# Patient Record
Sex: FEMALE | Race: ASIAN | NOT HISPANIC OR LATINO | Employment: OTHER | URBAN - METROPOLITAN AREA
[De-identification: names, ages, dates, MRNs, and addresses within clinical notes are randomized per-mention and may not be internally consistent; named-entity substitution may affect disease eponyms.]

---

## 2017-07-26 ENCOUNTER — RECORDS - HEALTHEAST (OUTPATIENT)
Dept: LAB | Facility: CLINIC | Age: 77
End: 2017-07-26

## 2017-07-26 LAB
CHOLEST SERPL-MCNC: 219 MG/DL
FASTING STATUS PATIENT QL REPORTED: ABNORMAL
HDLC SERPL-MCNC: 63 MG/DL
LDLC SERPL CALC-MCNC: 139 MG/DL
TRIGL SERPL-MCNC: 84 MG/DL

## 2018-08-24 ENCOUNTER — RECORDS - HEALTHEAST (OUTPATIENT)
Dept: LAB | Facility: CLINIC | Age: 78
End: 2018-08-24

## 2018-08-24 LAB
ALBUMIN SERPL-MCNC: 3.8 G/DL (ref 3.5–5)
ALP SERPL-CCNC: 105 U/L (ref 45–120)
ALT SERPL W P-5'-P-CCNC: 40 U/L (ref 0–45)
ANION GAP SERPL CALCULATED.3IONS-SCNC: 10 MMOL/L (ref 5–18)
AST SERPL W P-5'-P-CCNC: 30 U/L (ref 0–40)
BILIRUB SERPL-MCNC: 1 MG/DL (ref 0–1)
BUN SERPL-MCNC: 18 MG/DL (ref 8–28)
CALCIUM SERPL-MCNC: 8.8 MG/DL (ref 8.5–10.5)
CHLORIDE BLD-SCNC: 107 MMOL/L (ref 98–107)
CHOLEST SERPL-MCNC: 157 MG/DL
CO2 SERPL-SCNC: 26 MMOL/L (ref 22–31)
CREAT SERPL-MCNC: 0.79 MG/DL (ref 0.6–1.1)
ERYTHROCYTE [DISTWIDTH] IN BLOOD BY AUTOMATED COUNT: 12.9 % (ref 11–14.5)
FASTING STATUS PATIENT QL REPORTED: NORMAL
GFR SERPL CREATININE-BSD FRML MDRD: >60 ML/MIN/1.73M2
GLUCOSE BLD-MCNC: 100 MG/DL (ref 70–125)
HCT VFR BLD AUTO: 41.5 % (ref 35–47)
HDLC SERPL-MCNC: 50 MG/DL
HGB BLD-MCNC: 13.6 G/DL (ref 12–16)
LDLC SERPL CALC-MCNC: 91 MG/DL
MCH RBC QN AUTO: 31.3 PG (ref 27–34)
MCHC RBC AUTO-ENTMCNC: 32.8 G/DL (ref 32–36)
MCV RBC AUTO: 96 FL (ref 80–100)
PLATELET # BLD AUTO: 224 THOU/UL (ref 140–440)
PMV BLD AUTO: 9.9 FL (ref 8.5–12.5)
POTASSIUM BLD-SCNC: 4.3 MMOL/L (ref 3.5–5)
PROT SERPL-MCNC: 7.3 G/DL (ref 6–8)
RBC # BLD AUTO: 4.34 MILL/UL (ref 3.8–5.4)
SODIUM SERPL-SCNC: 143 MMOL/L (ref 136–145)
TRIGL SERPL-MCNC: 82 MG/DL
WBC: 6.1 THOU/UL (ref 4–11)

## 2018-08-27 LAB — 25(OH)D3 SERPL-MCNC: 28.1 NG/ML (ref 30–80)

## 2019-01-25 ENCOUNTER — RECORDS - HEALTHEAST (OUTPATIENT)
Dept: LAB | Facility: CLINIC | Age: 79
End: 2019-01-25

## 2019-01-28 LAB — 25(OH)D3 SERPL-MCNC: 19.5 NG/ML (ref 30–80)

## 2019-11-15 ENCOUNTER — RECORDS - HEALTHEAST (OUTPATIENT)
Dept: LAB | Facility: CLINIC | Age: 79
End: 2019-11-15

## 2019-11-15 LAB
ANION GAP SERPL CALCULATED.3IONS-SCNC: 7 MMOL/L (ref 5–18)
BUN SERPL-MCNC: 17 MG/DL (ref 8–28)
CALCIUM SERPL-MCNC: 9 MG/DL (ref 8.5–10.5)
CHLORIDE BLD-SCNC: 104 MMOL/L (ref 98–107)
CHOLEST SERPL-MCNC: 195 MG/DL
CO2 SERPL-SCNC: 30 MMOL/L (ref 22–31)
CREAT SERPL-MCNC: 0.92 MG/DL (ref 0.6–1.1)
FASTING STATUS PATIENT QL REPORTED: NORMAL
GFR SERPL CREATININE-BSD FRML MDRD: 59 ML/MIN/1.73M2
GLUCOSE BLD-MCNC: 112 MG/DL (ref 70–125)
HDLC SERPL-MCNC: 56 MG/DL
LDLC SERPL CALC-MCNC: 124 MG/DL
POTASSIUM BLD-SCNC: 4.1 MMOL/L (ref 3.5–5)
SODIUM SERPL-SCNC: 141 MMOL/L (ref 136–145)
TRIGL SERPL-MCNC: 77 MG/DL

## 2021-02-22 ENCOUNTER — RECORDS - HEALTHEAST (OUTPATIENT)
Dept: LAB | Facility: CLINIC | Age: 81
End: 2021-02-22

## 2021-02-23 LAB
ALBUMIN SERPL-MCNC: 3.4 G/DL (ref 3.5–5)
ALP SERPL-CCNC: 98 U/L (ref 45–120)
ALT SERPL W P-5'-P-CCNC: 29 U/L (ref 0–45)
ANION GAP SERPL CALCULATED.3IONS-SCNC: 7 MMOL/L (ref 5–18)
AST SERPL W P-5'-P-CCNC: 23 U/L (ref 0–40)
BILIRUB SERPL-MCNC: 1.1 MG/DL (ref 0–1)
BUN SERPL-MCNC: 25 MG/DL (ref 8–28)
CALCIUM SERPL-MCNC: 8.2 MG/DL (ref 8.5–10.5)
CHLORIDE BLD-SCNC: 106 MMOL/L (ref 98–107)
CHOLEST SERPL-MCNC: 159 MG/DL
CO2 SERPL-SCNC: 31 MMOL/L (ref 22–31)
CREAT SERPL-MCNC: 0.82 MG/DL (ref 0.6–1.1)
FASTING STATUS PATIENT QL REPORTED: NORMAL
GFR SERPL CREATININE-BSD FRML MDRD: >60 ML/MIN/1.73M2
GLUCOSE BLD-MCNC: 103 MG/DL (ref 70–125)
HDLC SERPL-MCNC: 62 MG/DL
LDLC SERPL CALC-MCNC: 86 MG/DL
POTASSIUM BLD-SCNC: 3.5 MMOL/L (ref 3.5–5)
PROT SERPL-MCNC: 6.5 G/DL (ref 6–8)
SODIUM SERPL-SCNC: 144 MMOL/L (ref 136–145)
TRIGL SERPL-MCNC: 53 MG/DL

## 2021-05-26 ENCOUNTER — RECORDS - HEALTHEAST (OUTPATIENT)
Dept: ADMINISTRATIVE | Facility: CLINIC | Age: 81
End: 2021-05-26

## 2021-05-28 ENCOUNTER — RECORDS - HEALTHEAST (OUTPATIENT)
Dept: ADMINISTRATIVE | Facility: CLINIC | Age: 81
End: 2021-05-28

## 2021-05-29 ENCOUNTER — RECORDS - HEALTHEAST (OUTPATIENT)
Dept: ADMINISTRATIVE | Facility: CLINIC | Age: 81
End: 2021-05-29

## 2021-06-02 ENCOUNTER — RECORDS - HEALTHEAST (OUTPATIENT)
Dept: ADMINISTRATIVE | Facility: CLINIC | Age: 81
End: 2021-06-02

## 2021-06-03 ENCOUNTER — RECORDS - HEALTHEAST (OUTPATIENT)
Dept: ADMINISTRATIVE | Facility: CLINIC | Age: 81
End: 2021-06-03

## 2021-06-24 ENCOUNTER — APPOINTMENT (OUTPATIENT)
Dept: CT IMAGING | Facility: CLINIC | Age: 81
End: 2021-06-24
Attending: EMERGENCY MEDICINE
Payer: COMMERCIAL

## 2021-06-24 ENCOUNTER — APPOINTMENT (OUTPATIENT)
Dept: GENERAL RADIOLOGY | Facility: CLINIC | Age: 81
End: 2021-06-24
Attending: EMERGENCY MEDICINE
Payer: COMMERCIAL

## 2021-06-24 ENCOUNTER — HOSPITAL ENCOUNTER (EMERGENCY)
Facility: CLINIC | Age: 81
Discharge: HOME | End: 2021-06-24
Attending: EMERGENCY MEDICINE | Admitting: EMERGENCY MEDICINE
Payer: COMMERCIAL

## 2021-06-24 VITALS
BODY MASS INDEX: 26.5 KG/M2 | WEIGHT: 135 LBS | DIASTOLIC BLOOD PRESSURE: 152 MMHG | OXYGEN SATURATION: 77 % | HEART RATE: 73 BPM | HEIGHT: 60 IN | TEMPERATURE: 97.1 F | SYSTOLIC BLOOD PRESSURE: 171 MMHG

## 2021-06-24 DIAGNOSIS — G45.9 TIA (TRANSIENT ISCHEMIC ATTACK): ICD-10-CM

## 2021-06-24 LAB
ALBUMIN UR-MCNC: 30 MG/DL
ANION GAP SERPL CALCULATED.3IONS-SCNC: 1 MMOL/L (ref 3–14)
APPEARANCE UR: CLEAR
APTT PPP: 29 SEC (ref 22–37)
BASOPHILS # BLD AUTO: 0 10E9/L (ref 0–0.2)
BASOPHILS NFR BLD AUTO: 0.4 %
BILIRUB UR QL STRIP: NEGATIVE
BUN SERPL-MCNC: 34 MG/DL (ref 7–30)
CALCIUM SERPL-MCNC: 8.8 MG/DL (ref 8.5–10.1)
CHLORIDE SERPL-SCNC: 107 MMOL/L (ref 94–109)
CO2 SERPL-SCNC: 31 MMOL/L (ref 20–32)
COLOR UR AUTO: ABNORMAL
CREAT SERPL-MCNC: 0.95 MG/DL (ref 0.52–1.04)
DIFFERENTIAL METHOD BLD: NORMAL
EOSINOPHIL # BLD AUTO: 0.2 10E9/L (ref 0–0.7)
EOSINOPHIL NFR BLD AUTO: 2 %
ERYTHROCYTE [DISTWIDTH] IN BLOOD BY AUTOMATED COUNT: 13.2 % (ref 10–15)
GFR SERPL CREATININE-BSD FRML MDRD: 56 ML/MIN/{1.73_M2}
GLUCOSE SERPL-MCNC: 149 MG/DL (ref 70–99)
GLUCOSE UR STRIP-MCNC: NEGATIVE MG/DL
HCT VFR BLD AUTO: 42.4 % (ref 35–47)
HGB BLD-MCNC: 13.4 G/DL (ref 11.7–15.7)
HGB UR QL STRIP: NEGATIVE
IMM GRANULOCYTES # BLD: 0.1 10E9/L (ref 0–0.4)
IMM GRANULOCYTES NFR BLD: 0.9 %
INR PPP: 0.9 (ref 0.86–1.14)
KETONES UR STRIP-MCNC: NEGATIVE MG/DL
LEUKOCYTE ESTERASE UR QL STRIP: NEGATIVE
LYMPHOCYTES # BLD AUTO: 1 10E9/L (ref 0.8–5.3)
LYMPHOCYTES NFR BLD AUTO: 13.5 %
MCH RBC QN AUTO: 30.4 PG (ref 26.5–33)
MCHC RBC AUTO-ENTMCNC: 31.6 G/DL (ref 31.5–36.5)
MCV RBC AUTO: 96 FL (ref 78–100)
MONOCYTES # BLD AUTO: 0.4 10E9/L (ref 0–1.3)
MONOCYTES NFR BLD AUTO: 5.6 %
NEUTROPHILS # BLD AUTO: 5.8 10E9/L (ref 1.6–8.3)
NEUTROPHILS NFR BLD AUTO: 77.6 %
NITRATE UR QL: NEGATIVE
NRBC # BLD AUTO: 0 10*3/UL
NRBC BLD AUTO-RTO: 0 /100
PH UR STRIP: 7.5 PH (ref 5–7)
PLATELET # BLD AUTO: 198 10E9/L (ref 150–450)
POTASSIUM SERPL-SCNC: 5 MMOL/L (ref 3.4–5.3)
RBC # BLD AUTO: 4.41 10E12/L (ref 3.8–5.2)
RBC #/AREA URNS AUTO: 0 /HPF (ref 0–2)
SODIUM SERPL-SCNC: 139 MMOL/L (ref 133–144)
SOURCE: ABNORMAL
SP GR UR STRIP: 1.03 (ref 1–1.03)
SQUAMOUS #/AREA URNS AUTO: <1 /HPF (ref 0–1)
TROPONIN I SERPL-MCNC: 0.01 UG/L (ref 0–0.04)
UROBILINOGEN UR STRIP-MCNC: 0 MG/DL (ref 0–2)
WBC # BLD AUTO: 7.5 10E9/L (ref 4–11)
WBC #/AREA URNS AUTO: 2 /HPF (ref 0–5)

## 2021-06-24 PROCEDURE — 93005 ELECTROCARDIOGRAM TRACING: CPT

## 2021-06-24 PROCEDURE — 81001 URINALYSIS AUTO W/SCOPE: CPT | Performed by: EMERGENCY MEDICINE

## 2021-06-24 PROCEDURE — 250N000011 HC RX IP 250 OP 636: Performed by: EMERGENCY MEDICINE

## 2021-06-24 PROCEDURE — 70450 CT HEAD/BRAIN W/O DYE: CPT

## 2021-06-24 PROCEDURE — 70496 CT ANGIOGRAPHY HEAD: CPT

## 2021-06-24 PROCEDURE — 0042T CT HEAD PERFUSION WITH CONTRAST: CPT

## 2021-06-24 PROCEDURE — 84484 ASSAY OF TROPONIN QUANT: CPT | Performed by: EMERGENCY MEDICINE

## 2021-06-24 PROCEDURE — 85730 THROMBOPLASTIN TIME PARTIAL: CPT | Performed by: EMERGENCY MEDICINE

## 2021-06-24 PROCEDURE — 80048 BASIC METABOLIC PNL TOTAL CA: CPT | Performed by: EMERGENCY MEDICINE

## 2021-06-24 PROCEDURE — 99285 EMERGENCY DEPT VISIT HI MDM: CPT | Mod: 25

## 2021-06-24 PROCEDURE — 85025 COMPLETE CBC W/AUTO DIFF WBC: CPT | Performed by: EMERGENCY MEDICINE

## 2021-06-24 PROCEDURE — 71045 X-RAY EXAM CHEST 1 VIEW: CPT

## 2021-06-24 PROCEDURE — 250N000009 HC RX 250: Performed by: EMERGENCY MEDICINE

## 2021-06-24 PROCEDURE — 85610 PROTHROMBIN TIME: CPT | Performed by: EMERGENCY MEDICINE

## 2021-06-24 RX ORDER — LISINOPRIL 10 MG/1
10 TABLET ORAL DAILY
Qty: 20 TABLET | Refills: 0 | Status: SHIPPED | OUTPATIENT
Start: 2021-06-24 | End: 2022-06-03

## 2021-06-24 RX ORDER — IOPAMIDOL 755 MG/ML
120 INJECTION, SOLUTION INTRAVASCULAR ONCE
Status: COMPLETED | OUTPATIENT
Start: 2021-06-24 | End: 2021-06-24

## 2021-06-24 RX ADMIN — SODIUM CHLORIDE 100 ML: 9 INJECTION, SOLUTION INTRAVENOUS at 19:29

## 2021-06-24 RX ADMIN — IOPAMIDOL 120 ML: 755 INJECTION, SOLUTION INTRAVENOUS at 19:29

## 2021-06-24 ASSESSMENT — ENCOUNTER SYMPTOMS
CONFUSION: 1
HEADACHES: 1

## 2021-06-24 ASSESSMENT — MIFFLIN-ST. JEOR: SCORE: 998.86

## 2021-06-25 ENCOUNTER — RECORDS - HEALTHEAST (OUTPATIENT)
Dept: LAB | Facility: CLINIC | Age: 81
End: 2021-06-25

## 2021-06-25 LAB
ANION GAP SERPL CALCULATED.3IONS-SCNC: 16 MMOL/L (ref 5–18)
BUN SERPL-MCNC: 8 MG/DL (ref 8–28)
CALCIUM SERPL-MCNC: 9.4 MG/DL (ref 8.5–10.5)
CHLORIDE BLD-SCNC: 102 MMOL/L (ref 98–107)
CHOLEST SERPL-MCNC: 172 MG/DL
CO2 SERPL-SCNC: 21 MMOL/L (ref 22–31)
CREAT SERPL-MCNC: 0.65 MG/DL (ref 0.6–1.1)
FASTING STATUS PATIENT QL REPORTED: ABNORMAL
GFR SERPL CREATININE-BSD FRML MDRD: >60 ML/MIN/1.73M2
GLUCOSE BLD-MCNC: 255 MG/DL (ref 70–125)
HDLC SERPL-MCNC: 44 MG/DL
INTERPRETATION ECG - MUSE: NORMAL
LDLC SERPL CALC-MCNC: 76 MG/DL
POTASSIUM BLD-SCNC: 4 MMOL/L (ref 3.5–5)
SODIUM SERPL-SCNC: 139 MMOL/L (ref 136–145)
TRIGL SERPL-MCNC: 262 MG/DL

## 2021-06-25 NOTE — DISCHARGE INSTRUCTIONS
Your symptoms have spontaneously resolved and your stroke evaluation has been normal.  We have discussed that we have not ruled out a tiny stroke with CT scan but you feel your mom is back to normal and he requested to go home.  Please start aspirin daily.  Please start lisinopril due to high blood pressures in the emergency room.  Please follow-up with your regular doctor for blood pressure recheck.  Please return to the hospital if this recurs or if you change your mind about admission.

## 2021-06-25 NOTE — ED NOTES
Bed: ED20  Expected date:   Expected time:   Means of arrival:   Comments:  Ems - [facility]

## 2021-06-25 NOTE — CONSULTS
[facility]    Stroke Telephone Note    I was called by Abdullahi Triplett on 06/24/21 regarding patient [place]. The patient is a 81 year old Hmong female with documented PMH of HTN, HLD seen as a stroke alert for confusion starting 5:40 PM on 6/24/2021.     The patient is reported to have been having dinner with her family around 5:40 AM when she was suddenly noted to be confused, not talking as much and having some right-sided weakness.  On arrival to the ED,  mmHg.  Head CT as well as CT of the head and neck did not show any acute parenchymal pathology or proximal vessel occlusions.  Moderate to severe microangiopathic disease was noted on head CT along with calcification of the left vertebral artery.  CT perfusion did not show any areas in a vascular distribution of prolonged T-max.    Stroke Code Data (for stroke code without tele)  Stroke code activated 06/24/21   1911   Stroke provider first response  06/24/21   1913    06/24/21       Last known normal 06/24/21   1740        Time of discovery   (or onset of symptoms) 06/24/21   1740   Head CT read by Stroke Neuro Dr/Provider 06/24/21   1928   Was stroke code de-escalated?   06/24/21 2004  presence of contraindications for both intravenous and intra-arterial stroke treatments       Thrombolytic Treatment   Not given due to NIHSS 0 .    Endovascular Treatment  Not initiated due to absence of proximal vessel occlusion    Impression  Transient confusion without obvious focal neurological deficits      The patient was examined on telestroke and noted to have a NIHSS: 0.  She was using her left upper extremity more than the right, but did not have any measurable weakness in her right upper extremity.  No pronator vertical drift was observed in all 4 limbs.  She was able to name and repeat when spoken in her native language.  She was able to name a pen and a phone, when spoken to her in her native language.  The patient is  "reported to have complained of a headache at onset and her symptoms might be related to a complex migraine.  However, more migraine history could not be elicited given the language barrier in the acute setting.  Finally, she was reported to have been severely pallid despite a normal blood pressure on arrival, and her symptoms may be due to a presyncopal episode    Recommendations   - Neurochecks every 4 hours   - Permissive HTN; goal SBP < 220 mmHg  - Daily aspirin 325 mg for secondary stroke prevention  - Statin: atorvastatin 40mg HS  - MRI Brain without contrast  - TTE (without bubble Study)  - Telemetry, EKG  - Bedside Glucose Monitoring  - A1c, Lipid Panel, Troponin x 3  - PT/OT/SLP  - Stroke Education  - Depression Screen  - Apnea screening questions  - Euthermia, Euglycemia      My recommendations are based on the information provided over the phone by Errol Londono's in-person providers. They are not intended to replace the clinical judgment of her in-person providers. I was not requested to personally see or examine the patient at this time.    The Stroke Staff is Dr. Gilliam.    SCAR IRENE MD  Vascular Neurology Fellow  To page me or covering stroke neurology team member, click here: [facility]COM   Choose \"On Call\" tab at top, then search dropdown box for \"Neurology Adult\", select location, press Enter, then look for stroke/neuro ICU/telestroke.         "

## 2021-06-25 NOTE — ED PROVIDER NOTES
History     Chief Complaint:  Confusion     The history is provided by a relative. The history is limited by a language barrier.  used: relative.      Errol Londono is a 81 year old female with history of hypertension, and venous insufficiency who presents for evaluation of confusion. She was seated with family when the patient stated she had a headache and then had a sudden onset of confusion. This occurred around [id number]. The patient did not get better so the family called for EMS who transported the patient to the ER.  On arrival patient is not responding to family's questions.  Eyes are wandering.  Seem to occasionally move extremities to command speech is quiet and not understandable according to family the patient does not speak English.  There is complaint of moderate headache.  No chest pain or shortness of breath.    Review of Systems   Neurological: Positive for headaches.   Psychiatric/Behavioral: Positive for confusion.   All other systems reviewed and are negative.      Allergies:  The patient has no known allergies.     Medications:  Fosamax  Pravachol  Amlodipine  Klorcon  Lasix  Ropinirole    Past Medical History:    Osteoporosis  Prediabetes  Hypertension   Restless leg syndrome  Hypercholesterolemia  Plantar Fascial Fibromatosis  Venous Insufficiency  Arthritis   GERD  Depression      Past Surgical History:    Biliary Tube Change  Transcatheter Biopsy    Social History:  The patient was accompanied to the ED by EMS and family.  Speaks Hmong.     Physical Exam     Patient Vitals for the past 24 hrs:   BP Temp Pulse SpO2 Height Weight   06/24/21 2045 (!) 171/152 -- 73 -- -- --   06/24/21 2030 (!) 199/111 -- 78 -- -- --   06/24/21 2015 (!) 190/121 -- 78 (!) 77 % -- --   06/24/21 2000 (!) 183/109 -- 80 96 % -- --   06/24/21 1945 (!) 153/101 -- -- -- -- --   06/24/21 1915 -- -- -- 98 % -- --   06/24/21 1911 -- -- -- -- 1.524 m (5') 61.2 kg (135 lb)   06/24/21 1910 (!) 166/101 97.1  F  (36.2  C) 68 97 % -- --       Physical Exam  Constitutional:       Appearance: She is diaphoretic.   HENT:      Head: Normocephalic.      Mouth/Throat:      Mouth: Mucous membranes are moist.   Eyes:      General: No scleral icterus.  Neck:      Musculoskeletal: Normal range of motion.   Cardiovascular:      Rate and Rhythm: Normal rate and regular rhythm.   Pulmonary:      Effort: Pulmonary effort is normal.      Breath sounds: Normal breath sounds.   Musculoskeletal: Normal range of motion.   Skin:     General: Skin is warm.   Neurological:      Mental Status: She is lethargic and confused.      GCS: GCS eye subscore is 4. GCS verbal subscore is 4. GCS motor subscore is 5.   Psychiatric:      Comments: Difficult to assess due to language barrier         Emergency Department Course     ECG:  ECG taken at 2006, ECG read at 2007  Sinus Rhythm with 1st Degree AV Block  Otherwise Normal ECG  Rate 75 bpm. TX interval 214 ms. QRS duration 82 ms. QT/QTc 392/437 ms. P-R-T axes 51 -1 19.     Imaging:    CT Head Perfusion w Contrast  IMPRESSION: No convincing acute perfusion defect. Few scattered areas   of nonspecific transit time prolongation most conspicuous   corresponding to the bilateral corona radiata and right inferior   cerebellum. MRI could be performed if indicated.   Report per radiology     CTA Head Neck with Contrast  IMPRESSION:   CTA Head:   1. No evidence of large vessel occlusion or high-grade stenosis.   2. Scattered atherosclerotic plaquing with multiple mild-to-moderate   stenoses.   3. Poor visualization of the right posterior inferior cerebellar   artery.   CTA Neck:   1. No evidence of large vessel occlusion or high-grade stenosis. Mild   plaquing at the bilateral carotid bifurcations and right vertebral   artery origin.   Report per radiology     CT Head w/o Contrast  IMPRESSION:   1. Small areas of hypoattenuation involving the bilateral basal   ganglia and thalami suspicious for lacunar infarcts,  age   indeterminate, presumably chronic. MRI could be performed if   indicated.   2. Otherwise, no CT evidence of acute ischemia.   3. No evidence of hemorrhage.   4. Patchy confluent white matter hypoattenuation which represents   advanced chronic small vessel ischemic change.   Report per radiology     XR Chest Port  IMPRESSION: No significant pleural fluid. No pneumothorax.   Heterogeneous pulmonary opacities are concerning for multifocal   airspace disease. Edema could also have this appearance. The   cardiomediastinal silhouette is enlarged. There is aortic   calcification.   Report per radiology     Laboratory:    CBC: WBC 7.5, HGB 13.4,   BMP: Anion Gap: 1 (L), Glucose: 149 (H), BUN: 34 (H), GFR: 56 (L), o/w WNL (Creatinine 0.95)    UA with microscopic: pH: 7.5 (H), Albumin: 30 o/w WNL     INR: 0.90    Partial Thromboplastin Time: 29    Troponin (Collected 1910): <0.015    Emergency Department Course:    Reviewed:    1905 I reviewed the patient's nursing notes, vitals, past history and care everywhere    Assessments:    1905 I performed an exam of the patient as documented above.     1908 Tier 1 Stroke Code Called.     1955 Stroke Code Deescalated    1957 I rechecked the patient and explained findings.    2055 I rechecked the patient and family felt the patient symptoms have resolved. Family wants to bring patient home.     Consults:     1957  I consulted with Neuro/Stroke fellow, about the patient.     Disposition:  The patient was discharged to home.     Impression & Plan          Medical Decision Making:  Errol Londono is a 81 year old female who presents to the emergency department today for evaluation of altered mental status.  Patient apparently slumped down in a chair and was unresponsive at home.  Differential diagnosis includes seizure syncope and/or stroke on arrival here patient seems to be not responding to family does not really follow commands due to acute confusion sudden onset and normal  blood sugar and high blood pressure rather than low and history of headache stroke code was called urgent CT for evaluation of hemorrhage was identified performed and negative.  While in the emergency room patient suddenly came back to normal.  She was able to talk to the family without difficulty and family was requesting to go home.  Due to her elevated pressures felt uncomfortable this and considered admission however family adamantly denies this and prefers to go home.  We will initiate lisinopril and daily aspirin due to inability to rule out TIA.  Recommend follow-up with primary care for blood pressure check and return for worsening condition or when they decide they are amenable to admission.  Patient was discharged in guarded condition due to age at 81 high blood pressure poorly controlled an episode of near syncope without clear cause and concern for TIA versus ischemic stroke that has now improved or resolved according to the family.      Diagnosis:    ICD-10-CM    1. TIA (transient ischemic attack)  G45.9 Troponin I       Discharge Medications:  Discharge Medication List as of 6/24/2021  9:28 PM      START taking these medications    Details   aspirin (ASA) 325 MG EC tablet Take 1 tablet (325 mg) by mouth every 6 hours as needed for moderate pain, Disp-30 tablet, R-0, Local Print      lisinopril (ZESTRIL) 10 MG tablet Take 1 tablet (10 mg) by mouth daily, Disp-20 tablet, R-0, Local Print             Scribe Disclosure:  I, Soila Menard, am serving as a scribe at 7:07 PM on 6/24/2021 to document services personally performed by Abdullahi Triplett MD based on my observations and the provider's statements to me.     [facility] EMERGENCY DEPT         Abdullahi Triplett MD  06/25/21 0011

## 2022-01-01 ENCOUNTER — MEDICAL CORRESPONDENCE (OUTPATIENT)
Dept: HEALTH INFORMATION MANAGEMENT | Facility: CLINIC | Age: 82
End: 2022-01-01

## 2022-01-01 ENCOUNTER — LAB REQUISITION (OUTPATIENT)
Dept: LAB | Facility: CLINIC | Age: 82
End: 2022-01-01
Payer: MEDICARE

## 2022-01-01 ENCOUNTER — LAB REQUISITION (OUTPATIENT)
Dept: LAB | Facility: CLINIC | Age: 82
End: 2022-01-01
Payer: COMMERCIAL

## 2022-01-01 ENCOUNTER — NURSING HOME VISIT (OUTPATIENT)
Dept: GERIATRICS | Facility: CLINIC | Age: 82
End: 2022-01-01
Payer: COMMERCIAL

## 2022-01-01 ENCOUNTER — DOCUMENTATION ONLY (OUTPATIENT)
Dept: OTHER | Facility: CLINIC | Age: 82
End: 2022-01-01

## 2022-01-01 ENCOUNTER — NURSING HOME VISIT (OUTPATIENT)
Dept: GERIATRICS | Facility: CLINIC | Age: 82
End: 2022-01-01
Payer: MEDICARE

## 2022-01-01 ENCOUNTER — PATIENT OUTREACH (OUTPATIENT)
Dept: CARE COORDINATION | Facility: CLINIC | Age: 82
End: 2022-01-01

## 2022-01-01 ENCOUNTER — LAB REQUISITION (OUTPATIENT)
Dept: LAB | Facility: CLINIC | Age: 82
End: 2022-01-01
Payer: OTHER MISCELLANEOUS

## 2022-01-01 ENCOUNTER — NURSING HOME VISIT (OUTPATIENT)
Dept: GERIATRICS | Facility: CLINIC | Age: 82
End: 2022-01-01
Payer: OTHER MISCELLANEOUS

## 2022-01-01 ENCOUNTER — TELEPHONE (OUTPATIENT)
Dept: GERIATRICS | Facility: CLINIC | Age: 82
End: 2022-01-01

## 2022-01-01 VITALS
BODY MASS INDEX: 19.43 KG/M2 | OXYGEN SATURATION: 90 % | SYSTOLIC BLOOD PRESSURE: 125 MMHG | HEART RATE: 83 BPM | WEIGHT: 99.5 LBS | TEMPERATURE: 97.8 F | DIASTOLIC BLOOD PRESSURE: 73 MMHG | RESPIRATION RATE: 18 BRPM

## 2022-01-01 VITALS
RESPIRATION RATE: 19 BRPM | DIASTOLIC BLOOD PRESSURE: 79 MMHG | OXYGEN SATURATION: 96 % | HEART RATE: 77 BPM | SYSTOLIC BLOOD PRESSURE: 122 MMHG | HEIGHT: 60 IN | BODY MASS INDEX: 18.1 KG/M2 | TEMPERATURE: 97.7 F | WEIGHT: 92.2 LBS

## 2022-01-01 VITALS
SYSTOLIC BLOOD PRESSURE: 154 MMHG | TEMPERATURE: 98.6 F | BODY MASS INDEX: 18.06 KG/M2 | WEIGHT: 92 LBS | HEIGHT: 60 IN | RESPIRATION RATE: 18 BRPM | OXYGEN SATURATION: 93 % | HEART RATE: 77 BPM | DIASTOLIC BLOOD PRESSURE: 72 MMHG

## 2022-01-01 VITALS
OXYGEN SATURATION: 92 % | RESPIRATION RATE: 18 BRPM | DIASTOLIC BLOOD PRESSURE: 83 MMHG | TEMPERATURE: 96.9 F | HEART RATE: 84 BPM | HEIGHT: 60 IN | BODY MASS INDEX: 17.98 KG/M2 | SYSTOLIC BLOOD PRESSURE: 126 MMHG | WEIGHT: 91.6 LBS

## 2022-01-01 VITALS
SYSTOLIC BLOOD PRESSURE: 131 MMHG | DIASTOLIC BLOOD PRESSURE: 78 MMHG | BODY MASS INDEX: 18.73 KG/M2 | HEIGHT: 60 IN | TEMPERATURE: 97.4 F | WEIGHT: 95.4 LBS | HEART RATE: 82 BPM | RESPIRATION RATE: 18 BRPM | OXYGEN SATURATION: 93 %

## 2022-01-01 VITALS
BODY MASS INDEX: 18.73 KG/M2 | RESPIRATION RATE: 16 BRPM | SYSTOLIC BLOOD PRESSURE: 128 MMHG | TEMPERATURE: 98.1 F | HEART RATE: 84 BPM | WEIGHT: 95.4 LBS | DIASTOLIC BLOOD PRESSURE: 80 MMHG | HEIGHT: 60 IN | OXYGEN SATURATION: 93 %

## 2022-01-01 VITALS
HEIGHT: 60 IN | BODY MASS INDEX: 18.89 KG/M2 | TEMPERATURE: 97.9 F | OXYGEN SATURATION: 93 % | WEIGHT: 96.2 LBS | DIASTOLIC BLOOD PRESSURE: 64 MMHG | RESPIRATION RATE: 16 BRPM | HEART RATE: 88 BPM | SYSTOLIC BLOOD PRESSURE: 121 MMHG

## 2022-01-01 DIAGNOSIS — Z11.1 ENCOUNTER FOR SCREENING FOR RESPIRATORY TUBERCULOSIS: ICD-10-CM

## 2022-01-01 DIAGNOSIS — U07.1 COVID-19: ICD-10-CM

## 2022-01-01 DIAGNOSIS — I27.20 MODERATE PULMONARY HYPERTENSION (H): ICD-10-CM

## 2022-01-01 DIAGNOSIS — I27.20 MODERATE PULMONARY HYPERTENSION (H): Primary | ICD-10-CM

## 2022-01-01 DIAGNOSIS — I50.9 CHRONIC CONGESTIVE HEART FAILURE, UNSPECIFIED HEART FAILURE TYPE (H): ICD-10-CM

## 2022-01-01 DIAGNOSIS — G30.9 MIXED DEMENTIA (H): ICD-10-CM

## 2022-01-01 DIAGNOSIS — U07.1 INFECTION DUE TO 2019 NOVEL CORONAVIRUS: ICD-10-CM

## 2022-01-01 DIAGNOSIS — F33.9 RECURRENT MAJOR DEPRESSIVE DISORDER, REMISSION STATUS UNSPECIFIED (H): ICD-10-CM

## 2022-01-01 DIAGNOSIS — F03.90 DEMENTIA WITHOUT BEHAVIORAL DISTURBANCE, UNSPECIFIED DEMENTIA TYPE: ICD-10-CM

## 2022-01-01 DIAGNOSIS — I50.9 NEW ONSET OF CONGESTIVE HEART FAILURE (H): Primary | ICD-10-CM

## 2022-01-01 DIAGNOSIS — Z51.5 HOSPICE CARE PATIENT: ICD-10-CM

## 2022-01-01 DIAGNOSIS — Z51.5 HOSPICE CARE PATIENT: Primary | ICD-10-CM

## 2022-01-01 DIAGNOSIS — J96.01 ACUTE RESPIRATORY FAILURE WITH HYPOXIA (H): ICD-10-CM

## 2022-01-01 DIAGNOSIS — F01.50 MIXED DEMENTIA (H): ICD-10-CM

## 2022-01-01 DIAGNOSIS — R62.7 FAILURE TO THRIVE IN ADULT: Primary | ICD-10-CM

## 2022-01-01 DIAGNOSIS — M62.81 GENERALIZED MUSCLE WEAKNESS: ICD-10-CM

## 2022-01-01 DIAGNOSIS — D64.9 ANEMIA, UNSPECIFIED TYPE: ICD-10-CM

## 2022-01-01 DIAGNOSIS — F02.80 MIXED DEMENTIA (H): ICD-10-CM

## 2022-01-01 DIAGNOSIS — J18.9 COMMUNITY ACQUIRED PNEUMONIA, UNSPECIFIED LATERALITY: ICD-10-CM

## 2022-01-01 DIAGNOSIS — R62.7 FAILURE TO THRIVE IN ADULT: ICD-10-CM

## 2022-01-01 DIAGNOSIS — G93.41 METABOLIC ENCEPHALOPATHY: ICD-10-CM

## 2022-01-01 DIAGNOSIS — N17.9 ACUTE KIDNEY INJURY (H): ICD-10-CM

## 2022-01-01 DIAGNOSIS — Z71.89 OTHER SPECIFIED COUNSELING: ICD-10-CM

## 2022-01-01 DIAGNOSIS — B85.0 LICE INFESTED HAIR: ICD-10-CM

## 2022-01-01 DIAGNOSIS — I67.9 CEREBRAL VASCULAR DISEASE: ICD-10-CM

## 2022-01-01 DIAGNOSIS — I50.9 NEW ONSET OF CONGESTIVE HEART FAILURE (H): ICD-10-CM

## 2022-01-01 DIAGNOSIS — I67.9 CEREBRAL VASCULAR DISEASE: Primary | ICD-10-CM

## 2022-01-01 DIAGNOSIS — R19.7 DIARRHEA, UNSPECIFIED TYPE: ICD-10-CM

## 2022-01-01 LAB
BACTERIA BLD CULT: NO GROWTH
BACTERIA BLD CULT: NO GROWTH
GAMMA INTERFERON BACKGROUND BLD IA-ACNC: 0.07 IU/ML
M TB IFN-G BLD-IMP: NEGATIVE
M TB IFN-G CD4+ BCKGRND COR BLD-ACNC: 2.89 IU/ML
MITOGEN IGNF BCKGRD COR BLD-ACNC: 0.02 IU/ML
MITOGEN IGNF BCKGRD COR BLD-ACNC: 0.03 IU/ML
QUANTIFERON MITOGEN: 2.96 IU/ML
QUANTIFERON NIL TUBE: 0.07 IU/ML
QUANTIFERON TB1 TUBE: 0.1 IU/ML
QUANTIFERON TB2 TUBE: 0.09
SARS-COV-2 RNA RESP QL NAA+PROBE: NEGATIVE
SARS-COV-2 RNA RESP QL NAA+PROBE: POSITIVE

## 2022-01-01 PROCEDURE — 99305 1ST NF CARE MODERATE MDM 35: CPT | Mod: GW | Performed by: INTERNAL MEDICINE

## 2022-01-01 PROCEDURE — 250N000009 HC RX 250: Performed by: STUDENT IN AN ORGANIZED HEALTH CARE EDUCATION/TRAINING PROGRAM

## 2022-01-01 PROCEDURE — 99310 SBSQ NF CARE HIGH MDM 45: CPT | Mod: GV

## 2022-01-01 PROCEDURE — 86481 TB AG RESPONSE T-CELL SUSP: CPT | Mod: ORL

## 2022-01-01 PROCEDURE — 94640 AIRWAY INHALATION TREATMENT: CPT

## 2022-01-01 PROCEDURE — U0003 INFECTIOUS AGENT DETECTION BY NUCLEIC ACID (DNA OR RNA); SEVERE ACUTE RESPIRATORY SYNDROME CORONAVIRUS 2 (SARS-COV-2) (CORONAVIRUS DISEASE [COVID-19]), AMPLIFIED PROBE TECHNIQUE, MAKING USE OF HIGH THROUGHPUT TECHNOLOGIES AS DESCRIBED BY CMS-2020-01-R: HCPCS | Mod: ORL

## 2022-01-01 PROCEDURE — U0005 INFEC AGEN DETEC AMPLI PROBE: HCPCS | Mod: ORL

## 2022-01-01 PROCEDURE — P9604 ONE-WAY ALLOW PRORATED TRIP: HCPCS | Mod: ORL

## 2022-01-01 PROCEDURE — U0003 INFECTIOUS AGENT DETECTION BY NUCLEIC ACID (DNA OR RNA); SEVERE ACUTE RESPIRATORY SYNDROME CORONAVIRUS 2 (SARS-COV-2) (CORONAVIRUS DISEASE [COVID-19]), AMPLIFIED PROBE TECHNIQUE, MAKING USE OF HIGH THROUGHPUT TECHNOLOGIES AS DESCRIBED BY CMS-2020-01-R: HCPCS | Performed by: NURSE PRACTITIONER

## 2022-01-01 PROCEDURE — 999N000157 HC STATISTIC RCP TIME EA 10 MIN

## 2022-01-01 PROCEDURE — 120N000001 HC R&B MED SURG/OB

## 2022-01-01 PROCEDURE — 36415 COLL VENOUS BLD VENIPUNCTURE: CPT | Mod: ORL

## 2022-01-01 PROCEDURE — 94640 AIRWAY INHALATION TREATMENT: CPT | Mod: 76

## 2022-01-01 PROCEDURE — 99232 SBSQ HOSP IP/OBS MODERATE 35: CPT | Performed by: INTERNAL MEDICINE

## 2022-01-01 PROCEDURE — 250N000013 HC RX MED GY IP 250 OP 250 PS 637: Performed by: NURSE PRACTITIONER

## 2022-01-01 PROCEDURE — 99239 HOSP IP/OBS DSCHRG MGMT >30: CPT | Mod: GV | Performed by: INTERNAL MEDICINE

## 2022-01-01 PROCEDURE — 99309 SBSQ NF CARE MODERATE MDM 30: CPT | Mod: GV | Performed by: INTERNAL MEDICINE

## 2022-01-01 PROCEDURE — 99308 SBSQ NF CARE LOW MDM 20: CPT | Mod: CS | Performed by: PHYSICIAN ASSISTANT

## 2022-01-01 PROCEDURE — U0003 INFECTIOUS AGENT DETECTION BY NUCLEIC ACID (DNA OR RNA); SEVERE ACUTE RESPIRATORY SYNDROME CORONAVIRUS 2 (SARS-COV-2) (CORONAVIRUS DISEASE [COVID-19]), AMPLIFIED PROBE TECHNIQUE, MAKING USE OF HIGH THROUGHPUT TECHNOLOGIES AS DESCRIBED BY CMS-2020-01-R: HCPCS

## 2022-01-01 PROCEDURE — 99310 SBSQ NF CARE HIGH MDM 45: CPT | Mod: GW

## 2022-01-01 PROCEDURE — U0005 INFEC AGEN DETEC AMPLI PROBE: HCPCS | Mod: ORL | Performed by: INTERNAL MEDICINE

## 2022-01-01 PROCEDURE — 250N000011 HC RX IP 250 OP 636: Performed by: STUDENT IN AN ORGANIZED HEALTH CARE EDUCATION/TRAINING PROGRAM

## 2022-01-01 RX ORDER — MORPHINE SULFATE 30 MG/1
2.5 TABLET ORAL
COMMUNITY

## 2022-01-01 RX ORDER — HYOSCYAMINE SULFATE 0.125 MG
0.12 TABLET,DISINTEGRATING ORAL EVERY 4 HOURS PRN
COMMUNITY

## 2022-01-01 RX ORDER — AMOXICILLIN 250 MG
1 CAPSULE ORAL DAILY PRN
COMMUNITY

## 2022-01-01 RX ORDER — OXYCODONE HYDROCHLORIDE 5 MG/1
2.5 TABLET ORAL EVERY 4 HOURS PRN
Qty: 12 TABLET | Refills: 0 | Status: SHIPPED | OUTPATIENT
Start: 2022-01-01 | End: 2022-01-01

## 2022-01-01 RX ORDER — ACETAMINOPHEN 650 MG/1
650 SUPPOSITORY RECTAL EVERY 4 HOURS PRN
Qty: 12 SUPPOSITORY | Refills: 0 | Status: ON HOLD | OUTPATIENT
Start: 2022-01-01 | End: 2023-01-01

## 2022-01-01 RX ORDER — DULOXETIN HYDROCHLORIDE 30 MG/1
30 CAPSULE, DELAYED RELEASE ORAL DAILY
Qty: 3 CAPSULE | Refills: 0 | Status: SHIPPED | OUTPATIENT
Start: 2022-01-01 | End: 2023-01-01 | Stop reason: DRUGHIGH

## 2022-01-01 RX ORDER — PROCHLORPERAZINE MALEATE 10 MG
10 TABLET ORAL EVERY 6 HOURS PRN
COMMUNITY
End: 2022-01-01

## 2022-01-01 RX ORDER — BISACODYL 10 MG
10 SUPPOSITORY, RECTAL RECTAL DAILY PRN
COMMUNITY

## 2022-01-01 RX ORDER — MORPHINE SULFATE 30 MG/1
5 TABLET ORAL 2 TIMES DAILY
Status: ON HOLD | COMMUNITY
End: 2023-01-01

## 2022-01-01 RX ORDER — LORAZEPAM 1 MG/1
1 TABLET ORAL
Qty: 16 TABLET | Refills: 0 | Status: SHIPPED | OUTPATIENT
Start: 2022-01-01 | End: 2023-01-01 | Stop reason: DRUGHIGH

## 2022-01-01 RX ADMIN — IPRATROPIUM BROMIDE AND ALBUTEROL SULFATE 3 ML: 2.5; .5 SOLUTION RESPIRATORY (INHALATION) at 08:20

## 2022-01-01 RX ADMIN — IPRATROPIUM BROMIDE AND ALBUTEROL SULFATE 3 ML: 2.5; .5 SOLUTION RESPIRATORY (INHALATION) at 20:04

## 2022-01-01 RX ADMIN — FUROSEMIDE 20 MG: 10 INJECTION, SOLUTION INTRAMUSCULAR; INTRAVENOUS at 09:13

## 2022-01-01 RX ADMIN — FUROSEMIDE 20 MG: 10 INJECTION, SOLUTION INTRAMUSCULAR; INTRAVENOUS at 15:37

## 2022-01-01 RX ADMIN — IPRATROPIUM BROMIDE AND ALBUTEROL SULFATE 3 ML: 2.5; .5 SOLUTION RESPIRATORY (INHALATION) at 15:47

## 2022-01-01 RX ADMIN — IPRATROPIUM BROMIDE AND ALBUTEROL SULFATE 3 ML: 2.5; .5 SOLUTION RESPIRATORY (INHALATION) at 07:57

## 2022-01-01 RX ADMIN — DULOXETINE HYDROCHLORIDE 30 MG: 30 CAPSULE, DELAYED RELEASE ORAL at 09:13

## 2022-01-01 RX ADMIN — IPRATROPIUM BROMIDE AND ALBUTEROL SULFATE 3 ML: 2.5; .5 SOLUTION RESPIRATORY (INHALATION) at 11:56

## 2022-01-01 ASSESSMENT — ACTIVITIES OF DAILY LIVING (ADL)
ADLS_ACUITY_SCORE: 59
ADLS_ACUITY_SCORE: 58
ADLS_ACUITY_SCORE: 59
ADLS_ACUITY_SCORE: 58
ADLS_ACUITY_SCORE: 59
ADLS_ACUITY_SCORE: 58
ADLS_ACUITY_SCORE: 59

## 2022-04-17 ENCOUNTER — APPOINTMENT (OUTPATIENT)
Dept: CT IMAGING | Facility: CLINIC | Age: 82
End: 2022-04-17
Attending: EMERGENCY MEDICINE
Payer: COMMERCIAL

## 2022-04-17 ENCOUNTER — HOSPITAL ENCOUNTER (OUTPATIENT)
Facility: CLINIC | Age: 82
Setting detail: OBSERVATION
Discharge: HOME | End: 2022-04-18
Attending: EMERGENCY MEDICINE | Admitting: HOSPITALIST
Payer: COMMERCIAL

## 2022-04-17 ENCOUNTER — APPOINTMENT (OUTPATIENT)
Dept: GENERAL RADIOLOGY | Facility: CLINIC | Age: 82
End: 2022-04-17
Attending: EMERGENCY MEDICINE
Payer: COMMERCIAL

## 2022-04-17 DIAGNOSIS — E87.6 HYPOKALEMIA: ICD-10-CM

## 2022-04-17 DIAGNOSIS — R62.7 FAILURE TO THRIVE IN ADULT: ICD-10-CM

## 2022-04-17 DIAGNOSIS — M62.81 GENERALIZED MUSCLE WEAKNESS: ICD-10-CM

## 2022-04-17 LAB
ALBUMIN SERPL-MCNC: 3.5 G/DL (ref 3.4–5)
ALBUMIN UR-MCNC: 30 MG/DL
ALP SERPL-CCNC: 101 U/L (ref 40–150)
ALT SERPL W P-5'-P-CCNC: 45 U/L (ref 0–50)
ANION GAP SERPL CALCULATED.3IONS-SCNC: 8 MMOL/L (ref 3–14)
APPEARANCE UR: CLEAR
AST SERPL W P-5'-P-CCNC: 27 U/L (ref 0–45)
BASOPHILS # BLD AUTO: 0 10E3/UL (ref 0–0.2)
BASOPHILS NFR BLD AUTO: 1 %
BILIRUB SERPL-MCNC: 1.1 MG/DL (ref 0.2–1.3)
BILIRUB UR QL STRIP: NEGATIVE
BUN SERPL-MCNC: 12 MG/DL (ref 7–30)
CALCIUM SERPL-MCNC: 8.2 MG/DL (ref 8.5–10.1)
CHLORIDE BLD-SCNC: 101 MMOL/L (ref 94–109)
CO2 SERPL-SCNC: 30 MMOL/L (ref 20–32)
COLOR UR AUTO: ABNORMAL
CREAT SERPL-MCNC: 0.93 MG/DL (ref 0.52–1.04)
EOSINOPHIL # BLD AUTO: 0.1 10E3/UL (ref 0–0.7)
EOSINOPHIL NFR BLD AUTO: 1 %
ERYTHROCYTE [DISTWIDTH] IN BLOOD BY AUTOMATED COUNT: 13.5 % (ref 10–15)
FLUAV RNA SPEC QL NAA+PROBE: NEGATIVE
FLUBV RNA RESP QL NAA+PROBE: NEGATIVE
GFR SERPL CREATININE-BSD FRML MDRD: 61 ML/MIN/1.73M2
GLUCOSE BLD-MCNC: 106 MG/DL (ref 70–99)
GLUCOSE UR STRIP-MCNC: NEGATIVE MG/DL
HBA1C MFR BLD: 5.5 % (ref 0–5.6)
HCO3 BLDV-SCNC: 35 MMOL/L (ref 21–28)
HCT VFR BLD AUTO: 38.8 % (ref 35–47)
HGB BLD-MCNC: 12.9 G/DL (ref 11.7–15.7)
HGB UR QL STRIP: NEGATIVE
HOLD SPECIMEN: NORMAL
IMM GRANULOCYTES # BLD: 0 10E3/UL
IMM GRANULOCYTES NFR BLD: 0 %
KETONES UR STRIP-MCNC: 10 MG/DL
LACTATE BLD-SCNC: 0.6 MMOL/L
LACTATE SERPL-SCNC: 0.8 MMOL/L (ref 0.7–2)
LEUKOCYTE ESTERASE UR QL STRIP: NEGATIVE
LIPASE SERPL-CCNC: 128 U/L (ref 73–393)
LYMPHOCYTES # BLD AUTO: 1.1 10E3/UL (ref 0.8–5.3)
LYMPHOCYTES NFR BLD AUTO: 19 %
MAGNESIUM SERPL-MCNC: 2.2 MG/DL (ref 1.6–2.3)
MAGNESIUM SERPL-MCNC: 2.4 MG/DL (ref 1.6–2.3)
MCH RBC QN AUTO: 30.9 PG (ref 26.5–33)
MCHC RBC AUTO-ENTMCNC: 33.2 G/DL (ref 31.5–36.5)
MCV RBC AUTO: 93 FL (ref 78–100)
MONOCYTES # BLD AUTO: 0.5 10E3/UL (ref 0–1.3)
MONOCYTES NFR BLD AUTO: 9 %
MUCOUS THREADS #/AREA URNS LPF: PRESENT /LPF
NEUTROPHILS # BLD AUTO: 4.1 10E3/UL (ref 1.6–8.3)
NEUTROPHILS NFR BLD AUTO: 70 %
NITRATE UR QL: NEGATIVE
NRBC # BLD AUTO: 0 10E3/UL
NRBC BLD AUTO-RTO: 0 /100
PCO2 BLDV: 41 MM HG (ref 40–50)
PH BLDV: 7.53 [PH] (ref 7.32–7.43)
PH UR STRIP: 6 [PH] (ref 5–7)
PHOSPHATE SERPL-MCNC: 3 MG/DL (ref 2.5–4.5)
PLATELET # BLD AUTO: 252 10E3/UL (ref 150–450)
PO2 BLDV: 52 MM HG (ref 25–47)
POTASSIUM BLD-SCNC: 2.7 MMOL/L (ref 3.4–5.3)
POTASSIUM BLD-SCNC: 2.7 MMOL/L (ref 3.4–5.3)
PROT SERPL-MCNC: 7 G/DL (ref 6.8–8.8)
RBC # BLD AUTO: 4.18 10E6/UL (ref 3.8–5.2)
RBC URINE: <1 /HPF
RSV RNA SPEC NAA+PROBE: NEGATIVE
SAO2 % BLDV: 90 % (ref 94–100)
SARS-COV-2 RNA RESP QL NAA+PROBE: NEGATIVE
SODIUM SERPL-SCNC: 139 MMOL/L (ref 133–144)
SP GR UR STRIP: 1.01 (ref 1–1.03)
UROBILINOGEN UR STRIP-MCNC: NORMAL MG/DL
WBC # BLD AUTO: 5.8 10E3/UL (ref 4–11)
WBC URINE: <1 /HPF

## 2022-04-17 PROCEDURE — 81001 URINALYSIS AUTO W/SCOPE: CPT | Performed by: EMERGENCY MEDICINE

## 2022-04-17 PROCEDURE — 70450 CT HEAD/BRAIN W/O DYE: CPT

## 2022-04-17 PROCEDURE — 250N000011 HC RX IP 250 OP 636: Performed by: EMERGENCY MEDICINE

## 2022-04-17 PROCEDURE — 85025 COMPLETE CBC W/AUTO DIFF WBC: CPT | Performed by: EMERGENCY MEDICINE

## 2022-04-17 PROCEDURE — 36415 COLL VENOUS BLD VENIPUNCTURE: CPT | Performed by: HOSPITALIST

## 2022-04-17 PROCEDURE — C9803 HOPD COVID-19 SPEC COLLECT: HCPCS

## 2022-04-17 PROCEDURE — 83735 ASSAY OF MAGNESIUM: CPT | Performed by: EMERGENCY MEDICINE

## 2022-04-17 PROCEDURE — 250N000009 HC RX 250: Performed by: HOSPITALIST

## 2022-04-17 PROCEDURE — 99285 EMERGENCY DEPT VISIT HI MDM: CPT | Mod: 25

## 2022-04-17 PROCEDURE — G0378 HOSPITAL OBSERVATION PER HR: HCPCS

## 2022-04-17 PROCEDURE — 87637 SARSCOV2&INF A&B&RSV AMP PRB: CPT | Performed by: EMERGENCY MEDICINE

## 2022-04-17 PROCEDURE — 250N000011 HC RX IP 250 OP 636: Performed by: HOSPITALIST

## 2022-04-17 PROCEDURE — 83690 ASSAY OF LIPASE: CPT | Performed by: EMERGENCY MEDICINE

## 2022-04-17 PROCEDURE — 250N000013 HC RX MED GY IP 250 OP 250 PS 637: Performed by: HOSPITALIST

## 2022-04-17 PROCEDURE — 83605 ASSAY OF LACTIC ACID: CPT | Performed by: EMERGENCY MEDICINE

## 2022-04-17 PROCEDURE — 258N000003 HC RX IP 258 OP 636: Performed by: HOSPITALIST

## 2022-04-17 PROCEDURE — 96374 THER/PROPH/DIAG INJ IV PUSH: CPT

## 2022-04-17 PROCEDURE — 74176 CT ABD & PELVIS W/O CONTRAST: CPT

## 2022-04-17 PROCEDURE — 99220 PR INITIAL OBSERVATION CARE,LEVEL III: CPT | Performed by: HOSPITALIST

## 2022-04-17 PROCEDURE — 80053 COMPREHEN METABOLIC PANEL: CPT | Performed by: EMERGENCY MEDICINE

## 2022-04-17 PROCEDURE — 83735 ASSAY OF MAGNESIUM: CPT | Performed by: HOSPITALIST

## 2022-04-17 PROCEDURE — 36415 COLL VENOUS BLD VENIPUNCTURE: CPT | Performed by: EMERGENCY MEDICINE

## 2022-04-17 PROCEDURE — 84132 ASSAY OF SERUM POTASSIUM: CPT | Performed by: HOSPITALIST

## 2022-04-17 PROCEDURE — 71045 X-RAY EXAM CHEST 1 VIEW: CPT

## 2022-04-17 PROCEDURE — 84100 ASSAY OF PHOSPHORUS: CPT | Performed by: HOSPITALIST

## 2022-04-17 PROCEDURE — 96376 TX/PRO/DX INJ SAME DRUG ADON: CPT

## 2022-04-17 PROCEDURE — 258N000003 HC RX IP 258 OP 636: Performed by: EMERGENCY MEDICINE

## 2022-04-17 PROCEDURE — 93005 ELECTROCARDIOGRAM TRACING: CPT

## 2022-04-17 PROCEDURE — 83036 HEMOGLOBIN GLYCOSYLATED A1C: CPT | Performed by: HOSPITALIST

## 2022-04-17 PROCEDURE — 82803 BLOOD GASES ANY COMBINATION: CPT

## 2022-04-17 RX ORDER — FUROSEMIDE 20 MG
20 TABLET ORAL DAILY
COMMUNITY
End: 2022-06-03

## 2022-04-17 RX ORDER — FUROSEMIDE 20 MG
20 TABLET ORAL DAILY
Status: DISCONTINUED | OUTPATIENT
Start: 2022-04-18 | End: 2022-04-18 | Stop reason: HOSPADM

## 2022-04-17 RX ORDER — AZELASTINE HYDROCHLORIDE 0.5 MG/ML
SOLUTION/ DROPS OPHTHALMIC
Status: ON HOLD | COMMUNITY
End: 2022-06-15

## 2022-04-17 RX ORDER — AMLODIPINE BESYLATE 5 MG/1
5 TABLET ORAL 2 TIMES DAILY
Status: DISCONTINUED | OUTPATIENT
Start: 2022-04-17 | End: 2022-04-18 | Stop reason: HOSPADM

## 2022-04-17 RX ORDER — POTASSIUM CHLORIDE 1500 MG/1
40 TABLET, EXTENDED RELEASE ORAL ONCE
Status: DISCONTINUED | OUTPATIENT
Start: 2022-04-17 | End: 2022-04-17

## 2022-04-17 RX ORDER — ONDANSETRON 2 MG/ML
4 INJECTION INTRAMUSCULAR; INTRAVENOUS EVERY 6 HOURS PRN
Status: DISCONTINUED | OUTPATIENT
Start: 2022-04-17 | End: 2022-04-18 | Stop reason: HOSPADM

## 2022-04-17 RX ORDER — DULOXETIN HYDROCHLORIDE 60 MG/1
60 CAPSULE, DELAYED RELEASE ORAL DAILY
Status: ON HOLD | COMMUNITY
End: 2022-01-01

## 2022-04-17 RX ORDER — ATORVASTATIN CALCIUM 10 MG/1
10 TABLET, FILM COATED ORAL DAILY
Status: DISCONTINUED | OUTPATIENT
Start: 2022-04-18 | End: 2022-04-18 | Stop reason: HOSPADM

## 2022-04-17 RX ORDER — ONDANSETRON 4 MG/1
4 TABLET, ORALLY DISINTEGRATING ORAL EVERY 6 HOURS PRN
Status: DISCONTINUED | OUTPATIENT
Start: 2022-04-17 | End: 2022-04-18 | Stop reason: HOSPADM

## 2022-04-17 RX ORDER — NALOXONE HYDROCHLORIDE 0.4 MG/ML
0.2 INJECTION, SOLUTION INTRAMUSCULAR; INTRAVENOUS; SUBCUTANEOUS
Status: DISCONTINUED | OUTPATIENT
Start: 2022-04-17 | End: 2022-04-18 | Stop reason: HOSPADM

## 2022-04-17 RX ORDER — POTASSIUM CHLORIDE 7.45 MG/ML
10 INJECTION INTRAVENOUS ONCE
Status: COMPLETED | OUTPATIENT
Start: 2022-04-17 | End: 2022-04-17

## 2022-04-17 RX ORDER — AMLODIPINE BESYLATE 5 MG/1
5 TABLET ORAL 2 TIMES DAILY
COMMUNITY
End: 2022-01-01

## 2022-04-17 RX ORDER — POTASSIUM CHLORIDE 1.5 G/1.58G
40 POWDER, FOR SOLUTION ORAL ONCE
Status: DISCONTINUED | OUTPATIENT
Start: 2022-04-17 | End: 2022-04-18 | Stop reason: ALTCHOICE

## 2022-04-17 RX ORDER — SODIUM CHLORIDE, SODIUM LACTATE, POTASSIUM CHLORIDE, CALCIUM CHLORIDE 600; 310; 30; 20 MG/100ML; MG/100ML; MG/100ML; MG/100ML
INJECTION, SOLUTION INTRAVENOUS CONTINUOUS
Status: DISCONTINUED | OUTPATIENT
Start: 2022-04-17 | End: 2022-04-18 | Stop reason: HOSPADM

## 2022-04-17 RX ORDER — ATORVASTATIN CALCIUM 10 MG/1
10 TABLET, FILM COATED ORAL DAILY
COMMUNITY
End: 2022-06-03

## 2022-04-17 RX ORDER — POTASSIUM CHLORIDE 7.45 MG/ML
10 INJECTION INTRAVENOUS
Status: COMPLETED | OUTPATIENT
Start: 2022-04-17 | End: 2022-04-18

## 2022-04-17 RX ORDER — DULOXETIN HYDROCHLORIDE 60 MG/1
60 CAPSULE, DELAYED RELEASE ORAL DAILY
Status: DISCONTINUED | OUTPATIENT
Start: 2022-04-18 | End: 2022-04-18 | Stop reason: HOSPADM

## 2022-04-17 RX ORDER — NALOXONE HYDROCHLORIDE 0.4 MG/ML
0.4 INJECTION, SOLUTION INTRAMUSCULAR; INTRAVENOUS; SUBCUTANEOUS
Status: DISCONTINUED | OUTPATIENT
Start: 2022-04-17 | End: 2022-04-18 | Stop reason: HOSPADM

## 2022-04-17 RX ORDER — AZELASTINE HYDROCHLORIDE 0.5 MG/ML
1 SOLUTION/ DROPS OPHTHALMIC 2 TIMES DAILY
Status: DISCONTINUED | OUTPATIENT
Start: 2022-04-17 | End: 2022-04-18 | Stop reason: HOSPADM

## 2022-04-17 RX ORDER — LISINOPRIL 10 MG/1
10 TABLET ORAL DAILY
Status: DISCONTINUED | OUTPATIENT
Start: 2022-04-17 | End: 2022-04-18 | Stop reason: HOSPADM

## 2022-04-17 RX ADMIN — POTASSIUM CHLORIDE 10 MEQ: 7.46 INJECTION, SOLUTION INTRAVENOUS at 23:07

## 2022-04-17 RX ADMIN — AMLODIPINE BESYLATE 5 MG: 5 TABLET ORAL at 21:59

## 2022-04-17 RX ADMIN — POTASSIUM CHLORIDE 10 MEQ: 7.46 INJECTION, SOLUTION INTRAVENOUS at 16:28

## 2022-04-17 RX ADMIN — SODIUM CHLORIDE, POTASSIUM CHLORIDE, SODIUM LACTATE AND CALCIUM CHLORIDE: 600; 310; 30; 20 INJECTION, SOLUTION INTRAVENOUS at 15:24

## 2022-04-17 RX ADMIN — AZELASTINE HYDROCHLORIDE 1 DROP: 0.5 SOLUTION/ DROPS INTRAOCULAR at 22:00

## 2022-04-17 RX ADMIN — POTASSIUM CHLORIDE 10 MEQ: 7.46 INJECTION, SOLUTION INTRAVENOUS at 20:20

## 2022-04-17 RX ADMIN — SODIUM CHLORIDE 1000 ML: 9 INJECTION, SOLUTION INTRAVENOUS at 10:43

## 2022-04-17 RX ADMIN — POTASSIUM CHLORIDE 10 MEQ: 7.46 INJECTION, SOLUTION INTRAVENOUS at 18:08

## 2022-04-17 RX ADMIN — POTASSIUM CHLORIDE 10 MEQ: 7.46 INJECTION, SOLUTION INTRAVENOUS at 12:10

## 2022-04-17 ASSESSMENT — ENCOUNTER SYMPTOMS
NAUSEA: 1
APPETITE CHANGE: 1
DYSURIA: 0
DIFFICULTY URINATING: 0
WEAKNESS: 1
HEMATURIA: 0
DIARRHEA: 0
FREQUENCY: 0
FEVER: 1
ACTIVITY CHANGE: 1
VOMITING: 0

## 2022-04-17 ASSESSMENT — COLUMBIA-SUICIDE SEVERITY RATING SCALE - C-SSRS
2. HAVE YOU ACTUALLY HAD ANY THOUGHTS OF KILLING YOURSELF IN THE PAST MONTH?: NO
1. IN THE PAST MONTH, HAVE YOU WISHED YOU WERE DEAD OR WISHED YOU COULD GO TO SLEEP AND NOT WAKE UP?: NO

## 2022-04-17 NOTE — ED NOTES
"Attempted to give pt potassium. Pt would not verbalize and would not open mouth to take tablet. Daughter stated \"She is very stubborn.\" Will document pt refusal and started pt on IV potassium supplementation.  "
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
## 2022-04-17 NOTE — H&P
[facility]    History and Physical  Hospitalist     Date of Admission:  4/17/2022  Date of Service (when I saw the patient): 04/17/22  Provider: Rudy Cochran MD      Chief Complaint   Weakness     History is obtained from the patient, electronic health record and emergency department physician    History of Present Illness   See Spring is a 82 year old female who has past medical history of hypertension presents with weakness.  She is brought to the emergency department by her daughter who help with translation since the patient is non-English-speaking (her language Is ONG).  According to the daughter the patient has been refusing meals at home, even poor oral intake of water.  No fever has been documented.  Apparently the patient has phases of negative behavior in which she does not communicate with the rest of the family, eat or drink for unclear reasons.  Here in the emergency department she has not talked to any providers including myself, she is not answering greetings, shake hands or answer questions.  In the daughter's opinion she is very stubborn.  She is not disheveled in her appearance.  She does not has toxemic aspect.  She is slim but seems to be well-nourished.  She cooperates with physical exam. she does not cooperate with interview.  I have discussed her case with Dr. Bryant the emergency department physician, he has performed a thorough exam and battery of tests with revealing findings of hypokalemia, hypocalcemia and hypomagnesemia.  Glycemia elevation to 106.  CBC within normal limits  Negative UA and long QTi in the EKG.  Vital signs:  Temp: 98.5  F (36.9  C) Temp src: Oral BP: 129/68 Pulse: 85   Resp: 20 SpO2: 95 % O2 Device: None (Room air)     Weight: 52.2 kg (115 lb)  Estimated body mass index is 22.46 kg/m  as calculated from the following:    Height as of 6/24/21: 1.524 m (5').    Weight as of this encounter: 52.2 kg (115 lb).    CT of the abdomen and  pelvis  IMPRESSION:   1.  No abnormalities are noted to explain nausea.  2.  Incidental note is made of a low dense lobulated, presumed 2.5 x 1.8 x 1.3 cm cyst in the right lower quadrant as noted above. This is quite superior to reflect a right adnexal lesion and in retrospect there is a suggestion of a tiny lesion in this   location 11 years earlier. Could this reflect an incidental mesenteric cyst, slowly increased in size? It has  a relatively benign finding and either further evaluation or follow-up can be done as clinically indicated.  3.  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 Findings are stable.  4.  Mild colonic diverticulosis without evidence of diverticulitis.  5.  Mild postcholecystectomy distention of the biliary tree.  6.  Removal of the previously noted biliary catheter.  CT of the head and neck  IMPRESSION:  1.  No acute intracranial hemorrhage.  2.  Moderately severe cerebral small vessel disease. Chronic right cerebellar infarction.  3.  Brain atrophy.  CXR  IMPRESSION: Single AP view of the chest was obtained. Enlarged cardiac silhouette and mild pulmonary vascular congestion. Mild bibasilar pulmonary opacities, likely atelectasis. No significant pleural effusion or pneumothorax.    Past Medical History    Essential hypertension  Hyperlipidemia.  GERD.  Possible borderline diabetes.        Assessment & Plan   See Spring is a 82 year old female who presents with weakness after several days refusing oral intake and not talking to the family.  No fever or any other acute symptoms have been documented.  In the emergency department she has been nontoxic with hypokalemia, hypocalcemia and hypomagnesemia.  Her EKG shows a long QTi.  There are many incidental findings in the MRI of the abdomen and pelvis as well on CT of the head, no new findings, stable.  She is nonfocal  on talk to her daughter occasionally but she does not talk to the medical staff taking care of her in the emergency department or this writer.    1.  Weakness, poor oral intake, abnormal electrolytes.  This seems to be a main contributor to her presentation, however the cause why she is not eating and drinking properly is hard to decipher given the language barrier and cultural values.  Her daughter reports to me that they are has 11 siblings and she is the only one taking care of her mother.  She would like  consultation that can help.  -Admit to observation  -Vitals per unit routine.  -Regular diet or as tolerated.  -Telemetry.  -Electrolyte correction per protocol  -Hydration with Ringer lactate 100 mL/h.  -Physical therapy consult Occupational Therapy assessment and treatment.  - consultation.    2.  Essential hypertension.  -Hold treatment with amlodipine, furosemide and lisinopril.  3.  Hyperlipidemia.  -On treatment with atorvastatin.  4.  GERD.  5.  Depression on duloxetine.  6.  History of right hemisphere stroke.  Aspirin 325 mg daily  7.  Hypovitaminosis D.?  She  takes vitamin D supplement.      Clinically Significant Risk Factors Present on Admission        # Hypokalemia: K = 2.7 mmol/L (Ref range: 3.4 - 5.3 mmol/L) on admission, will replace as needed   # Hypocalcemia: Ca = 8.2 mg/dL (Ref range: 8.5 - 10.1 mg/dL) and/or iCa = N/A on admission, will replace as needed       # Platelet Defect: home medication list includes an antiplatelet medication           Code Status   Full Code    Primary Care Physician   Physician No Ref-Primary      Past Surgical History   I have reviewed this patient's surgical history and updated it with pertinent information if needed.  Past Surgical History:   Procedure Laterality Date     IR BILIARY TUBE CHANGE  5/26/2011     IR TRANSCATHETER BIOPSY  5/26/2011       Prior to Admission Medications   Prior to Admission Medications   Prescriptions Last  Dose Informant Patient Reported? Taking?   aspirin (ASA) 325 MG EC tablet   No No   Sig: Take 1 tablet (325 mg) by mouth every 6 hours as needed for moderate pain   lisinopril (ZESTRIL) 10 MG tablet   No No   Sig: Take 1 tablet (10 mg) by mouth daily      Facility-Administered Medications: None     Allergies   Allergies   Allergen Reactions     Gabapentin Unknown       Social History   I have personally reviewed the social history with the patient showing.  Social History     Tobacco Use     Smoking status:  Home life non-smoker     Smokeless tobacco: Never   Substance Use Topics     Alcohol use:  Not a drinker     Family History   I have reviewed this patient's family history and it is not contributory to the admission .        Review of Systems   Except as noted in the HPI, a 12-system Review of Systems was found to be negative.      Physical Exam   Vital Signs with Ranges  Temp:  [98.4  F (36.9  C)] 98.4  F (36.9  C)  Pulse:  [82-86] 86  Resp:  [18-24] 24  BP: (132-134)/(76-81) 134/76  SpO2:  [96 %] 96 %  115 lbs 0 oz    GEN: Non-English-speaking (Hmong). Alert, noncooperative.  Appears comfortable, NAD.  HEENT:  Normocephalic/atraumatic, no scleral icterus, no nasal discharge, mouth moist.  CV:  Regular rate and rhythm, no murmur or JVD.  S1 + S2 noted, no S3 or S4.  LUNGS:  Clear to auscultation bilaterally without rales/rhonchi/wheezing/retractions.  Symmetric chest rise on inhalation noted.  ABD:  Active bowel sounds, soft, non-tender/non-distended.  No rebound/guarding/rigidity.  EXT:  No edema or cyanosis.  No joint synovitis noted.  SKIN:  Dry to touch, no exanthems noted in the visualized areas.       Data   I personally reviewed the EKG tracing showing NSR with long QTi.  Results for orders placed or performed during the hospital encounter of 04/17/22 (from the past 24 hour(s))   [facility] Draw    Narrative    The following orders were created for panel order [facility].  Procedure                                Abnormality         Status                     ---------                               -----------         ------                     Extra Blue Top Tube[[id number]]                              Final result               Extra Red Top Tube[[id number]]                               Final result               Extra Green Top (Lithium...[[id number]]                      Final result               Extra Purple Top Tube[[id number]]                            Final result                 Please view results for these tests on the individual orders.   Lactic acid whole blood   Result Value Ref Range    Lactic Acid 0.8 0.7 - 2.0 mmol/L   Extra Blue Top Tube   Result Value Ref Range    Hold Specimen JIC    Extra Red Top Tube   Result Value Ref Range    Hold Specimen JIC    Extra Green Top (Lithium Heparin) Tube   Result Value Ref Range    Hold Specimen JIC    Extra Purple Top Tube   Result Value Ref Range    Hold Specimen JIC    CBC with platelets differential    Narrative    The following orders were created for panel order CBC with platelets differential.  Procedure                               Abnormality         Status                     ---------                               -----------         ------                     CBC with platelets and d...[[id number]]                      Final result                 Please view results for these tests on the individual orders.   CBC with platelets and differential   Result Value Ref Range    WBC Count 5.8 4.0 - 11.0 10e3/uL    RBC Count 4.18 3.80 - 5.20 10e6/uL    Hemoglobin 12.9 11.7 - 15.7 g/dL    Hematocrit 38.8 35.0 - 47.0 %    MCV 93 78 - 100 fL    MCH 30.9 26.5 - 33.0 pg    MCHC 33.2 31.5 - 36.5 g/dL    RDW 13.5 10.0 - 15.0 %    Platelet Count 252 150 - 450 10e3/uL    % Neutrophils 70 %    % Lymphocytes 19 %    % Monocytes 9 %    % Eosinophils 1 %    % Basophils 1 %    % Immature Granulocytes 0 %    NRBCs per 100 WBC 0 <1 /100    Absolute Neutrophils 4.1 1.6 - 8.3  10e3/uL    Absolute Lymphocytes 1.1 0.8 - 5.3 10e3/uL    Absolute Monocytes 0.5 0.0 - 1.3 10e3/uL    Absolute Eosinophils 0.1 0.0 - 0.7 10e3/uL    Absolute Basophils 0.0 0.0 - 0.2 10e3/uL    Absolute Immature Granulocytes 0.0 <=0.4 10e3/uL    Absolute NRBCs 0.0 10e3/uL   EKG 12-lead, tracing only   Result Value Ref Range    Systolic Blood Pressure  mmHg    Diastolic Blood Pressure  mmHg    Ventricular Rate 87 BPM    Atrial Rate 87 BPM    MN Interval 186 ms    QRS Duration 94 ms     ms    QTc 570 ms    P Axis 59 degrees    R AXIS -18 degrees    T Axis 25 degrees    Interpretation ECG       Sinus rhythm  Minimal voltage criteria for LVH, may be normal variant  Possible Anterior infarct , age undetermined  Prolonged QT  Abnormal ECG  When compared with ECG of 24-JUN-2021 20:06,  Nonspecific T wave abnormality now evident in Anterolateral leads  QT has lengthened     [facility] Draw    Narrative    The following orders were created for panel order [place] Draw.  Procedure                               Abnormality         Status                     ---------                               -----------         ------                     Extra Green Top (Lithium...[[id number]]                      Final result                 Please view results for these tests on the individual orders.   Extra Green Top (Lithium Heparin) Tube   Result Value Ref Range    Hold Specimen JIC    iStat Gases (lactate) venous, POCT   Result Value Ref Range    Lactic Acid POCT 0.6 <=2.0 mmol/L    Bicarbonate Venous POCT 35 (H) 21 - 28 mmol/L    O2 Sat, Venous POCT 90 (L) 94 - 100 %    pCO2V Venous POCT 41 40 - 50 mm Hg    pH Venous POCT 7.53 (H) 7.32 - 7.43    pO2 Venous POCT 52 (H) 25 - 47 mm Hg   Comprehensive metabolic panel   Result Value Ref Range    Sodium 139 133 - 144 mmol/L    Potassium 2.7 (L) 3.4 - 5.3 mmol/L    Chloride 101 94 - 109 mmol/L    Carbon Dioxide (CO2) 30 20 - 32 mmol/L    Anion Gap 8 3 - 14 mmol/L    Urea Nitrogen 12 7 -  30 mg/dL    Creatinine 0.93 0.52 - 1.04 mg/dL    Calcium 8.2 (L) 8.5 - 10.1 mg/dL    Glucose 106 (H) 70 - 99 mg/dL    Alkaline Phosphatase 101 40 - 150 U/L    AST 27 0 - 45 U/L    ALT 45 0 - 50 U/L    Protein Total 7.0 6.8 - 8.8 g/dL    Albumin 3.5 3.4 - 5.0 g/dL    Bilirubin Total 1.1 0.2 - 1.3 mg/dL    GFR Estimate 61 >60 mL/min/1.73m2   Lipase   Result Value Ref Range    Lipase 128 73 - 393 U/L   Magnesium   Result Value Ref Range    Magnesium 2.4 (H) 1.6 - 2.3 mg/dL   Symptomatic; Unknown Influenza A/B & SARS-CoV2 (COVID-19) Virus PCR Multiplex Nasopharyngeal    Specimen: Nasopharyngeal; Swab   Result Value Ref Range    Influenza A PCR Negative Negative    Influenza B PCR Negative Negative    RSV PCR Negative Negative    SARS CoV2 PCR Negative Negative    Narrative    Testing was performed using the Xpert Xpress CoV2/Flu/RSV Assay on the Cepheid GeneXpert Instrument. This test should be ordered for the detection of SARS-CoV-2 and influenza viruses in individuals who meet clinical and/or epidemiological criteria. Test performance is unknown in asymptomatic patients. This test is for in vitro diagnostic use under the FDA EUA for laboratories certified under CLIA to perform high or moderate complexity testing. This test has not been FDA cleared or approved. A negative result does not rule out the presence of PCR inhibitors in the specimen or target RNA in concentration below the limit of detection for the assay. If only one viral target is positive but coinfection with multiple targets is suspected, the sample should be re-tested with another FDA cleared, approved, or authorized test, if coinfection would change clinical management. This test was validated by the [facility]. These laboratories are certified under the Clinical  Laboratory Improvement Amendments of 1988 (CLIA-88) as qualified to perform high complexity laboratory testing.   UA with Microscopic reflex to Culture    Specimen: Urine,  Catheter   Result Value Ref Range    Color Urine Light Yellow Colorless, Straw, Light Yellow, Yellow    Appearance Urine Clear Clear    Glucose Urine Negative Negative mg/dL    Bilirubin Urine Negative Negative    Ketones Urine 10  (A) Negative mg/dL    Specific Gravity Urine 1.012 1.003 - 1.035    Blood Urine Negative Negative    pH Urine 6.0 5.0 - 7.0    Protein Albumin Urine 30  (A) Negative mg/dL    Urobilinogen Urine Normal Normal, 2.0 mg/dL    Nitrite Urine Negative Negative    Leukocyte Esterase Urine Negative Negative    Mucus Urine Present (A) None Seen /LPF    RBC Urine <1 <=2 /HPF    WBC Urine <1 <=5 /HPF    Narrative    Urine Culture not indicated   Abd/pelvis CT no contrast - [facility] Protocol    Narrative    EXAM: CT ABDOMEN PELVIS W/O CONTRAST  LOCATION: [facility]  DATE/TIME: 4/17/2022 10:52 AM    INDICATION: nausea, not eating, hypokalemia  COMPARISON: 05/31/2011  TECHNIQUE: CT scan of the abdomen and pelvis was performed without IV contrast. Multiplanar reformats were obtained. Dose reduction techniques were used.  CONTRAST: None.    FINDINGS: Mild respiratory artifact.    LOWER CHEST: Multichamber cardiac enlargement. Mild subpleural atelectasis in the right base.    HEPATOBILIARY: Post cholecystectomy distention of the biliary tree. No intraductal stones. Removal of the previously noted the biliary catheter.    PANCREAS: Normal.    SPLEEN: Normal.    ADRENAL GLANDS: Normal.    KIDNEYS/BLADDER: Tiny cortical cyst seen projecting from the left kidney which need no follow-up.    BOWEL: There is a 2.5 x 1.8 x 1.3 cm low dense, lobulated mass in the right lower quadrant anterior to the right psoas muscle. This has a density of 12 Hounsfield units. In retrospect (prior study image 96), there is a suggestion of a tiny low dense   lesion in this area. No free fluid or inflammatory changes. Mild distal colonic diverticulosis.     LYMPH NODES: Normal.    VASCULATURE: Extensive  atherosclerotic vascular disease. Ectatic abdominal aorta which is mildly aneurysmal at 3 cm. There is a distal right common iliac artery measures 2.2 cm, unchanged. Left common iliac artery is ectatic.    PELVIC ORGANS: Normal.    MUSCULOSKELETAL: No suspicious lesions.      Impression    IMPRESSION:   1.  No abnormalities are noted to explain nausea.  2.  Incidental note is made of a low dense lobulated, presumed 2.5 x 1.8 x 1.3 cm cyst in the right lower quadrant as noted above. This is quite superior to reflect a right adnexal lesion and in retrospect there is a suggestion of a tiny lesion in this   location 11 years earlier. Could this reflect an incidental mesenteric cyst, slowly increased in size? It has  a relatively benign finding and either further evaluation or follow-up can be done as clinically indicated.  3.  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 Findings are stable.  4.  Mild colonic diverticulosis without evidence of diverticulitis.  5.  Mild postcholecystectomy distention of the biliary tree.  6.  Removal of the previously noted biliary catheter.     Head CT w/o contrast    Narrative    EXAM: CT HEAD W/O CONTRAST  LOCATION: [facility]  DATE/TIME: 4/17/2022 10:52 AM    INDICATION: AMS  COMPARISON: 06/24/2021  TECHNIQUE: Routine CT Head without IV contrast. Multiplanar reformats. Dose reduction techniques were used.    FINDINGS:  INTRACRANIAL CONTENTS: No intracranial hemorrhage, extraaxial collection, or mass effect.  No CT evidence of acute infarct. Chronic right basal ganglia and left thalamic lacunar infarctions. Chronic right cerebellar infarction. Moderate/severe cerebral   small vessel disease. Mild to moderate generalized volume loss. No hydrocephalus.     VISUALIZED ORBITS/SINUSES/MASTOIDS: Prior bilateral cataract surgery. Visualized  portions of the orbits are otherwise unremarkable. No paranasal sinus mucosal disease. No middle ear or mastoid effusion.    BONES/SOFT TISSUES: No acute abnormality.      Impression    IMPRESSION:  1.  No acute intracranial hemorrhage.  2.  Moderately severe cerebral small vessel disease. Chronic right cerebellar infarction.  3.  Brain atrophy.   XR Chest 1 View    Narrative    EXAM: XR CHEST 1 VIEW  LOCATION: [facility]  DATE/TIME: 04/17/2022, 11:03 AM    INDICATION: Cough.  COMPARISON: Chest x-ray on 05/31/2011. CT abdomen and pelvis on same day earlier.      Impression    IMPRESSION: Single AP view of the chest was obtained. Enlarged cardiac silhouette and mild pulmonary vascular congestion. Mild bibasilar pulmonary opacities, likely atelectasis. No significant pleural effusion or pneumothorax.         Securely message with the Vocera Web Console (learn more here)  Text page via [facility] Paging/Directory        Disclaimer: This note consists of symbols derived from keyboarding, dictation and/or voice recognition software. As a result, there may be errors in the script that have gone undetected. Please consider this when interpreting information found in this chart.

## 2022-04-17 NOTE — PHARMACY-ADMISSION MEDICATION HISTORY
Admission medication history interview status for this patient is complete. See [facility] admission navigator for allergy information, prior to admission medications and immunization status.     Medication history interview done, indicate source(s): Family  Medication history resources (including written lists, pill bottles, clinic record):None  Pharmacy: [facility]    Changes made to PTA medication list:  Added: Tylenol #3, atorvastatin, azelastine, vitamin D, duloxetine, furosemide, amlodipine  Changed: None  Reported as Not Taking: lisinopril  Removed: None    Actions taken by pharmacist (provider contacted, etc):None     Additional medication history information: Pt's daughter states pt only takes aspirin as needed for pain, has not used since taking Tylenol #3.     Medication reconciliation/reorder completed by provider prior to medication history?  N   (Y/N)         Prior to Admission medications    Medication Sig Last Dose Taking? Auth Provider   acetaminophen-codeine (TYLENOL #3) 300-30 MG tablet Take 1 tablet by mouth every 6 hours as needed for severe pain prn at prn Yes Unknown, Entered By History   amLODIPine (NORVASC) 5 MG tablet Take 5 mg by mouth 2 times daily 4/17/2022 at am Yes Unknown, Entered By History   aspirin (ASA) 325 MG EC tablet Take 1 tablet (325 mg) by mouth every 6 hours as needed for moderate pain prn at prn Yes Abdullahi Triplett MD   atorvastatin (LIPITOR) 10 MG tablet Take 10 mg by mouth daily 4/17/2022 at am Yes Unknown, Entered By History   azelastine (OPTIVAR) 0.05 % ophthalmic solution Instill 1 drop to affected eye twice daily as needed for itchy/watery eyes prn at prn Yes Unknown, Entered By History   cholecalciferol (VITAMIN D3) 125 mcg (5000 units) capsule Take 125 mcg by mouth daily 4/17/2022 at am Yes Unknown, Entered By History   DULoxetine (CYMBALTA) 60 MG capsule Take 60 mg by mouth daily 4/17/2022 at am Yes Unknown, Entered By History   furosemide (LASIX) 20 MG tablet Take 20  mg by mouth daily 4/17/2022 at am Yes Unknown, Entered By History   lisinopril (ZESTRIL) 10 MG tablet Take 1 tablet (10 mg) by mouth daily  Patient not taking: Reported on 4/17/2022 Not Taking at Unknown time  Abdullahi Triplett MD

## 2022-04-17 NOTE — ED TRIAGE NOTES
Stopped eating and decreased mobility since yesterday. Feeling warm to touch per family. Blood sugar 138. Lives with family who state pt is baseline neurologically. Family also reports pt normally has healthy appetite and are concerned she is not eating.

## 2022-04-17 NOTE — ED NOTES
Jimmy Regalado is a 52year old female  No LMP recorded. (Menstrual status: Chemo). Patient presents with:  Gyn Exam: Annual -- no change in history. No VB. On Tamoxifen  .     OBSTETRICS HISTORY:  OB History    Para Term  AB Living [facility]  ED Nurse Handoff Report    See Spring is a 82 year old female   ED Chief complaint: Generalized Weakness  . ED Diagnosis:   Final diagnoses:   Generalized muscle weakness   Hypokalemia   Failure to thrive in adult     Allergies:   Allergies   Allergen Reactions     Gabapentin Unknown       Code Status: Full Code  Activity level - Baseline/Home:  Independent. Activity Level - Current:   Assist X 2. Lift room needed: No. Bariatric: No   [place] [place]: No. Infection: Not Applicable.     Vital Signs:   Vitals:    04/17/22 0949 04/17/22 0950   BP: 132/81 134/76   Pulse: 82 86   Resp: 18 24   Temp: 98.4  F (36.9  C)    TempSrc: Tympanic    SpO2: 96% 96%   Weight: 52.2 kg (115 lb)        Cardiac Rhythm:  ,      Pain level:    Patient confused: Yes. Patient Falls Risk: Yes.   Elimination Status: Straight cath then purewick in place   Patient Report - Initial Complaint: decreased appetite, weakness. Focused Assessment: See Spring is a 82 year old female with history of prediabetes, hypertension, GERD, and hypercholesterolemia who presents via ambulance with weakness. For the past day and a half the patient began has had increased weakness, decreased mobility, and has stopped eating. Her family also notes she has been nauseous and feeling warm to the touch, but never checked her temperature. Her daughter denies any vomiting, diarrhea, or change in urine. The patient is at baseline neurologically, but is refusing to talk to me on my exam.   Tests Performed: labs, imaging. Abnormal Results:   XR Chest 1 View   Preliminary Result   IMPRESSION: Single AP view of the chest was obtained. Enlarged cardiac silhouette and mild pulmonary vascular congestion. Mild bibasilar pulmonary opacities, likely atelectasis. No significant pleural effusion or pneumothorax.         Head CT w/o contrast   Final Result   IMPRESSION:   1.  No acute intracranial hemorrhage.   2.  Moderately severe  cerebral small vessel disease. Chronic right cerebellar infarction.   3.  Brain atrophy.      Abd/pelvis CT no contrast - Stone Protocol   Final Result   IMPRESSION:    1.  No abnormalities are noted to explain nausea.   2.  Incidental note is made of a low dense lobulated, presumed 2.5 x 1.8 x 1.3 cm cyst in the right lower quadrant as noted above. This is quite superior to reflect a right adnexal lesion and in retrospect there is a suggestion of a tiny lesion in this    location 11 years earlier. Could this reflect an incidental mesenteric cyst, slowly increased in size? It has  a relatively benign finding and either further evaluation or follow-up can be done as clinically indicated.   3.  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 Findings are stable.   4.  Mild colonic diverticulosis without evidence of diverticulitis.   5.  Mild postcholecystectomy distention of the biliary tree.   6.  Removal of the previously noted biliary catheter.           Labs Ordered and Resulted from Time of ED Arrival to Time of ED Departure   ISTAT GASES LACTATE VENOUS POCT - Abnormal       Result Value    Lactic Acid POCT 0.6      Bicarbonate Venous POCT 35 (*)     O2 Sat, Venous POCT 90 (*)     pCO2V Venous POCT 41      pH Venous POCT 7.53 (*)     pO2 Venous POCT 52 (*)    COMPREHENSIVE METABOLIC PANEL - Abnormal    Sodium 139      Potassium 2.7 (*)     Chloride 101      Carbon Dioxide (CO2) 30      Anion Gap 8      Urea Nitrogen 12      Creatinine 0.93      Calcium 8.2 (*)     Glucose 106 (*)     Alkaline Phosphatase 101      AST 27      ALT 45      Protein Total 7.0      Albumin 3.5      Bilirubin Total 1.1      GFR Estimate 61     ROUTINE UA WITH MICROSCOPIC REFLEX TO CULTURE - Abnormal    Color Urine Light Yellow      Appearance Urine Clear      Glucose Urine Negative      Bilirubin Urine Negative   needs: Medical: Not on file        Non-medical: Not on file    Tobacco Use      Smoking status: Never Smoker      Smokeless tobacco: Never Used    Substance and Sexual Activity      Alcohol use:  Yes        Alcohol/week: 0.0 standard drinks        Co     Ketones Urine 10  (*)     Specific Gravity Urine 1.012      Blood Urine Negative      pH Urine 6.0      Protein Albumin Urine 30  (*)     Urobilinogen Urine Normal      Nitrite Urine Negative      Leukocyte Esterase Urine Negative      Mucus Urine Present (*)     RBC Urine <1      WBC Urine <1     MAGNESIUM - Abnormal    Magnesium 2.4 (*)    LACTIC ACID WHOLE BLOOD - Normal    Lactic Acid 0.8     LIPASE - Normal    Lipase 128     INFLUENZA A/B & SARS-COV2 PCR MULTIPLEX - Normal    Influenza A PCR Negative      Influenza B PCR Negative      RSV PCR Negative      SARS CoV2 PCR Negative     CBC WITH PLATELETS AND DIFFERENTIAL    WBC Count 5.8      RBC Count 4.18      Hemoglobin 12.9      Hematocrit 38.8      MCV 93      MCH 30.9      MCHC 33.2      RDW 13.5      Platelet Count 252      % Neutrophils 70      % Lymphocytes 19      % Monocytes 9      % Eosinophils 1      % Basophils 1      % Immature Granulocytes 0      NRBCs per 100 WBC 0      Absolute Neutrophils 4.1      Absolute Lymphocytes 1.1      Absolute Monocytes 0.5      Absolute Eosinophils 0.1      Absolute Basophils 0.0      Absolute Immature Granulocytes 0.0      Absolute NRBCs 0.0     .   Treatments provided: potassium IV, IV fluids  Family Comments: Daughter in room  OBS brochure/video discussed/provided to patient:  Yes  ED Medications:   Medications   potassium chloride 10 mEq in 100 mL sterile water intermittent infusion (premix) (10 mEq Intravenous New Bag 4/17/22 1210)   potassium chloride ER (KLOR-CON M) CR tablet 40 mEq (40 mEq Oral Not Given 4/17/22 1224)   0.9% sodium chloride BOLUS (1,000 mLs Intravenous New Bag 4/17/22 1043)     Drips infusing:  Yes  For the majority of the shift, the patient's behavior Green. Interventions performed were NA.    Sepsis treatment initiated: No     Patient tested for COVID 19 prior to admission: YES    ED Nurse Name/Phone Number: Birdie Skaggs RN,   12:26 PM      RECEIVING UNIT ED HANDOFF REVIEW    Above  or uterine or breast.  Was a smoker. • Cancer Maternal Uncle 60        lung cancer / Heavy smoker. Dx and  in his late 63's and early 66's. • Cancer Maternal Uncle 79        pancreatic cancer / Diagnosed in his 66's.   Smoked remotely for short shonda ED Nurse Handoff Report was reviewed: Yes  Reviewed by: Brenda Zamarripa, RN on April 17, 2022 at 1:30 PM        depression or anxiety, thoughts of harming self or others  Heme/Lymph:    denies easy bruising or bleeding      PHYSICAL EXAM:   Blood pressure (!) 133/94, pulse 101, weight 225 lb 3.2 oz (102.2 kg), not currently breastfeeding.   Constitutional:  well deve

## 2022-04-17 NOTE — PROGRESS NOTES
PRIMARY DIAGNOSIS: GENERALIZED WEAKNESS    OUTPATIENT/OBSERVATION GOALS TO BE MET BEFORE DISCHARGE  1. Orthostatic performed: No    2. Tolerating PO medications: No   Patient refused  3. Return to near baseline physical activity: Yes    4. Cleared for discharge by consultants (if involved): No  PT, OT and social word consult   Discharge Planner Nurse   Safe discharge environment identified: No  Barriers to discharge: Yes       Entered by: Brenda Zamarripa 04/17/2022 5:39 PM     Please review provider order for any additional goals.   Nurse to notify provider when observation goals have been met and patient is ready for discharge.    VSS, Patient is one staff assist, Declined interpretor daughter at bedside interpereting. PT,OT and social wok consult.Refused to eat and drink. Daughter reported patient mood changes frequently between happy and verbal aggression. Patient occasionally refuse to engage in conversation. LR running at 100ml/hr. Patient has IV potassium running for low K+ of 2.7.

## 2022-04-17 NOTE — PROGRESS NOTES
ROOM # 227    Living Situation (if not independent, order SW consult): Ind with daughter  Facility name: none   : [facility]    Activity level at baseline: Ind with walker  Activity level on admit: x1    Who will be transporting you at discharge: [facility]    Patient registered to observation; given Patient Bill of Rights; given the opportunity to ask questions about observation status and their plan of care.  Patient has been oriented to the observation room, bathroom and call light is in place.    Discussed discharge goals and expectations with patient/family.

## 2022-04-18 ENCOUNTER — APPOINTMENT (OUTPATIENT)
Dept: PHYSICAL THERAPY | Facility: CLINIC | Age: 82
End: 2022-04-18
Attending: HOSPITALIST
Payer: COMMERCIAL

## 2022-04-18 VITALS
HEART RATE: 73 BPM | WEIGHT: 115 LBS | BODY MASS INDEX: 22.46 KG/M2 | OXYGEN SATURATION: 96 % | SYSTOLIC BLOOD PRESSURE: 114 MMHG | RESPIRATION RATE: 16 BRPM | TEMPERATURE: 97.5 F | DIASTOLIC BLOOD PRESSURE: 77 MMHG

## 2022-04-18 LAB
ANION GAP SERPL CALCULATED.3IONS-SCNC: 5 MMOL/L (ref 3–14)
ATRIAL RATE - MUSE: 87 BPM
BUN SERPL-MCNC: 7 MG/DL (ref 7–30)
CALCIUM SERPL-MCNC: 8.1 MG/DL (ref 8.5–10.1)
CHLORIDE BLD-SCNC: 106 MMOL/L (ref 94–109)
CO2 SERPL-SCNC: 29 MMOL/L (ref 20–32)
CREAT SERPL-MCNC: 0.75 MG/DL (ref 0.52–1.04)
DIASTOLIC BLOOD PRESSURE - MUSE: NORMAL MMHG
ERYTHROCYTE [DISTWIDTH] IN BLOOD BY AUTOMATED COUNT: 13.6 % (ref 10–15)
GFR SERPL CREATININE-BSD FRML MDRD: 79 ML/MIN/1.73M2
GLUCOSE BLD-MCNC: 97 MG/DL (ref 70–99)
HCT VFR BLD AUTO: 39.2 % (ref 35–47)
HGB BLD-MCNC: 12.7 G/DL (ref 11.7–15.7)
INTERPRETATION ECG - MUSE: NORMAL
MAGNESIUM SERPL-MCNC: 2 MG/DL (ref 1.6–2.3)
MCH RBC QN AUTO: 30.4 PG (ref 26.5–33)
MCHC RBC AUTO-ENTMCNC: 32.4 G/DL (ref 31.5–36.5)
MCV RBC AUTO: 94 FL (ref 78–100)
P AXIS - MUSE: 59 DEGREES
PHOSPHATE SERPL-MCNC: 2.4 MG/DL (ref 2.5–4.5)
PLATELET # BLD AUTO: 221 10E3/UL (ref 150–450)
POTASSIUM BLD-SCNC: 3.1 MMOL/L (ref 3.4–5.3)
POTASSIUM BLD-SCNC: 3.3 MMOL/L (ref 3.4–5.3)
PR INTERVAL - MUSE: 186 MS
QRS DURATION - MUSE: 94 MS
QT - MUSE: 474 MS
QTC - MUSE: 570 MS
R AXIS - MUSE: -18 DEGREES
RBC # BLD AUTO: 4.18 10E6/UL (ref 3.8–5.2)
SODIUM SERPL-SCNC: 140 MMOL/L (ref 133–144)
SYSTOLIC BLOOD PRESSURE - MUSE: NORMAL MMHG
T AXIS - MUSE: 25 DEGREES
VENTRICULAR RATE- MUSE: 87 BPM
WBC # BLD AUTO: 6.1 10E3/UL (ref 4–11)

## 2022-04-18 PROCEDURE — 36415 COLL VENOUS BLD VENIPUNCTURE: CPT | Performed by: HOSPITALIST

## 2022-04-18 PROCEDURE — G0378 HOSPITAL OBSERVATION PER HR: HCPCS

## 2022-04-18 PROCEDURE — 250N000013 HC RX MED GY IP 250 OP 250 PS 637: Performed by: HOSPITALIST

## 2022-04-18 PROCEDURE — 83735 ASSAY OF MAGNESIUM: CPT | Performed by: HOSPITALIST

## 2022-04-18 PROCEDURE — 80048 BASIC METABOLIC PNL TOTAL CA: CPT | Performed by: HOSPITALIST

## 2022-04-18 PROCEDURE — 84100 ASSAY OF PHOSPHORUS: CPT | Performed by: HOSPITALIST

## 2022-04-18 PROCEDURE — 258N000003 HC RX IP 258 OP 636: Performed by: HOSPITALIST

## 2022-04-18 PROCEDURE — 97161 PT EVAL LOW COMPLEX 20 MIN: CPT | Mod: GP | Performed by: PHYSICAL THERAPIST

## 2022-04-18 PROCEDURE — 85027 COMPLETE CBC AUTOMATED: CPT | Performed by: HOSPITALIST

## 2022-04-18 PROCEDURE — 97116 GAIT TRAINING THERAPY: CPT | Mod: GP | Performed by: PHYSICAL THERAPIST

## 2022-04-18 PROCEDURE — 97530 THERAPEUTIC ACTIVITIES: CPT | Mod: GP | Performed by: PHYSICAL THERAPIST

## 2022-04-18 PROCEDURE — 93010 ELECTROCARDIOGRAM REPORT: CPT | Performed by: INTERNAL MEDICINE

## 2022-04-18 PROCEDURE — 93005 ELECTROCARDIOGRAM TRACING: CPT

## 2022-04-18 PROCEDURE — 99217 PR OBSERVATION CARE DISCHARGE: CPT | Performed by: HOSPITALIST

## 2022-04-18 PROCEDURE — 84132 ASSAY OF SERUM POTASSIUM: CPT | Performed by: HOSPITALIST

## 2022-04-18 RX ORDER — POTASSIUM CHLORIDE 1500 MG/1
20 TABLET, EXTENDED RELEASE ORAL ONCE
Status: COMPLETED | OUTPATIENT
Start: 2022-04-18 | End: 2022-04-18

## 2022-04-18 RX ORDER — CALCIUM CARBONATE 500 MG/1
1 TABLET, CHEWABLE ORAL 2 TIMES DAILY
Qty: 60 TABLET | Refills: 0 | Status: SHIPPED | OUTPATIENT
Start: 2022-04-18 | End: 2022-05-18

## 2022-04-18 RX ADMIN — LISINOPRIL 10 MG: 10 TABLET ORAL at 08:42

## 2022-04-18 RX ADMIN — Medication 125 MCG: at 08:42

## 2022-04-18 RX ADMIN — AMLODIPINE BESYLATE 5 MG: 5 TABLET ORAL at 08:42

## 2022-04-18 RX ADMIN — DULOXETINE HYDROCHLORIDE 60 MG: 60 CAPSULE, DELAYED RELEASE ORAL at 08:42

## 2022-04-18 RX ADMIN — ATORVASTATIN CALCIUM 10 MG: 10 TABLET, FILM COATED ORAL at 08:42

## 2022-04-18 RX ADMIN — POTASSIUM & SODIUM PHOSPHATES POWDER PACK 280-160-250 MG 1 PACKET: 280-160-250 PACK at 08:43

## 2022-04-18 RX ADMIN — FUROSEMIDE 20 MG: 20 TABLET ORAL at 08:42

## 2022-04-18 RX ADMIN — SODIUM CHLORIDE, POTASSIUM CHLORIDE, SODIUM LACTATE AND CALCIUM CHLORIDE: 600; 310; 30; 20 INJECTION, SOLUTION INTRAVENOUS at 09:38

## 2022-04-18 RX ADMIN — POTASSIUM CHLORIDE 20 MEQ: 1500 TABLET, EXTENDED RELEASE ORAL at 08:42

## 2022-04-18 NOTE — DISCHARGE SUMMARY
[facility]    Discharge Summary  Hospitalist    Date of Admission:  4/17/2022  Date of Discharge:  4/18/2022  Provider:  Rudy Cochran MD  Date of Service (when I last saw the patient): 04/18/22    Discharge Diagnoses   1.  Weakness secondary to poor oral intake, abnormal electrolytes.      2.  Essential hypertension.  3.  Hyperlipidemia.  4.  GERD.  5.  Depression on duloxetine.  6.  History of right hemisphere stroke.     7.  Hypovitaminosis D.   8.  Incidental findings on CT of the head and CT abdomen pelvis noted at the bottom of this summary     Other medical issues:  No past medical history on file.    History of Present Illness   See Spring is an 82 year old female who presented with weakness.  Please see the admission history and physical for full details.    Hospital Course   See Spring is a 82 year old female who presents with weakness after several days refusing oral intake and not talking to the family.  No fever or any other acute symptoms have been documented.  In the emergency department she has been nontoxic with hypokalemia, hypocalcemia and hypomagnesemia.  Her EKG shows a long QTi.  There are many incidental findings in the MRI of the abdomen and pelvis as well on CT of the head, no new findings, stable.  She is nonfocal on talk to her daughter occasionally but she does not talk to the medical staff taking care of her in the emergency department or this writer.     1.  Weakness, poor oral intake, abnormal electrolytes.  This seems to be a main contributor to her presentation, however the cause why she is not eating and drinking properly is hard to decipher given the language barrier and cultural values.  Her daughter reports that they are has 11 siblings and she is the only one taking care of her mother.  She would like  involvement for help.  Uneventful stay, rehydrated and electrolytes replaced per protocol.   - Observation  -Electrolyte correction per  protocol  -Hydration with Ringer lactate 100 mL/h.  -Physical therapy consult Occupational Therapy assessment and treatment.  - consultated.     2.  Essential hypertension.  -Hold treatment with amlodipine, furosemide and lisinopril.  3.  Hyperlipidemia.  -On treatment with atorvastatin.  4.  GERD.  5.  Depression on duloxetine.  6.  History of right hemisphere stroke.  Aspirin 325 mg daily  7.  Hypovitaminosis D.?  She  takes vitamin D supplement.   # Discharge Pain Plan:    - Patient currently has NO PAIN and is not being prescribed pain medications on discharge.      Significant Results and Procedures   See below    Pending Results       Unresulted Labs Ordered in the Past 30 Days of this Admission     No orders found for last 31 day(s).          Code Status   Full Code       Primary Care Physician   Physician No Ref-Primary    GEN:  Alert, non cooperative to interview, appears comfortable, NAD.  HEENT:  Normocephalic/atraumatic, no scleral icterus, no nasal discharge, mouth moist.  CV:  Regular rate and rhythm, no murmur or JVD.  S1 + S2 noted, no S3 or S4.  LUNGS:  Clear to auscultation bilaterally without rales/rhonchi/wheezing/retractions.  Symmetric chest rise on inhalation noted.  ABD:  Active bowel sounds, soft, non-tender/non-distended.  No rebound/guarding/rigidity.  EXT:  No edema or cyanosis.  No joint synovitis noted.  SKIN:  Dry to touch, no exanthems noted in the visualized areas.     Discharge Disposition   Discharged to home    Consultations This Hospital Stay   SOCIAL WORK IP CONSULT  PHYSICAL THERAPY ADULT IP CONSULT  OCCUPATIONAL THERAPY ADULT IP CONSULT  CARE MANAGEMENT / SOCIAL WORK IP CONSULT    Time Spent on this Encounter   I, Rudy Cochran MD, personally saw the patient today and spent greater than 30 minutes discharging this patient.     Discharge Orders   No discharge procedures on file.  Discharge Medications   Current Discharge Medication List      START taking these  medications    Details   calcium carbonate (TUMS) 500 MG chewable tablet Take 1 tablet (500 mg) by mouth 2 times daily  Qty: 60 tablet, Refills: 0    Associated Diagnoses: Generalized muscle weakness; Failure to thrive in adult      potassium & sodium phosphates (NEUTRA-PHOS) 280-160-250 MG Packet Take 1 packet by mouth 3 times daily  Qty: 90 packet, Refills: 0    Associated Diagnoses: Hypokalemia         CONTINUE these medications which have NOT CHANGED    Details   acetaminophen-codeine (TYLENOL #3) 300-30 MG tablet Take 1 tablet by mouth every 6 hours as needed for severe pain      amLODIPine (NORVASC) 5 MG tablet Take 5 mg by mouth 2 times daily      aspirin (ASA) 325 MG EC tablet Take 1 tablet (325 mg) by mouth every 6 hours as needed for moderate pain  Qty: 30 tablet, Refills: 0      atorvastatin (LIPITOR) 10 MG tablet Take 10 mg by mouth daily      azelastine (OPTIVAR) 0.05 % ophthalmic solution Instill 1 drop to affected eye twice daily as needed for itchy/watery eyes      cholecalciferol (VITAMIN D3) 125 mcg (5000 units) capsule Take 125 mcg by mouth daily      DULoxetine (CYMBALTA) 60 MG capsule Take 60 mg by mouth daily      furosemide (LASIX) 20 MG tablet Take 20 mg by mouth daily      lisinopril (ZESTRIL) 10 MG tablet Take 1 tablet (10 mg) by mouth daily  Qty: 20 tablet, Refills: 0           Allergies   Allergies   Allergen Reactions     Gabapentin Unknown     Data   Most Recent 3 CBC's:Recent Labs   Lab Test 04/18/22  0627 04/17/22  0945 06/24/21  1910   WBC 6.1 5.8 7.5   HGB 12.7 12.9 13.4   MCV 94 93 96    252 198      Most Recent 3 BMP's:  Recent Labs   Lab Test 04/18/22  0627 04/17/22  1457 04/17/22  0947 06/25/21  1559     --  139 139   POTASSIUM 3.1* 2.7* 2.7* 4.0   CHLORIDE 106  --  101 102   CO2 29  --  30 21*   BUN 7  --  12 8   CR 0.75  --  0.93 0.65   ANIONGAP 5  --  8 16   SHIRA 8.1*  --  8.2* 9.4   GLC 97  --  106* 255*     Most Recent 2 LFT's:  Recent Labs   Lab Test  04/17/22  0947 02/22/21  1648   AST 27 23   ALT 45 29   ALKPHOS 101 98   BILITOTAL 1.1 1.1*     Most Recent INR's and Anticoagulation Dosing History:  Anticoagulation Dose History     Recent Dosing and Labs Latest Ref Rng & Units 6/24/2021    INR 0.86 - 1.14 0.90        Most Recent 3 Troponin's:  Recent Labs   Lab Test 06/24/21  1910   TROPI 0.015     Most Recent Cholesterol Panel:  Recent Labs   Lab Test 06/25/21  1559   CHOL 172   LDL 76   HDL 44*   TRIG 262*     Most Recent 6 Bacteria Isolates From Any Culture (See EPIC Reports for Culture Details):No lab results found.  Most Recent TSH, T4 and A1c Labs:  Recent Labs   Lab Test 04/17/22  0945   A1C 5.5     Results for orders placed or performed during the hospital encounter of 04/17/22   Head CT w/o contrast    Narrative    EXAM: CT HEAD W/O CONTRAST  LOCATION: [facility]  DATE/TIME: 4/17/2022 10:52 AM    INDICATION: AMS  COMPARISON: 06/24/2021  TECHNIQUE: Routine CT Head without IV contrast. Multiplanar reformats. Dose reduction techniques were used.    FINDINGS:  INTRACRANIAL CONTENTS: No intracranial hemorrhage, extraaxial collection, or mass effect.  No CT evidence of acute infarct. Chronic right basal ganglia and left thalamic lacunar infarctions. Chronic right cerebellar infarction. Moderate/severe cerebral   small vessel disease. Mild to moderate generalized volume loss. No hydrocephalus.     VISUALIZED ORBITS/SINUSES/MASTOIDS: Prior bilateral cataract surgery. Visualized portions of the orbits are otherwise unremarkable. No paranasal sinus mucosal disease. No middle ear or mastoid effusion.    BONES/SOFT TISSUES: No acute abnormality.      Impression    IMPRESSION:  1.  No acute intracranial hemorrhage.  2.  Moderately severe cerebral small vessel disease. Chronic right cerebellar infarction.  3.  Brain atrophy.   XR Chest 1 View    Narrative    EXAM: XR CHEST 1 VIEW  LOCATION: [facility]  DATE/TIME:  04/17/2022, 11:03 AM    INDICATION: Cough.  COMPARISON: Chest x-ray on 05/31/2011. CT abdomen and pelvis on same day earlier.      Impression    IMPRESSION: Single AP view of the chest was obtained. Enlarged cardiac silhouette and mild pulmonary vascular congestion. Mild bibasilar pulmonary opacities, likely atelectasis. No significant pleural effusion or pneumothorax.     Abd/pelvis CT no contrast - Stone Protocol    Narrative    EXAM: CT ABDOMEN PELVIS W/O CONTRAST  LOCATION: [facility]  DATE/TIME: 4/17/2022 10:52 AM    INDICATION: nausea, not eating, hypokalemia  COMPARISON: 05/31/2011  TECHNIQUE: CT scan of the abdomen and pelvis was performed without IV contrast. Multiplanar reformats were obtained. Dose reduction techniques were used.  CONTRAST: None.    FINDINGS: Mild respiratory artifact.    LOWER CHEST: Multichamber cardiac enlargement. Mild subpleural atelectasis in the right base.    HEPATOBILIARY: Post cholecystectomy distention of the biliary tree. No intraductal stones. Removal of the previously noted the biliary catheter.    PANCREAS: Normal.    SPLEEN: Normal.    ADRENAL GLANDS: Normal.    KIDNEYS/BLADDER: Tiny cortical cyst seen projecting from the left kidney which need no follow-up.    BOWEL: There is a 2.5 x 1.8 x 1.3 cm low dense, lobulated mass in the right lower quadrant anterior to the right psoas muscle. This has a density of 12 Hounsfield units. In retrospect (prior study image 96), there is a suggestion of a tiny low dense   lesion in this area. No free fluid or inflammatory changes. Mild distal colonic diverticulosis.     LYMPH NODES: Normal.    VASCULATURE: Extensive atherosclerotic vascular disease. Ectatic abdominal aorta which is mildly aneurysmal at 3 cm. There is a distal right common iliac artery measures 2.2 cm, unchanged. Left common iliac artery is ectatic.    PELVIC ORGANS: Normal.    MUSCULOSKELETAL: No suspicious lesions.      Impression    IMPRESSION:    1.  No abnormalities are noted to explain nausea.  2.  Incidental note is made of a low dense lobulated, presumed 2.5 x 1.8 x 1.3 cm cyst in the right lower quadrant as noted above. This is quite superior to reflect a right adnexal lesion and in retrospect there is a suggestion of a tiny lesion in this   location 11 years earlier. Could this reflect an incidental mesenteric cyst, slowly increased in size? It has  a relatively benign finding and either further evaluation or follow-up can be done as clinically indicated.  3.  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 Findings are stable.  4.  Mild colonic diverticulosis without evidence of diverticulitis.  5.  Mild postcholecystectomy distention of the biliary tree.  6.  Removal of the previously noted biliary catheter.       Disclaimer: This note consists of symbols derived from keyboarding, dictation and/or voice recognition software. As a result, there may be errors in the script that have gone undetected. Please consider this when interpreting information found in this chart.

## 2022-04-18 NOTE — PLAN OF CARE
Occupational Therapy: Orders received. Chart reviewed and discussed with care team.? Occupational Therapy not indicated due to pt near baseline, no IP OT needs.  Recommendation is for discharge home with home OT/PT.  Pt may also move to an BLAS.? Defer discharge recommendations to treatment team.? Will complete orders.

## 2022-04-18 NOTE — PLAN OF CARE
Patient's After Visit Summary was reviewed with patient and/or daughter.   Patient verbalized understanding of After Visit Summary, recommended follow up and was given an opportunity to ask questions.   Discharge medications sent home with patient/family: No- to be picked up at preferred pharmacy  Discharged with daughter to home    VSS. IV removed. Tele removed. Pt dressed and belongings gather. Discharging home with daughter.

## 2022-04-18 NOTE — PROGRESS NOTES
04/18/22 1000   Quick Adds   Type of Visit Initial PT Evaluation   Living Environment   People in Home child(aleida), adult   Current Living Arrangements house   Home Accessibility stairs to enter home;stairs within home   Number of Stairs, Main Entrance 6   Stair Railings, Main Entrance railing on right side (ascending)   Number of Stairs, Within Home, Primary five   Stair Railings, Within Home, Primary railing on right side (ascending)   Transportation Anticipated family or friend will provide   Living Environment Comments Pt lives with her dtr and grandchildren in 2 level home with ~ 6 SAUMYA and ~ 5 steps up to main living area with R rail on both stairs.   Self-Care   Usual Activity Tolerance moderate   Current Activity Tolerance moderate   Equipment Currently Used at Home cane, straight;walker, rolling;shower chair   Fall history within last six months no   Activity/Exercise/Self-Care Comment Dtr acting as  and states that she and her 19 yr old dtr provide A with all bed mobility and ADLs.   Pt needs A for dressing, toileting and bathing. Pt has a walk in shower with a shower chair and HHspray.   General Information   Onset of Illness/Injury or Date of Surgery 04/17/22   Referring Physician Rudy Cochran   Patient/Family Therapy Goals Statement (PT) Pt's daughter would like pt to DC to an AL.   Pertinent History of Current Problem (include personal factors and/or comorbidities that impact the POC) See Sprnig is a 82 year old female who presents with weakness after several days refusing oral intake and not talking to the family.  No fever or any other acute symptoms have been documented.  In the emergency department she has been nontoxic with hypokalemia, hypocalcemia and hypomagnesemia.  Her EKG shows a long QTi.  There are many incidental findings in the MRI of the abdomen and pelvis as well on CT of the head,  Head CT showed moderately severe cerebral small vessel disease. Chronic right cerebellar  infarction and brain atrophy.   Existing Precautions/Restrictions fall   General Observations Pt lying in bed with dtr present   Cognition   Cognitive Status Comments Dtr states that pt is oriented to person and place.  Dtr also states  that pt wants the family to do everything for her and that she can be verbally abusive to the dtr and grandchildren.   Pain Assessment   Patient Currently in Pain   (Dtr states pt's only pain is at IV site in R forearm)   Integumentary/Edema   Integumentary/Edema no deficits were identifed   Posture    Posture Forward head position;Protracted shoulders   Range of Motion (ROM)   ROM Comment No deficits identified with mobility   Strength (Manual Muscle Testing)   Strength (Manual Muscle Testing) Deficits observed during functional mobility   Strength Comments Decreased UE, LE and core strength observed with mobility. Pt needing Min A at trunk to go from sup > sit and close SBA/CGA with transfers and gait for safety   Bed Mobility   Bed Mobility supine-sit   Supine-Sit [place] (Bed Mobility) minimum assist (75% patient effort);verbal cues   Comment, (Bed Mobility) sup >  sit with Min A at trunk with pt reaching out with B UEs to pull on therapist's arms   Transfers   Transfers sit-stand transfer   Sit-Stand Transfer   Sit-Stand [place] (Transfers) verbal cues;contact guard   Assistive Device (Sit-Stand Transfers) walker, front-wheeled   Comment, (Sit-Stand Transfer) sit <> stand from bed,  toilet and recliner with CGA with pt pulling up on FWW despite VCs to push off surface.  Pt demonstrates  decreased safety when approaching a chair, leaving her FWW behind.   Gait/Stairs (Locomotion)   [place] Level (Gait) verbal cues;contact guard   Assistive Device (Gait) walker, front-wheeled   Distance in Feet (Required for LE Total Joints) 120   Comment, (Gait/Stairs) Pt amb 120' with FWW and close SBA/CGA with slow gait and short steps with decreased B feet clearance.  Dtr  occasionally pulling FWW forward for increased gait speed.   Balance   Balance Comments Pt requires B UE support on FWW and SBA for safe dynamic standing balance   Sensory Examination   Sensory Perception Comments NT   Coordination   Coordination no deficits were identified   Muscle Tone   Muscle Tone no deficits were identified   Clinical Impression   Criteria for Skilled Therapeutic Intervention Yes, treatment indicated   PT Diagnosis (PT) Decreased functional mobility   Influenced by the following impairments Generalized mms weakness and deconditioning,  impaired standing balance   Functional limitations due to impairments Assisted mobility and ADLs, increased falls risk   Clinical Presentation (PT Evaluation Complexity) Stable/Uncomplicated   Clinical Presentation Rationale Pt medically stable and close to baseline with mobility   Clinical Decision Making (Complexity) low complexity   Planned Therapy Interventions (PT) bed mobility training;gait training;patient/family education;stair training;strengthening;transfer training   Risk & Benefits of therapy have been explained evaluation/treatment results reviewed;care plan/treatment goals reviewed;risks/benefits reviewed;current/potential barriers reviewed;participants voiced agreement with care plan;participants included;patient;daughter   PT Discharge Planning   PT Discharge Recommendation (DC Rec) home with assist;home with home care physical therapy  (Daughter would like AL)   PT Rationale for DC Rec Pt appears to be close to or at baseline with all mobility, requiring Min A with sup > sit and close SBA/CGA with transfers and gait.  Pt needs 24/7 S with all mobility for safety and A due to increased falls risk.  Pt currently lives with her daughter and grandchildren, who have been providing 24/7 care for the past 2 years.  Pt has ~ 5 + 4 steps to enter home.  Daughter is feeling overwhelmed and would like pt to move into an assisted living.  If family unable to  find or pay for an AL then recommend pt return home with HHPT/OT and HHA for showering to increase independence with all mobility and ADLs and decrease burden of care on the family   PT Brief overview of current status A x 1 FWW   Total Evaluation Time   Total Evaluation Time (Minutes) 10   Physical Therapy Goals   PT Frequency 4x/week   PT Predicted Duration/Target Date for Goal Attainment 04/20/22   PT Goals Bed Mobility;Transfers;Gait;Stairs   PT: Bed Mobility Supervision/stand-by assist;Supine to/from sit   PT: Transfers Supervision/stand-by assist;Sit to/from stand;Assistive device  (with safe approach to chair with FWW and safe hand placement)   PT: Gait Supervision/stand-by assist;Rolling walker;150 feet   PT: Stairs Minimal assist;10 stairs;Rail on right

## 2022-04-18 NOTE — CONSULTS
Care Management Initial Consult    General Information  Assessment completed with: Patient, Children, See, daughter Tawny  Type of CM/SW Visit: Offer D/C Planning    Primary Care Provider verified and updated as needed: Yes   Readmission within the last 30 days:      Reason for Consult: discharge planning  Advance Care Planning: Advance Care Planning Reviewed: no concerns identified        Communication Assessment  Patient's communication style: spoken language (English or Bilingual)    Hearing Difficulty or Deaf: yes      Cognitive  Cognitive/Neuro/Behavioral: .WDL except  Level of Consciousness: alert  Arousal Level: opens eyes spontaneously  Orientation: disoriented to, situation  Mood/Behavior: calm, cooperative  Best Language: 0 - No aphasia  Speech: clear, spontaneous    Living Environment:   People in home: child(aleida), adult, grandchild(aleida)     Current living Arrangements: house      Able to return to prior arrangements: yes       Family/Social Support:  Care provided by: child(aleida)  Provides care for: no one, unable/limited ability to care for self  Marital Status:   Children          Description of Support System: Involved, Supportive    Support Assessment: Adequate family and caregiver support, Adequate social supports    Current Resources:   Patient receiving home care services: Yes  Community Resources: PCA  Equipment currently used at home: walker, standard    Employment/Financial:  Employment Status: retired     Financial Concerns: insurance, none   Referral to Financial Worker: No     Lifestyle & Psychosocial Needs:  Social Determinants of Health     Tobacco Use: Not on file   Alcohol Use: Not on file   Financial Resource Strain: Not on file   Food Insecurity: Not on file   Transportation Needs: Not on file   Physical Activity: Not on file   Stress: Not on file   Social Connections: Not on file   Intimate Partner Violence: Not on file   Depression: Not on file   Housing Stability: Not on file      Functional Status:  Prior to admission patient needed assistance: Patient consulted for discharge planning.      SW met with patient and daughter Tawny via iPad . Pt is hard of hearing and had some difficulty hearing . Completed assessment primarily with daughter.    Daughter reports that patient lives with daughter and her kids. Pt's granddaughter is her PCA. Patient is eligible for 7 hours of PCA/day. Daughter reports that patient has a [facility] with [facility].     PT evaluated patient and recommended home with family/home care.     Daughter/family are wanting patient to go to [facility]. Dtr asked that SW ask patient directly and try to talk her into it. At this time, patient is not agreeable to a different living setting, and is adamant about returning home. Daughter reports that they can meet her needs at home currently, but does not feel like they can continue to care for her in their home long term. SW advised that they reach out to [facility] when patients needs increase to see about increased PCA services, DME, etc. With her waiver. Dtr requested that SW ask patient not to curse her family in their ong culture if they decide that they need her to move into a facility. Patient did not reply to conversation and continued to repeat that she is hard of hearing but she is going home.     Mental Health Status:  Mental Health Status: No Current Concerns       Chemical Dependency Status:  Chemical Dependency Status: No Current Concerns           Values/Beliefs:  Spiritual, Cultural Beliefs, Hinduism Practices, Values that affect care: yes             Additional Information:  Plan: Home today with continuation of PCA services. Daughter not interested in additional Home PT services. Daughter to transport. SW will remain available for any further needs.     WILLIAN Miller

## 2022-04-18 NOTE — PLAN OF CARE
PRIMARY DIAGNOSIS: GENERALIZED WEAKNESS    OUTPATIENT/OBSERVATION GOALS TO BE MET BEFORE DISCHARGE  1. Orthostatic performed: N/A    2. Tolerating PO medications: Yes    3. Return to near baseline physical activity: No    4. Cleared for discharge by consultants (if involved): No    Discharge Planner Nurse   Safe discharge environment identified: Yes  Barriers to discharge: Yes       Entered by: Amber Nunes 04/17/2022 11:22 PM     Please review provider order for any additional goals.   Nurse to notify provider when observation goals have been met and patient is ready for discharge.

## 2022-04-18 NOTE — PLAN OF CARE
PRIMARY DIAGNOSIS: GENERALIZED WEAKNESS    OUTPATIENT/OBSERVATION GOALS TO BE MET BEFORE DISCHARGE  1. Orthostatic performed: N/A    2. Tolerating PO medications: Yes    3. Return to near baseline physical activity: No    4. Cleared for discharge by consultants (if involved): No    Vitals are Temp: 98.6  F (37  C) Temp src: Temporal BP: (!) 150/89 Pulse: 94   Resp: 16 SpO2: 95 %.    Patient is Disorientated to, Time and Situation. Denies any pain with interview. Patient has LR infusing. Pt is a Regular diet. She is 1 Assist with Gait Belt and Walker. Plan is PT. OT and Social work consult.     Discharge Planner Nurse   Safe discharge environment identified: Yes  Barriers to discharge: Yes       Entered by: Amber Nunes 04/18/2022 4:31 AM     Please review provider order for any additional goals.   Nurse to notify provider when observation goals have been met and patient is ready for discharge.

## 2022-04-18 NOTE — PLAN OF CARE
PRIMARY DIAGNOSIS: GENERALIZED WEAKNESS    OUTPATIENT/OBSERVATION GOALS TO BE MET BEFORE DISCHARGE  1. Orthostatic performed: N/A    2. Tolerating PO medications: Yes    3. Return to near baseline physical activity: No    4. Cleared for discharge by consultants (if involved): No    Vitals are Temp: 98.6  F (37  C) Temp src: Temporal BP: (!) 150/89 Pulse: 94   Resp: 16 SpO2: 95 %.    Patient is Disorientated to, Time and Situation. Denies any pain with interview. Ate cold box meal  Tray 100% of her meal. Patient IV was replaced. Potassium infusion completed at 0100. Explained to daughter will need re-check blood. She reported patient will refuse if her sleep is interaptedted if it can just be done in the morning. Lab draw scheduled for Am. For Potassium re-check. Patient is Saline locked. Pt is a Regular diet. She is  are 1 Assist with Gait Belt and Walker. Will continue to monitor.     Discharge Planner Nurse   Safe discharge environment identified: Yes  Barriers to discharge: Yes       Entered by: Amber Nunes 04/18/2022 2:36 AM     Please review provider order for any additional goals.   Nurse to notify provider when observation goals have been met and patient is ready for discharge.

## 2022-04-18 NOTE — PLAN OF CARE
Physical Therapy Discharge Summary    Reason for therapy discharge:    Discharged to home.    Progress towards therapy goal(s). See goals on Care Plan in [facility] electronic health record for goal details.  Goals not met.  Barriers to achieving goals:   discharge on same date as initial evaluation.    Therapy recommendation(s):    Continued therapy is recommended.  Rationale/Recommendations:   .  Recommend  continued skilled HHPT to increase safety and independence with all mobility and ADLs to decrease burden of care on family; however, perr HEAVEN note, family declining HHPT  Goal Outcome Evaluation:

## 2022-04-19 ENCOUNTER — PATIENT OUTREACH (OUTPATIENT)
Dept: CARE COORDINATION | Facility: CLINIC | Age: 82
End: 2022-04-19
Payer: MEDICARE

## 2022-04-19 ENCOUNTER — LAB REQUISITION (OUTPATIENT)
Dept: LAB | Facility: CLINIC | Age: 82
End: 2022-04-19

## 2022-04-19 DIAGNOSIS — R63.39 OTHER FEEDING DIFFICULTIES: ICD-10-CM

## 2022-04-19 DIAGNOSIS — Z71.89 OTHER SPECIFIED COUNSELING: ICD-10-CM

## 2022-04-19 LAB
ANION GAP SERPL CALCULATED.3IONS-SCNC: 12 MMOL/L (ref 5–18)
ATRIAL RATE - MUSE: 87 BPM
BUN SERPL-MCNC: 7 MG/DL (ref 8–28)
CALCIUM SERPL-MCNC: 8.7 MG/DL (ref 8.5–10.5)
CHLORIDE BLD-SCNC: 101 MMOL/L (ref 98–107)
CO2 SERPL-SCNC: 28 MMOL/L (ref 22–31)
CREAT SERPL-MCNC: 0.82 MG/DL (ref 0.6–1.1)
DIASTOLIC BLOOD PRESSURE - MUSE: NORMAL MMHG
GFR SERPL CREATININE-BSD FRML MDRD: 71 ML/MIN/1.73M2
GLUCOSE BLD-MCNC: 99 MG/DL (ref 70–125)
INTERPRETATION ECG - MUSE: NORMAL
MAGNESIUM SERPL-MCNC: 1.7 MG/DL (ref 1.8–2.6)
P AXIS - MUSE: 67 DEGREES
PHOSPHATE SERPL-MCNC: 3 MG/DL (ref 2.5–4.5)
POTASSIUM BLD-SCNC: 3.2 MMOL/L (ref 3.5–5)
PR INTERVAL - MUSE: 174 MS
QRS DURATION - MUSE: 90 MS
QT - MUSE: 382 MS
QTC - MUSE: 459 MS
R AXIS - MUSE: -16 DEGREES
SODIUM SERPL-SCNC: 141 MMOL/L (ref 136–145)
SYSTOLIC BLOOD PRESSURE - MUSE: NORMAL MMHG
T AXIS - MUSE: 49 DEGREES
VENTRICULAR RATE- MUSE: 87 BPM

## 2022-04-19 PROCEDURE — 83735 ASSAY OF MAGNESIUM: CPT | Performed by: PHYSICIAN ASSISTANT

## 2022-04-19 PROCEDURE — 84100 ASSAY OF PHOSPHORUS: CPT | Performed by: PHYSICIAN ASSISTANT

## 2022-04-19 PROCEDURE — 82310 ASSAY OF CALCIUM: CPT | Performed by: PHYSICIAN ASSISTANT

## 2022-04-19 NOTE — PROGRESS NOTES
[facility]      OUTPATIENT PHYSICAL THERAPY EVALUATION  PLAN OF TREATMENT FOR OUTPATIENT REHABILITATION  (COMPLETE FOR INITIAL CLAIMS ONLY)  Patient's Last Name, First Name, M.I.  YOB: 1940  Spring,See                           Provider's Name  [facility] Medical Record No.  [id number]                               Onset Date:  04/17/22   Start of Care Date:  04/18/22      Type:     _X_PT   ___OT   ___SLP Medical Diagnosis:                           PT Diagnosis:  Decreased functional mobility   Visits from SOC:  1   _________________________________________________________________________________  Plan of Treatment/Functional Goals    Planned Interventions: bed mobility training, gait training, patient/family education, stair training, strengthening, transfer training     Goals: See Physical Therapy Goals on Care Plan in [employer] electronic health record.    Therapy Frequency: 4x/week  Predicted Duration of Therapy Intervention: 04/20/22  _________________________________________________________________________________    I CERTIFY THE NEED FOR THESE SERVICES FURNISHED UNDER        THIS PLAN OF TREATMENT AND WHILE UNDER MY CARE     (Physician co-signature of this document indicates review and certification of the therapy plan).                Certification date from: 04/18/22, Certification date to: 04/20/22    Referring Physician: Rudy Cochran            Initial Assessment        See Physical Therapy evaluation dated 04/18/22 in Epic electronic health record.

## 2022-04-19 NOTE — PROGRESS NOTES
Clinic Care Coordination Contact  [facility]/Voicemail       Clinical Data: Care Coordinator Outreach  Outreach attempted x 1.  Unable to leave a message on patient's voicemail with call back information and requested return call.  Plan: Care Coordinator will try to reach patient again in 1-2 business days.    .Carmen MCCALLUM Community Health Worker  Clinic Care Coordination  [facility]  Phone: [phone]

## 2022-04-20 NOTE — PROGRESS NOTES
Clinic Care Coordination Contact  [facility]/Voicemail       Clinical Data: Care Coordinator Outreach  Outreach attempted x 2.  Unable to leave a message on patient's voicemail with call back information and requested return call.  Plan:  Care Coordinator will do no further outreaches at this time.    Carmen MCCALLUM Community Health Worker  Clinic Care Coordination  [facility]  Phone: [phone]

## 2022-04-28 ENCOUNTER — PATIENT OUTREACH (OUTPATIENT)
Dept: CARE COORDINATION | Facility: CLINIC | Age: 82
End: 2022-04-28
Payer: COMMERCIAL

## 2022-04-28 DIAGNOSIS — Z65.9 PSYCHOSOCIAL PROBLEM: Primary | ICD-10-CM

## 2022-04-28 NOTE — PROGRESS NOTES
Clinic Care Coordination Contact  [facility]/Voicemail       Clinical Data: Care Coordinator Outreach  Outreach attempted x 1.  Left message on patient's daughter Tawny's voicemail with call back information and requested return call.  Plan:  Care Coordinator will try to reach patient again in 1-2 business days.    KELI Jackman  Social Work Care Coordinator - [facility]  Care Coordination  [email or web address]  Cell Phone: [phone]  Gender pronouns: she/her  Employed by [employer]

## 2022-05-02 ENCOUNTER — PATIENT OUTREACH (OUTPATIENT)
Dept: CARE COORDINATION | Facility: CLINIC | Age: 82
End: 2022-05-02
Payer: COMMERCIAL

## 2022-06-02 ENCOUNTER — PATIENT OUTREACH (OUTPATIENT)
Dept: CARE COORDINATION | Facility: CLINIC | Age: 82
End: 2022-06-02
Payer: COMMERCIAL

## 2022-06-02 NOTE — PROGRESS NOTES
Clinic Care Coordination Contact  Care Team Conversations    CC HEAVEN spoke with the pt's daughter Tawny. There is no current need for services from the [facility]. [facility] will no longer be outreaching to the pt and pt's family at this time. Pt's daughter has contact information for the [facility] for an needs that arise.     KELI Jackman  Social Work Care Coordinator - [facility]  Care Coordination  [email or web address]  Cell Phone: [phone]  Gender pronouns: she/her  Employed by [employer]

## 2022-06-02 NOTE — PROGRESS NOTES
Clinic Care Coordination Contact  Care Team Conversations    CC HEAVEN talked to Tawny and Tawny is going to become her PCA for now, before and after work for Tawny. Tawny stated her daughter isn't wanting to be the PCA any longer for the pt, and pt is refusing to go anywhere and just wants to stay with Tawny. We did discuss an adult  setting that is [facility] based for the pt and she Tawny stated she would love something like that for the pt, but pt refuses. I am going to check back in 2 weeks to see how things are. Tawny has my direct number to get ahold of me if anything comes up in the next couple weeks.    Rani Ceron, KELI  Social Work Care Coordinator - [facility]  Care Coordination  [email or web address]  [email or web address]  Cell Phone: [phone]  Gender pronouns: she/her  Employed by [employer]

## 2022-06-03 ENCOUNTER — VIRTUAL VISIT (OUTPATIENT)
Dept: PHARMACY | Facility: PHYSICIAN GROUP | Age: 82
End: 2022-06-03
Payer: COMMERCIAL

## 2022-06-03 DIAGNOSIS — R79.0 LOW MAGNESIUM LEVEL: ICD-10-CM

## 2022-06-03 DIAGNOSIS — E87.6 HYPOKALEMIA: ICD-10-CM

## 2022-06-03 DIAGNOSIS — R11.0 NAUSEA: Primary | ICD-10-CM

## 2022-06-03 DIAGNOSIS — J30.2 SEASONAL ALLERGIC RHINITIS, UNSPECIFIED TRIGGER: ICD-10-CM

## 2022-06-03 DIAGNOSIS — R79.89 LOW VITAMIN D LEVEL: ICD-10-CM

## 2022-06-03 DIAGNOSIS — Z86.73 HISTORY OF TIA (TRANSIENT ISCHEMIC ATTACK) AND STROKE: ICD-10-CM

## 2022-06-03 DIAGNOSIS — F32.A DEPRESSION, UNSPECIFIED DEPRESSION TYPE: ICD-10-CM

## 2022-06-03 DIAGNOSIS — I10 BENIGN ESSENTIAL HYPERTENSION: ICD-10-CM

## 2022-06-03 DIAGNOSIS — M48.00 SPINAL STENOSIS, UNSPECIFIED SPINAL REGION: ICD-10-CM

## 2022-06-03 PROCEDURE — 99605 MTMS BY PHARM NP 15 MIN: CPT | Performed by: PHARMACIST

## 2022-06-03 PROCEDURE — 99607 MTMS BY PHARM ADDL 15 MIN: CPT | Performed by: PHARMACIST

## 2022-06-03 NOTE — LETTER
_  Medication List        Prepared on: 6/3/2022     Bring your Medication List when you go to the doctor, hospital, or   emergency room. And, share it with your family or caregivers.     Note any changes to how you take your medications.  Cross out medications when you no longer use them.    Medication How I take it Why I use it Prescriber   acetaminophen-codeine (TYLENOL #3) 300-30 MG tablet Take 1 tablet by mouth every 6 hours as needed for severe pain Chronic pain Elmira Ayala PA-C   amLODIPine (NORVASC) 5 MG tablet Take 5 mg by mouth 2 times daily High Blood Pressure  Elmira Ayala PA-C   azelastine (OPTIVAR) 0.05 % ophthalmic solution Instill 1 drop to affected eye twice daily as needed for itchy/watery eyes Itchy eyes Elmira Ayala PA-C   cholecalciferol (VITAMIN D3) 125 mcg (5000 units) capsule Take 125 mcg by mouth daily Low vitamin D level Elmira Ayala PA-C   DULoxetine (CYMBALTA) 60 MG capsule Take 60 mg by mouth daily Chronic pain, Depression Elmira Ayala PA-C   potassium & sodium phosphates (NEUTRA-PHOS) 280-160-250 MG Packet Take 1 packet by mouth 4 times daily Low electrolytes (potassium and phosphate) Elmira Ayala PA-C         Add new medications, over-the-counter drugs, herbals, vitamins, or  minerals in the blank rows below.    Medication How I take it Why I use it Prescriber                          Allergies:      gabapentin        Side effects I have had:               Other Information:              My notes and questions:

## 2022-06-03 NOTE — LETTER
Radha 3, 2022  See Spring  [address] [address]    Dear Ms. Londono, [employer]        Thank you for talking with me on Abdulaziz 3, 2022 about your health and medications. As a follow-up to our conversation, I have included two documents:      1. Your Recommended To-Do List has steps you should take to get the best results from your medications.  2. Your Medication List will help you keep track of your medications and how to take them.    If you want to talk about these documents, please call Celine Elizabeth RPH at phone: [phone], Monday-Friday 8-4:30pm.    I look forward to working with you and your doctors to make sure your medications work well for you.    Sincerely,    Celine Elizabeth RPH  [facility] Pharmacist, [facility]

## 2022-06-03 NOTE — PROGRESS NOTES
Medication Therapy Management (MTM) Encounter    ASSESSMENT:                            Medication Adherence/Access: See below for considerations      Nausea/GI upset: could try crushing the calcium carbonate and see if she likes taking it that way, otherwise could switch to famotidine oral liquid.  She would be more likely to take a liquid vs a pill.  Discussed that if she continues to progressively get weaker and refuses to eat she could see if her mom would go to the hospital, however she has previously declined treatment and going to the hospital in the past.  Discussed that they also would not be able to force any treatments on her that she would not want to receive.     Hypertension: Stable. Patient is meeting blood pressure goal of < 140/90mmHg.  Could consider switching to amlodipine 10 mg daily to reduce the number of pills she is taking daily    History TIA: Patient likely should be taking at least aspirin 81 mg daily, however has preferred not to take medications historically.  Can defer until his primary care provider visit, but could discuss if she would restart the aspirin to prevent a stroke.       Depression: Taking duloxetine inconsistently could result in nausea and vomiting due to withdrawal side effects.  She did not want to take it because it is a depression medication but when she goes to give her her medications next time she can also let her mom know that it helps with chronic pain problems and back pain.  They could try taking it more consistently for a while by opening up the capsules and sprinkling into soft food and then swallowing.  If still having problems taking the duloxetine consistently could try switching to mirtazapine which may help with nausea and appetite, and also help with depression    Spinal stenosis: Stable, the Tylenol 3 has been helping.    Low potassium: Discussed trying to mix potassium and sodium phosphates packet in with orange soda to see if that would make her mom  more likely to want to take it.    Low Magnesium: Last magnesium level was low, but likely be beneficial for her to continue taking the magnesium supplement if she is open to it.  If her daughter can bring in the bottle to her next appointment with Luci we can add it to her medication list and send it in as a prescription    Low vitamin D: Would be beneficial to continue taking the vitamin D capsule.  If having difficulty with getting her to take it could switch to a vitamin D liquid instead which she may prefer to take.    Allergic conjunctivitis: Stable.    PLAN:                             1.  It's OK to crush the amlodipine and calcium carbonate tablets, then mix in the powder with applesauce or pudding to make it easier to take     2.  The duloxetine capsule helps with chronic pain issues as well as mood.  You could try opening the capsule and sprinkling the contents of the capsule onto applesauce or pudding, to make it easier to swallow    3.  You can try mixing the potassium and phosphate's powder packet in with her orange soda to see if it makes it taste better, and helps her want to take it more regularly    4.  If you could call us to let us know how many milligrams of magnesium she is taking we could send it in as a prescription.  You could also bring the bottle with you to your next appointment with Elmira.    5.  I forgot to mention when we talked on the phone, but vitamin D also comes in a liquid form where you could put a small amount of it into  Juice.  This may make it more pleasant to take the vitamin D on a regular basis    Follow-up:as needed, in 1 year for a yearly medication review or sooner if needed    SUBJECTIVE/OBJECTIVE:                          Errol Londono is a 82 year old female called for an initial visit. She was referred to me from her health [facility] insurance plan. Patient was accompanied by Her daughter Carmen.     Reason for visit: Initial medication review.  Patient has concerns with  low appetite and not wanting to eat or take some of her medications.  They wanted to discuss ways to make them easier to take or why she is taking each of the medications she is on and if they are really necessary.    Allergies/ADRs: Reviewed in chart  Past Medical History: Reviewed in chart  Tobacco: She has no history on file for tobacco use.  Alcohol: not currently using  Social: Lives with her daughter Carmen, she is having difficulties with taking care of her mom at home and they are looking into higher levels of care.      Medication Adherence/Access: Patient's daughter Carmen said she's been having difficulty getting her to take her medications.  She said See doesn't take it, that she doesn't need it.  She will take the amlodipine regularly, and vitamin D    Nausea/GI upset: Takes calcium carbonate 500 mg twice daily.  She is having still having issues with nausea.  Last night she stopped eating and she was having nausea, threw up a few times.  Also reports increased weakness, her daughter thinks that this is from not eating very much.  She has been trying to cook things that her mom would like, and her favorite foods she does like drinking orange soda, and this is the only way she will take her pills.  She does not like Ensure protein drinks.      Hypertension: Current medications include Amlodipine 5 mg twice daily.  Patient does not self-monitor blood pressure.  Patient reports no current medication side effects.  BP Readings from Last 3 Encounters:   04/18/22 114/77   06/24/21 (!) 171/152     History TIA:  Patient is NOT taking aspirin anymore.  It's on her medication list with [facility], but she stopped taking it.     Depression: Taking duloxetine 60 mg daily, but patient is often refusing this 1 and not taking it.  She reports that she does not have depression and does not need to take the duloxetine.  Patient also has history of spinal stenosis, has also been used to help with pain.    Spinal stenosis: Has  Tylenol with codeine # 3, up to 3 times daily as needed for severe pain.  Reports that this does help some with pain.  Typically uses 120 tablets/month on a regular basis.  Does not appear to be having constipation     Low potassium: Taking potassium/sodium phosphates packet 4 times daily but she is sometimes declining to take it.  She was supposed to come in yesterday for a follow-up to recheck her electrolytes but would not come in for the visit.  Her daughter rescheduled it for July 15, her mother did not want to come in sooner    Low Magnesium: Patient's daughter said she is taking a magnesium supplement on a daily basis, but does not know the milligram strength.  She said they will get this refilled and buy it over-the-counter.    Low vitamin D: Taking vitamin D 5000 units daily.  Patient does not always want to take it on a regular basis.    Allergic conjunctivitis: She has azelastine eyedrops, but does not use it regularly.  Does not usually ask for it from her daughter.    ----------------      I spent 60 minutes with this patient today. All changes were made via collaborative practice agreement with Elmira Ayala PA-C. A copy of the visit note was provided to the patient's provider(s).    The patient was mailed a summary of these recommendations.     Pablo HsuD  Medication Therapy Management Pharmacist  Pager: [phone]      Telemedicine Visit Details  Type of service:  Telephone visit  Start Time: 2:10 pm  End Time: 3:10 pm  Originating Location (patient location): [place]  Distant Location (provider location):  [facility]     Medication Therapy Recommendations  Depression, unspecified depression type    Current Medication: DULoxetine (CYMBALTA) 60 MG capsule   Rationale: Patient prefers not to take - Adherence - Adherence   Recommendation: Provide Adherence Intervention   Status: Patient Agreed - Adherence/Education   Note: Patient prefers not to take oral  medications at times.  Discussed ways to make them easier to take her more palatable.

## 2022-06-03 NOTE — LETTER
"Recommended To-Do List      Prepared on: 6/3/2022     You can get the best results from your medications by completing the items on this \"To-Do List.\"      Bring your To-Do List when you go to your doctor. And, share it with your family or caregivers.    My To-Do List:  What we talked about: What I should do:   The importance of taking your medication as intended    1.  It's OK to crush the amlodipine and calcium carbonate tablets, then mix in the powder with applesauce or pudding to make it easier to take     2.  The duloxetine capsule helps with chronic pain issues as well as mood.  You could try opening the capsule and sprinkling the contents of the capsule onto applesauce or pudding, to make it easier to swallow    3.  You can try mixing the potassium and phosphate's powder packet in with her orange soda to see if it makes it taste better, and helps her want to take it more regularly    4.  If you could call us to let us know how many milligrams of magnesium she is taking we could send it in as a prescription.  You could also bring the bottle with you to your next appointment with Elmira.    5.  I forgot to mention when we talked on the phone, but vitamin D also comes in a liquid form where you could put a small amount of it into  Juice.  This may make it more pleasant to take the vitamin D on a regular basis          What we talked about: What I should do:                       "

## 2022-06-14 ENCOUNTER — APPOINTMENT (OUTPATIENT)
Dept: GENERAL RADIOLOGY | Facility: CLINIC | Age: 82
DRG: 280 | End: 2022-06-14
Attending: EMERGENCY MEDICINE
Payer: COMMERCIAL

## 2022-06-14 ENCOUNTER — HOSPITAL ENCOUNTER (INPATIENT)
Facility: CLINIC | Age: 82
LOS: 8 days | Discharge: HOSPICE | DRG: 280 | End: 2022-06-22
Attending: EMERGENCY MEDICINE | Admitting: STUDENT IN AN ORGANIZED HEALTH CARE EDUCATION/TRAINING PROGRAM
Payer: COMMERCIAL

## 2022-06-14 DIAGNOSIS — R79.89 ELEVATED BRAIN NATRIURETIC PEPTIDE (BNP) LEVEL: ICD-10-CM

## 2022-06-14 DIAGNOSIS — I50.9 NEW ONSET OF CONGESTIVE HEART FAILURE (H): ICD-10-CM

## 2022-06-14 DIAGNOSIS — Z51.5 HOSPICE CARE PATIENT: Primary | ICD-10-CM

## 2022-06-14 DIAGNOSIS — J90 BILATERAL PLEURAL EFFUSION: ICD-10-CM

## 2022-06-14 DIAGNOSIS — N17.9 ACUTE KIDNEY INJURY (H): ICD-10-CM

## 2022-06-14 DIAGNOSIS — R79.89 ELEVATED TROPONIN: ICD-10-CM

## 2022-06-14 DIAGNOSIS — J96.01 ACUTE RESPIRATORY FAILURE WITH HYPOXIA (H): ICD-10-CM

## 2022-06-14 LAB
ALBUMIN SERPL-MCNC: 3.1 G/DL (ref 3.4–5)
ALBUMIN UR-MCNC: 20 MG/DL
ALP SERPL-CCNC: 133 U/L (ref 40–150)
ALT SERPL W P-5'-P-CCNC: 37 U/L (ref 0–50)
ANION GAP SERPL CALCULATED.3IONS-SCNC: 3 MMOL/L (ref 3–14)
APPEARANCE UR: CLEAR
AST SERPL W P-5'-P-CCNC: 29 U/L (ref 0–45)
BACTERIA #/AREA URNS HPF: ABNORMAL /HPF
BASE EXCESS BLDV CALC-SCNC: 7.3 MMOL/L (ref -7.7–1.9)
BASOPHILS # BLD AUTO: 0 10E3/UL (ref 0–0.2)
BASOPHILS NFR BLD AUTO: 0 %
BILIRUB DIRECT SERPL-MCNC: 0.2 MG/DL (ref 0–0.2)
BILIRUB SERPL-MCNC: 0.6 MG/DL (ref 0.2–1.3)
BILIRUB UR QL STRIP: NEGATIVE
BUN SERPL-MCNC: 33 MG/DL (ref 7–30)
CALCIUM SERPL-MCNC: 8.6 MG/DL (ref 8.5–10.1)
CHLORIDE BLD-SCNC: 98 MMOL/L (ref 94–109)
CO2 SERPL-SCNC: 30 MMOL/L (ref 20–32)
COLOR UR AUTO: YELLOW
CREAT SERPL-MCNC: 1.49 MG/DL (ref 0.52–1.04)
EOSINOPHIL # BLD AUTO: 0 10E3/UL (ref 0–0.7)
EOSINOPHIL NFR BLD AUTO: 0 %
ERYTHROCYTE [DISTWIDTH] IN BLOOD BY AUTOMATED COUNT: 13.1 % (ref 10–15)
FLUAV RNA SPEC QL NAA+PROBE: NEGATIVE
FLUBV RNA RESP QL NAA+PROBE: NEGATIVE
GFR SERPL CREATININE-BSD FRML MDRD: 35 ML/MIN/1.73M2
GLUCOSE BLD-MCNC: 127 MG/DL (ref 70–99)
GLUCOSE UR STRIP-MCNC: NEGATIVE MG/DL
HCO3 BLDV-SCNC: 32 MMOL/L (ref 21–28)
HCT VFR BLD AUTO: 34 % (ref 35–47)
HGB BLD-MCNC: 11.3 G/DL (ref 11.7–15.7)
HGB UR QL STRIP: NEGATIVE
HYALINE CASTS: 8 /LPF
IMM GRANULOCYTES # BLD: 0.1 10E3/UL
IMM GRANULOCYTES NFR BLD: 0 %
KETONES UR STRIP-MCNC: NEGATIVE MG/DL
LEUKOCYTE ESTERASE UR QL STRIP: NEGATIVE
LIPASE SERPL-CCNC: 65 U/L (ref 73–393)
LYMPHOCYTES # BLD AUTO: 0.4 10E3/UL (ref 0.8–5.3)
LYMPHOCYTES NFR BLD AUTO: 4 %
MCH RBC QN AUTO: 31 PG (ref 26.5–33)
MCHC RBC AUTO-ENTMCNC: 33.2 G/DL (ref 31.5–36.5)
MCV RBC AUTO: 93 FL (ref 78–100)
MONOCYTES # BLD AUTO: 0.7 10E3/UL (ref 0–1.3)
MONOCYTES NFR BLD AUTO: 6 %
MUCOUS THREADS #/AREA URNS LPF: PRESENT /LPF
NEUTROPHILS # BLD AUTO: 10 10E3/UL (ref 1.6–8.3)
NEUTROPHILS NFR BLD AUTO: 90 %
NITRATE UR QL: NEGATIVE
NRBC # BLD AUTO: 0 10E3/UL
NRBC BLD AUTO-RTO: 0 /100
NT-PROBNP SERPL-MCNC: ABNORMAL PG/ML (ref 0–1800)
O2/TOTAL GAS SETTING VFR VENT: 4 %
PCO2 BLDV: 47 MM HG (ref 40–50)
PH BLDV: 7.45 [PH] (ref 7.32–7.43)
PH UR STRIP: 5 [PH] (ref 5–7)
PLATELET # BLD AUTO: 283 10E3/UL (ref 150–450)
PO2 BLDV: 41 MM HG (ref 25–47)
POTASSIUM BLD-SCNC: 4.1 MMOL/L (ref 3.4–5.3)
PROT SERPL-MCNC: 7.2 G/DL (ref 6.8–8.8)
RBC # BLD AUTO: 3.64 10E6/UL (ref 3.8–5.2)
RBC URINE: 2 /HPF
RSV RNA SPEC NAA+PROBE: NEGATIVE
SARS-COV-2 RNA RESP QL NAA+PROBE: NEGATIVE
SODIUM SERPL-SCNC: 131 MMOL/L (ref 133–144)
SP GR UR STRIP: 1.02 (ref 1–1.03)
TROPONIN I SERPL HS-MCNC: 205 NG/L
UROBILINOGEN UR STRIP-MCNC: NORMAL MG/DL
WBC # BLD AUTO: 11.2 10E3/UL (ref 4–11)
WBC URINE: 1 /HPF

## 2022-06-14 PROCEDURE — 83690 ASSAY OF LIPASE: CPT | Performed by: EMERGENCY MEDICINE

## 2022-06-14 PROCEDURE — 250N000013 HC RX MED GY IP 250 OP 250 PS 637: Performed by: EMERGENCY MEDICINE

## 2022-06-14 PROCEDURE — C9803 HOPD COVID-19 SPEC COLLECT: HCPCS

## 2022-06-14 PROCEDURE — 93005 ELECTROCARDIOGRAM TRACING: CPT | Mod: 76

## 2022-06-14 PROCEDURE — 250N000011 HC RX IP 250 OP 636: Performed by: EMERGENCY MEDICINE

## 2022-06-14 PROCEDURE — 82310 ASSAY OF CALCIUM: CPT | Performed by: EMERGENCY MEDICINE

## 2022-06-14 PROCEDURE — 82803 BLOOD GASES ANY COMBINATION: CPT | Performed by: EMERGENCY MEDICINE

## 2022-06-14 PROCEDURE — 99285 EMERGENCY DEPT VISIT HI MDM: CPT | Mod: 25

## 2022-06-14 PROCEDURE — 96376 TX/PRO/DX INJ SAME DRUG ADON: CPT

## 2022-06-14 PROCEDURE — 96365 THER/PROPH/DIAG IV INF INIT: CPT

## 2022-06-14 PROCEDURE — 99223 1ST HOSP IP/OBS HIGH 75: CPT | Mod: AI | Performed by: STUDENT IN AN ORGANIZED HEALTH CARE EDUCATION/TRAINING PROGRAM

## 2022-06-14 PROCEDURE — 71045 X-RAY EXAM CHEST 1 VIEW: CPT

## 2022-06-14 PROCEDURE — 87637 SARSCOV2&INF A&B&RSV AMP PRB: CPT | Performed by: EMERGENCY MEDICINE

## 2022-06-14 PROCEDURE — 120N000001 HC R&B MED SURG/OB

## 2022-06-14 PROCEDURE — 82248 BILIRUBIN DIRECT: CPT | Performed by: EMERGENCY MEDICINE

## 2022-06-14 PROCEDURE — 96375 TX/PRO/DX INJ NEW DRUG ADDON: CPT

## 2022-06-14 PROCEDURE — 85025 COMPLETE CBC W/AUTO DIFF WBC: CPT | Performed by: EMERGENCY MEDICINE

## 2022-06-14 PROCEDURE — 84484 ASSAY OF TROPONIN QUANT: CPT | Performed by: EMERGENCY MEDICINE

## 2022-06-14 PROCEDURE — 93005 ELECTROCARDIOGRAM TRACING: CPT

## 2022-06-14 PROCEDURE — 83880 ASSAY OF NATRIURETIC PEPTIDE: CPT | Performed by: EMERGENCY MEDICINE

## 2022-06-14 PROCEDURE — 36415 COLL VENOUS BLD VENIPUNCTURE: CPT | Performed by: EMERGENCY MEDICINE

## 2022-06-14 PROCEDURE — 81001 URINALYSIS AUTO W/SCOPE: CPT | Performed by: EMERGENCY MEDICINE

## 2022-06-14 RX ORDER — FUROSEMIDE 10 MG/ML
40 INJECTION INTRAMUSCULAR; INTRAVENOUS
Status: DISCONTINUED | OUTPATIENT
Start: 2022-06-15 | End: 2022-06-18

## 2022-06-14 RX ORDER — HEPARIN SODIUM 10000 [USP'U]/100ML
0-5000 INJECTION, SOLUTION INTRAVENOUS CONTINUOUS
Status: DISCONTINUED | OUTPATIENT
Start: 2022-06-14 | End: 2022-06-14

## 2022-06-14 RX ORDER — HEPARIN SODIUM 10000 [USP'U]/100ML
0-5000 INJECTION, SOLUTION INTRAVENOUS CONTINUOUS
Status: DISCONTINUED | OUTPATIENT
Start: 2022-06-14 | End: 2022-06-16

## 2022-06-14 RX ORDER — ONDANSETRON 2 MG/ML
4 INJECTION INTRAMUSCULAR; INTRAVENOUS EVERY 6 HOURS PRN
Status: DISCONTINUED | OUTPATIENT
Start: 2022-06-14 | End: 2022-01-01 | Stop reason: HOSPADM

## 2022-06-14 RX ORDER — ASPIRIN 81 MG/1
81 TABLET ORAL DAILY
Status: DISCONTINUED | OUTPATIENT
Start: 2022-06-15 | End: 2022-06-20

## 2022-06-14 RX ORDER — ASPIRIN 81 MG/1
324 TABLET, CHEWABLE ORAL ONCE
Status: COMPLETED | OUTPATIENT
Start: 2022-06-14 | End: 2022-06-14

## 2022-06-14 RX ORDER — DULOXETIN HYDROCHLORIDE 60 MG/1
60 CAPSULE, DELAYED RELEASE ORAL DAILY
Status: DISCONTINUED | OUTPATIENT
Start: 2022-06-15 | End: 2022-06-20

## 2022-06-14 RX ORDER — LIDOCAINE 40 MG/G
CREAM TOPICAL
Status: DISCONTINUED | OUTPATIENT
Start: 2022-06-14 | End: 2022-01-01 | Stop reason: HOSPADM

## 2022-06-14 RX ORDER — ONDANSETRON 4 MG/1
4 TABLET, ORALLY DISINTEGRATING ORAL EVERY 6 HOURS PRN
Status: DISCONTINUED | OUTPATIENT
Start: 2022-06-14 | End: 2022-01-01 | Stop reason: HOSPADM

## 2022-06-14 RX ORDER — FUROSEMIDE 10 MG/ML
40 INJECTION INTRAMUSCULAR; INTRAVENOUS ONCE
Status: COMPLETED | OUTPATIENT
Start: 2022-06-14 | End: 2022-06-14

## 2022-06-14 RX ORDER — AMLODIPINE BESYLATE 5 MG/1
5 TABLET ORAL 2 TIMES DAILY
Status: DISCONTINUED | OUTPATIENT
Start: 2022-06-15 | End: 2022-06-18

## 2022-06-14 RX ADMIN — HEPARIN SODIUM AND DEXTROSE 650 UNITS/HR: 10000; 5 INJECTION INTRAVENOUS at 18:35

## 2022-06-14 RX ADMIN — FUROSEMIDE 40 MG: 10 INJECTION, SOLUTION INTRAVENOUS at 17:46

## 2022-06-14 RX ADMIN — ASPIRIN 81 MG CHEWABLE TABLET 324 MG: 81 TABLET CHEWABLE at 18:44

## 2022-06-14 ASSESSMENT — ENCOUNTER SYMPTOMS
APPETITE CHANGE: 1
DYSURIA: 0
COUGH: 0
CONSTIPATION: 0
DIARRHEA: 0
HEMATURIA: 0
WEAKNESS: 1
VOMITING: 1
SHORTNESS OF BREATH: 0
FEVER: 0

## 2022-06-14 ASSESSMENT — ACTIVITIES OF DAILY LIVING (ADL)
ADLS_ACUITY_SCORE: 35
ADLS_ACUITY_SCORE: 37

## 2022-06-14 NOTE — ED TRIAGE NOTES
Pt biba for increasing weakness and sob. Pt was 60% on RA per EMS and was placed on 6L. Pt arrives wearing winter boots and a fur hat and is very warm to the touch. Per EMS pt also noted some chest pressure. Is alert but minimally responsive despite attempts to use an .      Triage Assessment     Row Name 06/14/22 6205       Triage Assessment (Adult)    Airway WDL WDL       Respiratory WDL    Respiratory WDL X;rhythm/pattern    Rhythm/Pattern, Respiratory tachypneic       Skin Circulation/Temperature WDL    Skin Circulation/Temperature WDL X;temperature    Skin Temperature warm       Cardiac WDL    Cardiac WDL WDL       Peripheral/Neurovascular WDL    Peripheral Neurovascular WDL WDL       Cognitive/Neuro/Behavioral WDL    Cognitive/Neuro/Behavioral WDL X    Level of Consciousness alert    Arousal Level opens eyes spontaneously    Orientation disoriented x 4

## 2022-06-14 NOTE — ED NOTES
Straight cath'd pt. UA obtained. Pt tolerated well. Cleaned pt and changed into new brief. Applied monitoring devices (EKG, BP, and pulse ox) onto Patient.

## 2022-06-14 NOTE — ED PROVIDER NOTES
"  History   Chief Complaint:  Generalized Weakness       The history is provided by the patient and a relative (daughter). The history is limited by a language barrier. A  was used (Maureen).      Errol Londono is an 82 year old female with history of hypertension who presents via EMS for evaluation of weakness. Her granddaughter called EMS for increasing weakness that started yesterday. Her daughter reports they were concerned because Errol would not eat a normal amount the last week and typically only eats soup due to being edentulous. She does cough often while eating and has had a couple episodes of emesis.     Per EMS she was 60% on room air and was placed on 6L. She does not use oxygen at home. Errol's daughter does not feel she appears or is acting different than she normally does at home. She has been having a regular bowel movements and is able to make urine. She has had no complaints of dysuria and her daughter does not believe there was hematuria.    Errol was admitted in April for failure to thrive. Her daughter is unsure if Errol has dementia but notes her to be been \"forgetful\".    Errol's daughter denies shortness of breath, fever, or cough. While daughter was not at bedside, the patient, with use of , reported chest pain for the last 1 to 2 days. While using daughter as , she denies chest pain and her daughter believes her mother was \"lying\" about having chest pain.     Review of Systems   Unable to perform ROS: Dementia   Constitutional: Positive for appetite change. Negative for fever.   Respiratory: Negative for cough and shortness of breath.    Cardiovascular: Positive for chest pain.   Gastrointestinal: Positive for vomiting. Negative for constipation and diarrhea.   Genitourinary: Negative for decreased urine volume, dysuria and hematuria.   Neurological: Positive for weakness.     Allergies:  Gabapentin    Medications:  Norvasc  Optivar  Vitamin " D3  Cymbalta  Neutra-phos    Past Medical History:     Hypokalemia  Generalized muscle weakness  Failure to thrive in adult  Gait instability  Osteoporosis  Hypertension  Restless legs syndrome  Hypercholesterolemia  Plantar fascial fibromatosis  Venous insuffiency  Arthritis  GERD  Depression    Past Surgical History:    IR biliary tube change  IR transcatheter biopsy  Wrist surgery, bilateral  Tooth removal  Cholecystectomy  ERCP, stent placed  Cataract removal    Social History:  The patient presents to the ED via EMS. Daughter at bedside.  Living Situation: She lives with her daughter and granddaughter. They help her with her medications.  Ambulates with a walker.     Physical Exam     Patient Vitals for the past 24 hrs:   BP Temp Temp src Pulse Resp SpO2 Weight   06/14/22 2110 -- -- -- -- -- 91 % --   06/14/22 2108 120/78 -- -- 85 27 -- --   06/14/22 1813 -- -- -- -- -- -- 52.6 kg (116 lb)   06/14/22 1800 133/75 -- -- 86 25 95 % --   06/14/22 1711 -- -- -- 89 -- 93 % --   06/14/22 1700 128/79 -- -- 89 22 94 % --   06/14/22 1628 131/75 -- -- -- -- -- --   06/14/22 1623 -- 98.8  F (37.1  C) Rectal 94 24 93 % --       Physical Exam  General: Well-developed and well-nourished elderly ong woman.  Head:  Atraumatic.  Eyes:  Conjunctivae, lids, and sclerae are normal.  Neck:  Supple. Normal range of motion.  CV:  Regular rate and rhythm. Normal heart sounds with no murmurs, rubs, or gallops detected.  Resp:  No respiratory distress but appears tachypneic and dyspneic. Bibasilar rales without wheezing or rhonchi.  GI:  Soft. Non-distended. Non-tender.    MS:  Normal ROM. No bilateral lower extremity edema.  Skin:  Warm. Non-diaphoretic. No pallor.  Neuro: Sleeping but easily awakens to voice.   Psych:  Unable to assess.  Vitals reviewed.    Emergency Department Course   EKG #1  Indication: weakness  Time: 1640  Rate 90 bpm. VT interval 196. QRS duration 82. QT/QTc 352/430.   Normal sinus rhythm. Cannot rule out  anterior infarct, age undetermined. Abnormal ECG.   ST changes in V2 are new as compared to prior, dated 04/18/22.    EKG #2  Indication: weakness  Time: 1710  Rate 89 bpm. AL interval 192. QRS duration 82. QT/QTc 388/472.   Sinus rhythm with marked inus arrhythmia. T wave abnormality, consider anterior ischemia. Prolonged QT. Abnormal ECG.   No significant change as compared to prior, dated 06/14/22.    Imaging:  XR Chest Port 1 View   Final Result   IMPRESSION: Markedly enlarged cardiac silhouette, similar to prior   exam. New multifocal alveolar opacities, differential includes   multifocal pneumonia, and severe pulmonary edema. Small bilateral   pleural effusions, right larger than left. No pneumothorax. Bones are   unchanged.      JENNIFER PÉREZ MD            SYSTEM ID:  [id number]        Report per radiology    Laboratory:  Labs Ordered and Resulted from Time of ED Arrival to Time of ED Departure   ROUTINE UA WITH MICROSCOPIC REFLEX TO CULTURE - Abnormal       Result Value    Color Urine Yellow      Appearance Urine Clear      Glucose Urine Negative      Bilirubin Urine Negative      Ketones Urine Negative      Specific Gravity Urine 1.019      Blood Urine Negative      pH Urine 5.0      Protein Albumin Urine 20  (*)     Urobilinogen Urine Normal      Nitrite Urine Negative      Leukocyte Esterase Urine Negative      Bacteria Urine Few (*)     Mucus Urine Present (*)     RBC Urine 2      WBC Urine 1      Hyaline Casts Urine 8 (*)    BASIC METABOLIC PANEL - Abnormal    Sodium 131 (*)     Potassium 4.1      Chloride 98      Carbon Dioxide (CO2) 30      Anion Gap 3      Urea Nitrogen 33 (*)     Creatinine 1.49 (*)     Calcium 8.6      Glucose 127 (*)     GFR Estimate 35 (*)    TROPONIN I - Abnormal    Troponin I High Sensitivity 205 (*)    CBC WITH PLATELETS AND DIFFERENTIAL - Abnormal    WBC Count 11.2 (*)     RBC Count 3.64 (*)     Hemoglobin 11.3 (*)     Hematocrit 34.0 (*)     MCV 93      MCH 31.0      MCHC  33.2      RDW 13.1      Platelet Count 283      % Neutrophils 90      % Lymphocytes 4      % Monocytes 6      % Eosinophils 0      % Basophils 0      % Immature Granulocytes 0      NRBCs per 100 WBC 0      Absolute Neutrophils 10.0 (*)     Absolute Lymphocytes 0.4 (*)     Absolute Monocytes 0.7      Absolute Eosinophils 0.0      Absolute Basophils 0.0      Absolute Immature Granulocytes 0.1      Absolute NRBCs 0.0     HEPATIC FUNCTION PANEL - Abnormal    Bilirubin Total 0.6      Bilirubin Direct 0.2      Protein Total 7.2      Albumin 3.1 (*)     Alkaline Phosphatase 133      AST 29      ALT 37     LIPASE - Abnormal    Lipase 65 (*)    BLOOD GAS VENOUS - Abnormal    pH Venous 7.45 (*)     pCO2 Venous 47      pO2 Venous 41      Bicarbonate Venous 32 (*)     Base Excess/Deficit (+/-) 7.3 (*)     FIO2 4     NT PROBNP INPATIENT - Abnormal    N terminal Pro BNP Inpatient 18,991 (*)    INFLUENZA A/B & SARS-COV2 PCR MULTIPLEX - Normal    Influenza A PCR Negative      Influenza B PCR Negative      RSV PCR Negative      SARS CoV2 PCR Negative        Emergency Department Course:  Reviewed:  I reviewed nursing notes, vitals, past medical history, and Care Everywhere.    Assessments:  1645 I obtained history and examined the patient as noted above. Daughter at bedside.   1757 I rechecked the patient and updated the patient. At this time, daughter was not at bedside.   1805 I rechecked the patient with daughter at bedside.    Consults:  1844 I spoke to Dr. Licona, cardiology, regarding the patient.  1910 I spoke with Dr. Johnson, hospitalist.     Interventions:  1746 Lasix 40 mg IV  1835 Heparin loading dose 3,150 units IV  1835 Heparin infusion 25,000 units in D5W 250 mL anticoagulant 650 units/hr IV  1844 Aspirin 324 mg PO    Disposition:  The patient was admitted to the hospital under the care of Dr. Johnson.     Impression & Plan   Medical Decision Making:  See is an 82-year-old woman whose daughter tells me that they called EMS  because she is not eating as well for the last week as she typically would and seemed more weak today.  The patient herself is unable to provide any reliable history.  There is a question if she has chest pain but she denies this when her daughter asks her.  On exam she appears fatigued and is sleeping.  She does easily awaken to voice.  She is not in any distress but appears tachypneic and dyspneic with bibasilar rales. EMS found her to be hypoxic and she is requiring 4L NC in the ER. Her daughter does not feel her breathing is changed from baseline.  Her EKG is without arrhythmia.  There are ST changes anteriorly without depressions reciprocally.  Troponin is elevated at 205 so I obtained a repeat EKG.  Again there appears to be some elevation in ST segments anteriorly without reciprocal changes.  Further, without definitive chest pain I didn't activate code STEMI. I did discuss the case with Dr. Licona, cardiology, who agreed there was no obvious STEMI on EKG. I administered aspirin and initiated a heparin infusion. Likely the cause for her weakness and respiratory status with hypoxia is new onset CHF as evidenced by her exam, BNP of 18,991, and CXR with markedly enlarged cardiac silhouette and evidence of pulmonary edema with small bilateral effusions. She was given 40 mg IV Lasix to begin diuresis.    The remainder of her workup reveals no UTI, COVID-19, hypercarbia, pancreatitis, hepatitis, biliary obstruction, or severe anemia. She does have [place] with creatinine of 1.49 from baseline 0.82 in April. There is mild hyponatremia to 131 which is likely hypervolemic hyponatremia.     I updated the patient's daughter on findings and plan for admission. I answered all her questions and she verbalized understanding. Amenable to plan. I discussed the patient's case with Dr. Johnson, hospitalist, who accepts admission and has no further orders.    Diagnosis:    ICD-10-CM    1. New onset of congestive heart failure (H)  I50.9     2. Acute respiratory failure with hypoxia (H)  J96.01    3. Acute kidney injury (H)  N17.9    4. Bilateral pleural effusion  J90    5. Elevated troponin  R77.8    6. Elevated brain natriuretic peptide (BNP) level  R79.89        Scribe Disclosure:  I, Soniya Bennie, am serving as a scribe at 4:44 PM on 6/14/2022 to document services personally performed by Theresa Briones MD based on my observations and the provider's statements to me.          Theresa Briones MD  06/15/22 5633

## 2022-06-14 NOTE — LETTER
Referral for [facility] at [facility]. Pt is covid vaccinated.    FERMIN Thomas, LSW  Inpatient Care Coordination  MS[facility], [facility]  [phone]

## 2022-06-15 ENCOUNTER — APPOINTMENT (OUTPATIENT)
Dept: CARDIOLOGY | Facility: CLINIC | Age: 82
DRG: 280 | End: 2022-06-15
Attending: STUDENT IN AN ORGANIZED HEALTH CARE EDUCATION/TRAINING PROGRAM
Payer: COMMERCIAL

## 2022-06-15 LAB
ANION GAP SERPL CALCULATED.3IONS-SCNC: 6 MMOL/L (ref 3–14)
ATRIAL RATE - MUSE: 89 BPM
ATRIAL RATE - MUSE: 90 BPM
BUN SERPL-MCNC: 27 MG/DL (ref 7–30)
CALCIUM SERPL-MCNC: 8.2 MG/DL (ref 8.5–10.1)
CHLORIDE BLD-SCNC: 97 MMOL/L (ref 94–109)
CO2 SERPL-SCNC: 33 MMOL/L (ref 20–32)
CREAT SERPL-MCNC: 1.16 MG/DL (ref 0.52–1.04)
DIASTOLIC BLOOD PRESSURE - MUSE: NORMAL MMHG
DIASTOLIC BLOOD PRESSURE - MUSE: NORMAL MMHG
ERYTHROCYTE [DISTWIDTH] IN BLOOD BY AUTOMATED COUNT: 13.2 % (ref 10–15)
GFR SERPL CREATININE-BSD FRML MDRD: 47 ML/MIN/1.73M2
GLUCOSE BLD-MCNC: 119 MG/DL (ref 70–99)
HCT VFR BLD AUTO: 35.1 % (ref 35–47)
HGB BLD-MCNC: 11.3 G/DL (ref 11.7–15.7)
INTERPRETATION ECG - MUSE: NORMAL
INTERPRETATION ECG - MUSE: NORMAL
LVEF ECHO: NORMAL
MCH RBC QN AUTO: 30.5 PG (ref 26.5–33)
MCHC RBC AUTO-ENTMCNC: 32.2 G/DL (ref 31.5–36.5)
MCV RBC AUTO: 95 FL (ref 78–100)
P AXIS - MUSE: 57 DEGREES
P AXIS - MUSE: 66 DEGREES
PLATELET # BLD AUTO: 294 10E3/UL (ref 150–450)
POTASSIUM BLD-SCNC: 3.6 MMOL/L (ref 3.4–5.3)
PR INTERVAL - MUSE: 192 MS
PR INTERVAL - MUSE: 196 MS
QRS DURATION - MUSE: 82 MS
QRS DURATION - MUSE: 82 MS
QT - MUSE: 352 MS
QT - MUSE: 388 MS
QTC - MUSE: 430 MS
QTC - MUSE: 472 MS
R AXIS - MUSE: -6 DEGREES
R AXIS - MUSE: 2 DEGREES
RBC # BLD AUTO: 3.7 10E6/UL (ref 3.8–5.2)
SODIUM SERPL-SCNC: 136 MMOL/L (ref 133–144)
SYSTOLIC BLOOD PRESSURE - MUSE: NORMAL MMHG
SYSTOLIC BLOOD PRESSURE - MUSE: NORMAL MMHG
T AXIS - MUSE: 45 DEGREES
T AXIS - MUSE: 56 DEGREES
TROPONIN I SERPL HS-MCNC: 164 NG/L
UFH PPP CHRO-ACNC: 0.2 IU/ML
UFH PPP CHRO-ACNC: 0.21 IU/ML
UFH PPP CHRO-ACNC: 0.37 IU/ML
UFH PPP CHRO-ACNC: 0.58 IU/ML
VENTRICULAR RATE- MUSE: 89 BPM
VENTRICULAR RATE- MUSE: 90 BPM
WBC # BLD AUTO: 10.3 10E3/UL (ref 4–11)

## 2022-06-15 PROCEDURE — 120N000001 HC R&B MED SURG/OB

## 2022-06-15 PROCEDURE — 250N000013 HC RX MED GY IP 250 OP 250 PS 637: Performed by: STUDENT IN AN ORGANIZED HEALTH CARE EDUCATION/TRAINING PROGRAM

## 2022-06-15 PROCEDURE — 36415 COLL VENOUS BLD VENIPUNCTURE: CPT | Performed by: STUDENT IN AN ORGANIZED HEALTH CARE EDUCATION/TRAINING PROGRAM

## 2022-06-15 PROCEDURE — 99233 SBSQ HOSP IP/OBS HIGH 50: CPT | Performed by: STUDENT IN AN ORGANIZED HEALTH CARE EDUCATION/TRAINING PROGRAM

## 2022-06-15 PROCEDURE — 93306 TTE W/DOPPLER COMPLETE: CPT

## 2022-06-15 PROCEDURE — 85520 HEPARIN ASSAY: CPT | Performed by: EMERGENCY MEDICINE

## 2022-06-15 PROCEDURE — 99222 1ST HOSP IP/OBS MODERATE 55: CPT | Performed by: INTERNAL MEDICINE

## 2022-06-15 PROCEDURE — 84484 ASSAY OF TROPONIN QUANT: CPT | Performed by: STUDENT IN AN ORGANIZED HEALTH CARE EDUCATION/TRAINING PROGRAM

## 2022-06-15 PROCEDURE — 82310 ASSAY OF CALCIUM: CPT | Performed by: STUDENT IN AN ORGANIZED HEALTH CARE EDUCATION/TRAINING PROGRAM

## 2022-06-15 PROCEDURE — 250N000011 HC RX IP 250 OP 636: Performed by: STUDENT IN AN ORGANIZED HEALTH CARE EDUCATION/TRAINING PROGRAM

## 2022-06-15 PROCEDURE — 36415 COLL VENOUS BLD VENIPUNCTURE: CPT | Performed by: EMERGENCY MEDICINE

## 2022-06-15 PROCEDURE — 85027 COMPLETE CBC AUTOMATED: CPT | Performed by: STUDENT IN AN ORGANIZED HEALTH CARE EDUCATION/TRAINING PROGRAM

## 2022-06-15 PROCEDURE — 85520 HEPARIN ASSAY: CPT | Performed by: STUDENT IN AN ORGANIZED HEALTH CARE EDUCATION/TRAINING PROGRAM

## 2022-06-15 PROCEDURE — 93306 TTE W/DOPPLER COMPLETE: CPT | Mod: 26 | Performed by: INTERNAL MEDICINE

## 2022-06-15 RX ORDER — ATORVASTATIN CALCIUM 10 MG/1
10 TABLET, FILM COATED ORAL DAILY
Status: ON HOLD | COMMUNITY
End: 2022-01-01

## 2022-06-15 RX ORDER — FUROSEMIDE 20 MG
20 TABLET ORAL DAILY
Status: ON HOLD | COMMUNITY
End: 2022-01-01

## 2022-06-15 RX ORDER — CALCIUM CARBONATE 500 MG/1
2 TABLET, CHEWABLE ORAL DAILY
Status: ON HOLD | COMMUNITY
End: 2022-01-01

## 2022-06-15 RX ADMIN — DULOXETINE HYDROCHLORIDE 60 MG: 60 CAPSULE, DELAYED RELEASE ORAL at 09:29

## 2022-06-15 RX ADMIN — ASPIRIN 81 MG: 81 TABLET, COATED ORAL at 09:30

## 2022-06-15 RX ADMIN — HEPARIN SODIUM AND DEXTROSE 600 UNITS/HR: 10000; 5 INJECTION INTRAVENOUS at 21:34

## 2022-06-15 RX ADMIN — AMLODIPINE BESYLATE 5 MG: 5 TABLET ORAL at 21:06

## 2022-06-15 RX ADMIN — HEPARIN SODIUM AND DEXTROSE 750 UNITS/HR: 10000; 5 INJECTION INTRAVENOUS at 23:11

## 2022-06-15 RX ADMIN — HEPARIN SODIUM AND DEXTROSE 800 UNITS/HR: 10000; 5 INJECTION INTRAVENOUS at 07:40

## 2022-06-15 RX ADMIN — FUROSEMIDE 40 MG: 10 INJECTION, SOLUTION INTRAMUSCULAR; INTRAVENOUS at 17:53

## 2022-06-15 RX ADMIN — AMLODIPINE BESYLATE 5 MG: 5 TABLET ORAL at 09:30

## 2022-06-15 RX ADMIN — FUROSEMIDE 40 MG: 10 INJECTION, SOLUTION INTRAMUSCULAR; INTRAVENOUS at 09:25

## 2022-06-15 ASSESSMENT — ACTIVITIES OF DAILY LIVING (ADL)
ADLS_ACUITY_SCORE: 49
ADLS_ACUITY_SCORE: 51
ADLS_ACUITY_SCORE: 49
ADLS_ACUITY_SCORE: 51
ADLS_ACUITY_SCORE: 49
ADLS_ACUITY_SCORE: 51
ADLS_ACUITY_SCORE: 49
ADLS_ACUITY_SCORE: 51

## 2022-06-15 NOTE — PLAN OF CARE
Vss, no co pain/cp/sob. Daughter at bedside translating, also have IPAD at bedside if needed, Hmong speaking. Heparin at 800/hr, recheck is ordered for 1345, trops trending down, echo done with EF 65-70%, cardiology consulted, has been NPO. [place], up with Ax1+gb+walker, confused/forgetful, alarms on for safety, falls risk.  LS with exp wheezes/fine crackles throughout, IV lasix BID. Pt has no teeth or dentures so she requests having pills crushed and placed by themselves (plain) on a spoon, she then takes a drink of violeta pop with it. INC of urine, attempting strict I and O, cardiology following, favors IV diuresis and conservative management for now, ok to continue heparin as no s/s of bleeding noted. Continue poc and monitoring.       2:48 PM  Hepxa=0.58, orders are to pause drip for one hour and restart by subtracting 200, recheck is already ordered for 2130.  Evening shift RN is to restart the heparin drip at 1545 at 600/hr ([phone]).        Heart Failure Care Map  GOALS TO BE MET BEFORE DISCHARGE:    1. Decrease congestion and/or edema with diuretic therapy to achieve near optimal volume status.     Dyspnea improved: Yes, satisfactory for discharge.   Edema improved: Yes, satisfactory for discharge.        Net I/O and Weights since admission:   05/16 1500 - 06/15 1459  In: 142 [I.V.:142]  Out: 1075 [Urine:1075]  Net: -933     Vitals:    06/14/22 1813 06/14/22 2137 06/15/22 0551   Weight: 52.6 kg (116 lb) 48.4 kg (106 lb 12.8 oz) 50.4 kg (111 lb 1.6 oz)       2.  O2 sats > 90% on room air, or at prior home O2 therapy level.      Able to wean O2 this shift to keep sats above 90%?: No, further care required to meet this goal. Please explain still on 4L per NC.   Does patient use Home O2? No          Current oxygenation status:   SpO2: 91 %     O2 Device: Nasal cannula, Oxygen Delivery: 4 LPM    3.  Tolerates ambulation and mobility near baseline.     Ambulation: No, further care required to meet this goal.  "Please explain up with Ax1+gb+walker   Times patient ambulated with staff this shift: 1    Please review the Heart Failure Care Map for additional HF goal outcomes.    Melanie Ornelas RN  6/15/2022        Problem: Plan of Care - These are the overarching goals to be used throughout the patient stay.    Goal: Plan of Care Review/Shift Note  Description: The Plan of Care Review/Shift note should be completed every shift.  The Outcome Evaluation is a brief statement about your assessment that the patient is improving, declining, or no change.  This information will be displayed automatically on your shift note.  Outcome: Ongoing, Not Progressing  Goal: Patient-Specific Goal (Individualized)  Description: You can add care plan individualizations to a care plan. Examples of Individualization might be:  \"Parent requests to be called daily at 9am for status\", \"I have a hard time hearing out of my right ear\", or \"Do not touch me to wake me up as it startles me\".  Outcome: Ongoing, Not Progressing  Goal: Absence of Hospital-Acquired Illness or Injury  Outcome: Ongoing, Not Progressing  Intervention: Identify and Manage Fall Risk  Recent Flowsheet Documentation  Taken 6/15/2022 1106 by Melanie Ornelas, RN  Safety Promotion/Fall Prevention:    bed alarm on    safety round/check completed  Taken 6/15/2022 1026 by Melanie Ornelas, RN  Safety Promotion/Fall Prevention:    bed alarm on    safety round/check completed  Taken 6/15/2022 1001 by Melanie Ornelas, RN  Safety Promotion/Fall Prevention:    bed alarm on    safety round/check completed  Taken 6/15/2022 0934 by Melanie Ornelas, RN  Safety Promotion/Fall Prevention:    fall prevention program maintained    treat underlying cause    treat reversible contributory factors    supervised activity    safety round/check completed    room organization consistent    room near nurse's station    room door open    patient and family education    nonskid shoes/slippers when out of " bed    lighting adjusted    increase visualization of patient    increased rounding and observation    mobility aid in reach    clutter free environment maintained    activity supervised    assistive device/personal items within reach    bed alarm on  Taken 6/15/2022 0855 by Melanie Ornelas RN  Safety Promotion/Fall Prevention:    bed alarm on    safety round/check completed  Taken 6/15/2022 0737 by Melanie Ornelas RN  Safety Promotion/Fall Prevention:    bed alarm on    safety round/check completed  Intervention: Prevent Skin Injury  Recent Flowsheet Documentation  Taken 6/15/2022 0934 by Melanie Ornelas RN  Body Position: (with encouragement)    weight shifting    position changed independently    other (see comments)  Intervention: Prevent and Manage VTE (Venous Thromboembolism) Risk  Recent Flowsheet Documentation  Taken 6/15/2022 1026 by Melanie Ornelas RN  Activity Management: ambulated to bathroom  Taken 6/15/2022 0934 by Melanie Ornelas RN  Activity Management: (refused oob to chair, too sleepy per dtr)    activity adjusted per tolerance    activity encouraged    other (see comments)  Goal: Optimal Comfort and Wellbeing  Outcome: Ongoing, Not Progressing  Goal: Readiness for Transition of Care  Outcome: Ongoing, Not Progressing     Problem: Adjustment to Illness (Heart Failure)  Goal: Optimal Coping  Outcome: Ongoing, Not Progressing     Problem: Cardiac Output Decreased (Heart Failure)  Goal: Optimal Cardiac Output  Outcome: Ongoing, Not Progressing     Problem: Dysrhythmia (Heart Failure)  Goal: Stable Heart Rate and Rhythm  Outcome: Ongoing, Not Progressing     Problem: Fluid Imbalance (Heart Failure)  Goal: Fluid Balance  Outcome: Ongoing, Not Progressing     Problem: Functional Ability Impaired (Heart Failure)  Goal: Optimal Functional Ability  Outcome: Ongoing, Not Progressing  Intervention: Optimize Functional Ability  Recent Flowsheet Documentation  Taken 6/15/2022 1026 by Melanie Ornelas  RN  Activity Management: ambulated to bathroom  Taken 6/15/2022 0934 by Melanie Ornelas, RN  Activity Management: (refused oob to chair, too sleepy per dtr)    activity adjusted per tolerance    activity encouraged    other (see comments)     Problem: Oral Intake Inadequate (Heart Failure)  Goal: Optimal Nutrition Intake  Outcome: Ongoing, Not Progressing     Problem: Respiratory Compromise (Heart Failure)  Goal: Effective Oxygenation and Ventilation  Outcome: Ongoing, Not Progressing  Intervention: Promote Airway Secretion Clearance  Recent Flowsheet Documentation  Taken 6/15/2022 0934 by Melanie Ornelas, RN  Cough And Deep Breathing: done with encouragement     Problem: Sleep Disordered Breathing (Heart Failure)  Goal: Effective Breathing Pattern During Sleep  Outcome: Ongoing, Not Progressing

## 2022-06-15 NOTE — ED NOTES
[facility]  ED Nurse Handoff Report    See Spring is a 82 year old female   ED Chief complaint: Generalized Weakness  . ED Diagnosis:   Final diagnoses:   None     Allergies:   Allergies   Allergen Reactions     Gabapentin Unknown       Code Status: Full Code  Activity level - Baseline/Home:  Assist X 1. Activity Level - Current:   Assist X 1. Lift room needed: No. Bariatric: No   Needed: Yes   Isolation: No. Infection: Not Applicable.     Vital Signs:   Vitals:    06/14/22 1628 06/14/22 1700 06/14/22 1711 06/14/22 1813   BP: 131/75 128/79     BP Location: Left arm      Pulse:  89 89    Resp:  22     Temp:       TempSrc:       SpO2:  94% 93%    Weight:    52.6 kg (116 lb)       Cardiac Rhythm:  ,      Pain level:    Patient confused: Yes. Patient Falls Risk: Yes.   Elimination Status: Has voided   Patient Report - Initial Complaint: weakness, SOB. Focused Assessment: 82 year old female with history of generalized muscle weakness, hypokalemia, hypertension, failure to thrive in adult who presents per EMS for evaluation of weakness. Her granddaughter called EMS for increasing weakness that had started yesterday. Per EMS she was 60% on room air and was placed on 6L. She does not use oxygen at home. Her daughter notes that she has been eating less than normal for the past week and often has to remind her to eat. She is only eating soup recently as this is all she is able to. She had issues with getting dentures at the dentist. Her daughter notes that she often coughs when she eats and has vomited a couple of times. She was seen here last month for similar symptoms. Her daughter helps with medications and notes having trouble with See taking her medications. Her daughter notes she appears in the ED as she normally does at home. She has been having a regular bowel movements and is able to make urine. No dysuria or hematuria. She lives with her daughter and granddaughter. She uses a walker at home.  Per her daughter she is forgetful. Denies shortness of breath, fever or cough. While daughter was not at bedside on recheck, the patient, with use of , notes of chest pain for the last 1 to 2 days. While using daughter as , she denies chest pain.   Tests Performed: imaging, labs. Abnormal Results:   Abnormal Labs Resulted from Time of ED Arrival to Time of ED Departure   ROUTINE UA WITH MICROSCOPIC REFLEX TO CULTURE - Abnormal       Result Value    Color Urine Yellow      Appearance Urine Clear      Glucose Urine Negative      Bilirubin Urine Negative      Ketones Urine Negative      Specific Gravity Urine 1.019      Blood Urine Negative      pH Urine 5.0      Protein Albumin Urine 20  (*)     Urobilinogen Urine Normal      Nitrite Urine Negative      Leukocyte Esterase Urine Negative      Bacteria Urine Few (*)     Mucus Urine Present (*)     RBC Urine 2      WBC Urine 1      Hyaline Casts Urine 8 (*)    BASIC METABOLIC PANEL - Abnormal    Sodium 131 (*)     Potassium 4.1      Chloride 98      Carbon Dioxide (CO2) 30      Anion Gap 3      Urea Nitrogen 33 (*)     Creatinine 1.49 (*)     Calcium 8.6      Glucose 127 (*)     GFR Estimate 35 (*)    TROPONIN I - Abnormal    Troponin I High Sensitivity 205 (*)    CBC WITH PLATELETS AND DIFFERENTIAL - Abnormal    WBC Count 11.2 (*)     RBC Count 3.64 (*)     Hemoglobin 11.3 (*)     Hematocrit 34.0 (*)     MCV 93      MCH 31.0      MCHC 33.2      RDW 13.1      Platelet Count 283      % Neutrophils 90      % Lymphocytes 4      % Monocytes 6      % Eosinophils 0      % Basophils 0      % Immature Granulocytes 0      NRBCs per 100 WBC 0      Absolute Neutrophils 10.0 (*)     Absolute Lymphocytes 0.4 (*)     Absolute Monocytes 0.7      Absolute Eosinophils 0.0      Absolute Basophils 0.0      Absolute Immature Granulocytes 0.1      Absolute NRBCs 0.0     HEPATIC FUNCTION PANEL - Abnormal    Bilirubin Total 0.6      Bilirubin Direct 0.2      Protein Total  7.2      Albumin 3.1 (*)     Alkaline Phosphatase 133      AST 29      ALT 37     LIPASE - Abnormal    Lipase 65 (*)    BLOOD GAS VENOUS - Abnormal    pH Venous 7.45 (*)     pCO2 Venous 47      pO2 Venous 41      Bicarbonate Venous 32 (*)     Base Excess/Deficit (+/-) 7.3 (*)     FIO2 4     NT PROBNP INPATIENT - Abnormal    N terminal Pro BNP Inpatient 18,991 (*)         Treatments provided: see MAR  Family Comments: Daughter - involved  OBS brochure/video discussed/provided to patient:  N/A  ED Medications:   Medications   heparin infusion 25,000 units in D5W 250 mL ANTICOAGULANT (650 Units/hr Intravenous New Bag 6/14/22 1835)   furosemide (LASIX) injection 40 mg (40 mg Intravenous Given 6/14/22 1746)   aspirin (ASA) chewable tablet 324 mg (324 mg Oral Given 6/14/22 1844)   heparin loading dose for LOW INTENSITY TREATMENT * Give BEFORE starting heparin infusion (3,150 Units Intravenous Given 6/14/22 1835)     Drips infusing:  Yes  For the majority of the shift, the patient's behavior Green. Interventions performed were NA.    Sepsis treatment initiated: No     Patient tested for COVID 19 prior to admission: YES - negative    ED Nurse Name/Phone Number: Teresa Gutierrez RN,   7:19 PM    RECEIVING UNIT ED HANDOFF REVIEW    Above ED Nurse Handoff Report was reviewed: Yes  Reviewed by: Marcel Zafar RN on June 14, 2022 at 9:00 PM

## 2022-06-15 NOTE — PHARMACY-ADMISSION MEDICATION HISTORY
Admission medication history interview status for this patient is complete. See [facility] admission navigator for allergy information, prior to admission medications and immunization status.     Medication history interview done, indicate source(s): Patient and Family (Daughter - Carmen)  Medication history resources (including written lists, pill bottles, clinic record): Epic chart, Sure Scripts, Care Everywhere  [facility]    Changes made to PTA medication list:  Added: atorvastatin 10mg, calcium carbonate, furosemide 20mg, magnesium, potassium  Changed: none  Reported as Not Taking: none  Removed: neutra-phos and azelastine 0.05% drops    Actions taken by pharmacist (provider contacted, etc): None     Additional medication history information: Pt has difficulty swallowing large pills, so her daughter has been smashing the OTC potassium and magnesium tablets. Per daughter, they would like the neutra-phos packets that were prescribed in the past and any needed supplements in powder formulation if possible.    Medication reconciliation/reorder completed by provider prior to medication history?  N     Prior to Admission medications    Medication Sig Last Dose Taking? Auth Provider Long Term End Date   acetaminophen-codeine (TYLENOL #3) 300-30 MG tablet Take 1 tablet by mouth every 6 hours as needed for severe pain 6/14/2022 at AM Yes Elmira Ayala PA-C     amLODIPine (NORVASC) 5 MG tablet Take 5 mg by mouth 2 times daily 6/14/2022 at PM Yes Elmira Ayala PA-C Yes    atorvastatin (LIPITOR) 10 MG tablet Take 10 mg by mouth daily 6/14/2022 at AM Yes Unknown, Entered By History No    calcium carbonate (TUMS) 500 MG chewable tablet Take 2 chew tab by mouth daily 6/14/2022 at AM Yes Unknown, Entered By History     cholecalciferol (VITAMIN D3) 125 mcg (5000 units) capsule Take 125 mcg by mouth daily 6/14/2022 at AM Yes Elmira Ayala PA-C     DULoxetine (CYMBALTA) 60 MG capsule Take 60 mg by mouth daily  6/14/2022 at AM Yes Elmira Ayala PA-C Yes    furosemide (LASIX) 20 MG tablet Take 20 mg by mouth daily 6/14/2022 at AM Yes Unknown, Entered By History No    MAGNESIUM PO Take 1 tablet by mouth daily 6/14/2022 at AM Yes Unknown, Entered By History     POTASSIUM PO Take 1 tablet by mouth daily 6/14/2022 at AM Yes Unknown, Entered By History

## 2022-06-15 NOTE — CONSULTS
[facility]    Cardiology Consultation     Date of Admission:  6/14/2022    Primary Care Physician   Elmira Ayala    Consult Date: 06/15/2022    REASON FOR CONSULTATION:  Heart failure.    REFERRING PHYSICIAN:  Dr. Sulaiman Johnson.    IMPRESSION:     1.  Diastolic heart failure exacerbation.  2.  Confusion, likely dementia.  3.  Stage III chronic renal failure.  4.  Hypertension.  5.  Acute anemia.  6.  History of depression.    This lady is unable to give me a coherent history.  She is somnolent and confused at this time.  Her daughter who was present tells me that she has not complained of shortness of breath or chest discomfort; however, she is clearly dyspneic with signs of CHF.    I personally reviewed her echocardiogram and I agree completely with my colleagues interpretation.  She has a stage I diastolic dysfunction and moderate pulmonary hypertension.    She has some wheezing and I would not recommend any beta blockers at this time.  I think we should aggressively diurese her.  Her troponins are mildly elevated and I think this is likely due to heart failure exacerbation rather than acute coronary syndrome.      Once she adequately recovers, we can evaluate for myocardial ischemia non invasively.  Unless very large areas of myocardium in jeopardy are detected, we should continue with conservative management.  It will be a pleasure to follow her in hospital course with you as necessary.    HISTORY OF PRESENT ILLNESS:  An 82-year-old ong lady admitted with weakness and confusion at home.  Her confusion has gradually been developing over the past few months; however, for the past few days, she has progressively become so weak that she is hardly able to move all.  For these reasons, she was brought to the emergency room by her family.  Her daughter tells me that she had no complaints of chest pains or shortness of breath.  There is no positive family history of premature atherosclerotic  disease.  She does not smoke, drink or abuse drugs.  She is on medications for hypertension.  There is some question as to whether she has been compliant with these medications.  Daughter also says she has not had any fevers, weight gain, PND, orthopnea, urinary or bowel disturbances.    PHYSICAL EXAMINATION:    Past Medical History   I have reviewed this patient's medical history and updated it with pertinent information if needed.   History reviewed. No pertinent past medical history.    Past Surgical History   I have reviewed this patient's surgical history and updated it with pertinent information if needed.  Past Surgical History:   Procedure Laterality Date    IR BILIARY TUBE CHANGE  5/26/2011    IR TRANSCATHETER BIOPSY  5/26/2011       Prior to Admission Medications   Prior to Admission Medications   Prescriptions Last Dose Informant Patient Reported? Taking?   DULoxetine (CYMBALTA) 60 MG capsule 6/14/2022 at AM Daughter Yes Yes   Sig: Take 60 mg by mouth daily   MAGNESIUM PO 6/14/2022 at AM  Yes Yes   Sig: Take 1 tablet by mouth daily   acetaminophen-codeine (TYLENOL #3) 300-30 MG tablet 6/14/2022 at AM Daughter Yes Yes   Sig: Take 1 tablet by mouth every 6 hours as needed for severe pain   amLODIPine (NORVASC) 5 MG tablet 6/14/2022 at PM Daughter Yes Yes   Sig: Take 5 mg by mouth 2 times daily   atorvastatin (LIPITOR) 10 MG tablet 6/14/2022 at AM  Yes Yes   Sig: Take 10 mg by mouth daily   calcium carbonate (TUMS) 500 MG chewable tablet 6/14/2022 at AM  Yes Yes   Sig: Take 2 chew tab by mouth daily   cholecalciferol (VITAMIN D3) 125 mcg (5000 units) capsule 6/14/2022 at AM Daughter Yes Yes   Sig: Take 125 mcg by mouth daily   furosemide (LASIX) 20 MG tablet 6/14/2022 at AM  Yes Yes   Sig: Take 20 mg by mouth daily   potassium phosphate, monobasic, (K-PHOS) 500 MG tablet 6/14/2022 at Unknown time  Yes Yes   Sig: Take 500 mg by mouth daily      Facility-Administered Medications: None     Allergies   Allergies    Allergen Reactions    Gabapentin Unknown       Social History   I have reviewed this patient's social history and updated it with pertinent information if needed. See Spring      Family History   I have reviewed this patient's family history and updated it with pertinent information if needed.   Family History   Problem Relation Age of Onset    No Known Problems Mother     No Known Problems Father        Review of Systems   The 10 point Review of Systems is negative other than noted in the HPI or here.     Physical Exam   Temp: 99  F (37.2  C) Temp src: Oral BP: 127/82 Pulse: 92   Resp: 16 SpO2: 93 % O2 Device: Nasal cannula Oxygen Delivery: 4 LPM  Vital Signs with Ranges  Temp:  [98.1  F (36.7  C)-99  F (37.2  C)] 99  F (37.2  C)  Pulse:  [76-93] 92  Resp:  [16-20] 16  BP: (106-127)/(66-82) 127/82  SpO2:  [92 %-94 %] 93 %  110 lbs 9.6 oz    GENERAL:  Reveals a frail appearing lady who is confused and somnolent.  VITAL SIGNS:  Blood pressure 137/83.  Temperature 99.2.   SKIN:  Since she has no clubbing, no peripheral central cyanosis.  HEENT:  Mucous membranes appear normal.  NECK:  Revealed distended external jugular veins.   EXTREMITIES:  No thyromegaly.  CARDIAC:  Cardiac apex is not palpable.  Heart sounds are unremarkable.  CHEST:  Symmetrical, but reduced expansion.  Diffuse soft wheezing, louder on the left than the right.  ABDOMEN:  Soft and nontender.  No hepatosplenomegaly.  The abdominal aorta is not palpable.    LABORATORY DATA:  Creatinine is 1.16, GFR 47.  Potassium is 3.6, sodium 136.  Albumin 3.1.  Liver function test within normal limits.  Hemoglobin 11.3, white cell count 10.3, platelet count of 294.     IMAGING:  Chest x-ray is reported as showing markedly enlarged cardiac silhouette, new multifocal alveolar opacities, small bilateral pleural effusions.    NT-proBNP is 18,991.  Data   Results for orders placed or performed during the hospital encounter of 06/14/22 (from the past 24 hour(s))    Heparin Unfractionated Anti Xa Level   Result Value Ref Range    Anti Xa Unfractionated Heparin 0.58 For Reference Range, See Comment IU/mL    Narrative    Therapeutic Range: UFH: 0.25-0.50 IU/mL for low intensity dosing,  0.30-0.70 IU/mL for high intensity dosing DVT and PE.  This test is not validated for other direct factor X inhibitors (e.g. rivaroxaban, apixaban, edoxaban, betrixaban, fondaparinux) and should not be used for monitoring of other medications.   Heparin Unfractionated Anti Xa Level   Result Value Ref Range    Anti Xa Unfractionated Heparin 0.20 For Reference Range, See Comment IU/mL    Narrative    Therapeutic Range: UFH: 0.25-0.50 IU/mL for low intensity dosing,  0.30-0.70 IU/mL for high intensity dosing DVT and PE.  This test is not validated for other direct factor X inhibitors (e.g. rivaroxaban, apixaban, edoxaban, betrixaban, fondaparinux) and should not be used for monitoring of other medications.   Heparin Unfractionated Anti Xa Level   Result Value Ref Range    Anti Xa Unfractionated Heparin 0.17 For Reference Range, See Comment IU/mL    Narrative    Therapeutic Range: UFH: 0.25-0.50 IU/mL for low intensity dosing,  0.30-0.70 IU/mL for high intensity dosing DVT and PE.  This test is not validated for other direct factor X inhibitors (e.g. rivaroxaban, apixaban, edoxaban, betrixaban, fondaparinux) and should not be used for monitoring of other medications.   Basic metabolic panel   Result Value Ref Range    Sodium 135 133 - 144 mmol/L    Potassium 3.0 (L) 3.4 - 5.3 mmol/L    Chloride 94 94 - 109 mmol/L    Carbon Dioxide (CO2) 34 (H) 20 - 32 mmol/L    Anion Gap 7 3 - 14 mmol/L    Urea Nitrogen 24 7 - 30 mg/dL    Creatinine 1.07 (H) 0.52 - 1.04 mg/dL    Calcium 8.0 (L) 8.5 - 10.1 mg/dL    Glucose 105 (H) 70 - 99 mg/dL    GFR Estimate 52 (L) >60 mL/min/1.73m2   Blood gas venous   Result Value Ref Range    pH Venous 7.49 (H) 7.32 - 7.43    pCO2 Venous 50 40 - 50 mm Hg    pO2 Venous 56 (H) 25  - 47 mm Hg    Bicarbonate Venous 38 (H) 21 - 28 mmol/L    Base Excess/Deficit (+/-) 12.3 (H) -7.7 - 1.9 mmol/L    FIO2 4    Ammonia   Result Value Ref Range    Ammonia <10 (L) 10 - 50 umol/L             Abdullahi Bhatt MD, [facility]        D: 06/15/2022   T: 06/15/2022   MT: POP    Name:     AUGUST SEE  MRN:      [id number]        Account:      [id number]   :      1940           Consult Date: 06/15/2022     Document: [id number]

## 2022-06-15 NOTE — H&P
[facility]    History and Physical - Hospitalist Service       Date of Admission:  6/14/2022    Assessment & Plan      See Spring is a 82 year old female with history of generalized muscle weakness, hypokalemia, hypertension, failure to thrive in adult who presents via EMS for evaluation of weakness.        Generalized Weakness    New onset of congestive heart failure (H)    Assessment: Patient presents with several days of increasing generalized weakness, lethargy.  On admission labs are pertinent for a proBNP of 18,991, troponin of 205, sodium of 131, creatinine of 1.49.  EKG on admission shows sinus rhythm with no dynamic ST changes.  A chest x-ray was obtained, and shows new multifocal alveolar opacities, differential includes multifocal pneumonia, and severe pulmonary edema. Small bilateral pleural effusions, right larger than left. No pneumothorax.  Overall clinical presentation consistent with acute decompensated heart failure.  Patient has no prior history of CAD or heart failure.    Plan:   -Admit to inpatient  -IV heparin infusion  -Cardiology consulted  -Lasix 40 mg twice daily  -Daily BMP while on IV diuresis  -Daily aspirin  -Obtain echocardiogram  -Repeat troponin in a.m.    Acute kidney injury  Hyponatremia  Assessment/plan: Hyponatremia suspect secondary to hypovolemic status . Creatinine on admission 1.49, suspect secondary to renal congestion in the setting of congestive heart failure.  We will diurese overnight and reassess BMP in a.m.     Diet: Combination Diet Low Saturated Fat Na <2400mg Diet, No Caffeine Diet  NPO for Medical/Clinical Reasons Except for: Meds, Ice Chips    DVT Prophylaxis: Heparin SQ  Davis Catheter: Not present  Central Lines: None  Cardiac Monitoring: ACTIVE order. Indication: Acute decompensated heart failure (48 hours)  Code Status: Full Code      Clinically Significant Risk Factors Present on Admission         # Hyponatremia: Na = 131 mmol/L (Ref  range: 133 - 144 mmol/L) on admission, will monitor as appropriate     # Hypoalbuminemia: Albumin = 3.1 g/dL (Ref range: 3.4 - 5.0 g/dL) on admission, will monitor as appropriate          Disposition Plan   Expected Discharge:    Anticipated discharge location:  Awaiting care coordination huddle  Delays:            The patient's care was discussed with the Patient, Patient's Family and ED Provider.    Sulaiman Johnson MD  Hospitalist Service  [facility]  Securely message with the Vocera Web Console (learn more here)  Text page via [employer] Paging/Directory         ______________________________________________________________________    Chief Complaint     Generalized Weakness    History is obtained from the patient, electronic health record, emergency department physician and patient's daughter    History of Present Illness     See Spring is a 82 year old female with history of generalized muscle weakness, hypokalemia, hypertension, failure to thrive in adult who presents via EMS for evaluation of weakness.      Patient's granddaughter activated EMS for further assistance due to patient's increasing generalized weakness over the past 24 to 48 hours.  She was found to be hypoxic and needing 6 L supplemental oxygen.  At baseline patient is not on supplemental oxygen.  Patient's granddaughter also reports that for the past week, the patient has had worsening p.o. intake and overall more lethargy.  There have been no reports of any blood in her stool, she denies any chest pain.  She did actually denies any shortness of breath.  Patient is without any dysuria or hematuria.  She currently resides with her daughter and granddaughter.  At baseline she uses a walker to ambulate.  She has baseline cognitive impairment, very forgetful.  And it appears that her granddaughter reports that the patient has been reporting of intermittent chest discomfort.  However at this time the patient herself denies any chest  pain.  Otherwise no recent fevers or chills have been reported at home.  Patient herself denies any headaches, slurred speech, localized weakness or numbness of her extremities.  No recent diarrhea, no nausea/vomiting or abdominal pain.  Patient denies any other complaints at this time.    Review of Systems    The 10 point Review of Systems is negative other than noted in the HPI or here.     Past Medical History    I have reviewed this patient's medical history and updated it with pertinent information if needed.   History reviewed. No pertinent past medical history.    Past Surgical History   I have reviewed this patient's surgical history and updated it with pertinent information if needed.  Past Surgical History:   Procedure Laterality Date     IR BILIARY TUBE CHANGE  5/26/2011     IR TRANSCATHETER BIOPSY  5/26/2011       Social History   I have reviewed this patient's social history and updated it with pertinent information if needed.       Family History   I have reviewed this patient's family history and updated it with pertinent information if needed.  Family History   Problem Relation Age of Onset     No Known Problems Mother      No Known Problems Father      Prior to Admission Medications   Prior to Admission Medications   Prescriptions Last Dose Informant Patient Reported? Taking?   DULoxetine (CYMBALTA) 60 MG capsule  Daughter Yes No   Sig: Take 60 mg by mouth daily   acetaminophen-codeine (TYLENOL #3) 300-30 MG tablet  Daughter Yes No   Sig: Take 1 tablet by mouth every 6 hours as needed for severe pain   amLODIPine (NORVASC) 5 MG tablet  Daughter Yes No   Sig: Take 5 mg by mouth 2 times daily   azelastine (OPTIVAR) 0.05 % ophthalmic solution  Daughter Yes No   Sig: Instill 1 drop to affected eye twice daily as needed for itchy/watery eyes   cholecalciferol (VITAMIN D3) 125 mcg (5000 units) capsule  Daughter Yes No   Sig: Take 125 mcg by mouth daily   potassium & sodium phosphates (NEUTRA-PHOS)  280-160-250 MG Packet   Yes No   Sig: Take 1 packet by mouth 4 times daily      Facility-Administered Medications: None     Allergies   Allergies   Allergen Reactions     Gabapentin Unknown       Physical Exam   Vital Signs: Temp: 98.5  F (36.9  C) Temp src: Oral BP: 120/66 Pulse: 85   Resp: 28 SpO2: 96 % O2 Device: Nasal cannula Oxygen Delivery: 4 LPM  Weight: 106 lbs 12.8 oz    Constitutional: awake, alert, cooperative, no apparent distress.   Eyes: Lids and lashes normal, pupils equal, round and reactive to light   ENT: Normocephalic, without obvious abnormality, atraumatic, sinuses nontender on palpation   Hematologic / Lymphatic: no cervical lymphadenopathy   Respiratory: Bibasilar crackles  Cardiovascular: RRR with no m/r/g   GI: Normal bowel sounds, soft, non-distended, non-tender.   Skin: normal skin color, texture, turgor   Musculoskeletal: There is no redness, warmth, or swelling of the joints. Full range of motion noted.   Neurologic: Awake, alert, oriented to name, place and time. Cranial nerves II-XII are grossly intact. Motor is 5 out of 5 bilaterally. Sensory is intact.   Neuropsychiatric: normal mood and affect      Data   Data reviewed today: I reviewed all medications, new labs and imaging results over the last 24 hours. I personally reviewed the EKG tracing showing NSR and the chest x-ray image(s) showing see below.    CXR  IMPRESSION: Markedly enlarged cardiac silhouette, similar to prior  exam. New multifocal alveolar opacities, differential includes  multifocal pneumonia, and severe pulmonary edema. Small bilateral  pleural effusions, right larger than left. No pneumothorax. Bones are  unchanged.

## 2022-06-15 NOTE — PROVIDER NOTIFICATION
Howard SAINZ [id number]: I am following up about her diet status. Pt is NPO. She is hungry and wants something to eat. I have been told she had some issues swallowing.  Per Family:  she is eating soft chew foods due to missing dentures, no issues swallowing. Can we change her diet order? Thanks    MD: yes, advance slowly, Low sodium, 1800ml fluid restriction     RN: so you want me to put it in as verbal order?     MD: please do

## 2022-06-15 NOTE — PROVIDER NOTIFICATION
FYI paged dr caballero: Trop now at 164, down from 205. also, please consider a speech eval for dysphagia per daughter report once pt is no longer NPO. Thanks.    md acknowledged.

## 2022-06-15 NOTE — PROGRESS NOTES
[facility]    Medicine Progress Note - Hospitalist Service  Date of Admission: 6/14/2022     Assessment & Plan         See Spring is a 82 year old female with past medical history significant for generalized muscle weakness, hypokalemia, hypertension and failure to thrive who presented to [facility] on 6/14/2022 with weakness and was found to have new onset congestive heart failure.    New Onset Congestive Heart Failure w/ Exacerbation  Moderate Pulmonary Hypertension  Increased weakness and generalized malaise. No reports of shortness of breath or swelling, but found to have NTproBNP 18,991 with slightly elevated troponin to 205. ECG without dynamic ST changes, but with some TWI in anterior leads. TTE with diastolic dysfunction and moderate pulmonary hypertension, but with preserved ejection fraction. Started on diuresis and heparin gtt on admission.  -Cardiology consulted, appreciate recommendations  -Lasix 40mg IV BID  -Heparin gtt  -Ischemic workup per cardiology  -Does show some possible obstructive breathing pattern, which may warrant a sleep study on discharge    Troponin Elevation  Troponin 205-->164. Suspect related to supply/demand mismatch in setting of heart failure exacerbation.    Acute Kidney Injury  Creatinine 1.49-->1.16. Suspect related to renal congestion as improving with diuresis.    Acute Anemia  Hgb 11.3, decreased from 12.7 4/2022. Associated with decreased hematocrit consistent with hemodilution.    Hyponatremia, resolved     Diet: Orders Placed This Encounter      NPO for Medical/Clinical Reasons Except for: Meds, Ice Chips   DVT Prophylaxis: heparin gtt  Davis Catheter:  Not present  Code Status: Full Code    Expected discharge: Likely 2-3 days pending adequate diuresis and clinical improvement; patient very weak and likely will need placement    The patient's care was discussed with the Bedside Nurse, Patient and Patient's Family.    Adolfo Eden MD,  [facility]  Hospitalist Service  [facility]  ______________________________________________________________________    Interval History   Nursing notes reviewed. No acute events overnight. Patient stated he wishes to be discharged, as interpreted by her daughter who is at bedside. Attempted to interview patient with a professional in person , but was unsuccessful in reaching on via iPad after several attempts. No other subjective history able to be obtained.    Physical Exam   Vital signs:  Temp: 98.2  F (36.8  C) Temp src: Oral BP: 124/80 Pulse: 97   Resp: 16 SpO2: 94 % O2 Device: Nasal cannula Oxygen Delivery: 4 LPM   Weight: 50.4 kg (111 lb 1.6 oz)  Estimated body mass index is 21.7 kg/m  as calculated from the following:    Height as of 6/24/21: 1.524 m (5').    Weight as of this encounter: 50.4 kg (111 lb 1.6 oz).    General: Elderly female resting in bed.  Somnolent, but speaks to daughter in Hmong occasionally. Does not open eyes.  HEENT: Normocephalic, atraumatic.  Mucous membranes moist.  Cardiac: Regular rate and rhythm without murmur, gallop, or rub.  1+ peripheral edema.  Respiratory: Increased work of breathing while lying flat. Obstructive breathing pattern while resting.  Clear to auscultation bilaterally without wheezing, rales, or rhonchi.  GI: Normal, active bowel sounds.  Abdomen soft, nontender, nondistended.  : Deferred.  Musculoskeletal: Moving all extremities appropriately.  Skin: No rashes or abrasions on exposed skin.  Neurologic: Unable to assess orientation.    Data   All laboratory results and other diagnostic data from the past 24 hours is available in Epic and has been personally reviewed.    Recent Labs   Lab 06/15/22  0624 06/14/22  1634   WBC 10.3 11.2*   HGB 11.3* 11.3*   MCV 95 93    283    131*   POTASSIUM 3.6 4.1   CHLORIDE 97 98   CO2 33* 30   BUN 27 33*   CR 1.16* 1.49*   ANIONGAP 6 3   SHIRA 8.2* 8.6   * 127*   ALBUMIN  --  3.1*    PROTTOTAL  --  7.2   BILITOTAL  --  0.6   ALKPHOS  --  133   ALT  --  37   AST  --  29   LIPASE  --  65*     Recent Results (from the past 24 hour(s))   XR Chest Port 1 View    Narrative    XR CHEST PORT 1 VIEW   2022 4:50 PM     HISTORY: hypoxia, chest npressure    COMPARISON: Chest radiograph 2022      Impression    IMPRESSION: Markedly enlarged cardiac silhouette, similar to prior  exam. New multifocal alveolar opacities, differential includes  multifocal pneumonia, and severe pulmonary edema. Small bilateral  pleural effusions, right larger than left. No pneumothorax. Bones are  unchanged.    JENNIFER PÉREZ MD         SYSTEM ID:  [id number]   Echocardiogram Complete   Result Value    LVEF  65-70%    Narrative    [id number]  [id number]  [id number]  [id number]^ROSEMARY^LETTY     [facility]  Echocardiography Laboratory  [address] [place] [address]     Name: SIMÓN KOCH  MRN: [id number]  : 1940  Study Date: 06/15/2022 08:11 AM  Age: 82 yrs  Gender: Female  Patient Location: [facility]  Reason For Study: CHF  Ordering Physician: LETTY RILEY  Referring Physician: Elmira Ayala  Performed By: Alison Castro RDCS     BSA: 1.5 m2  Height: 60 in  Weight: 116 lb  HR: 91  BP: 120/78 mmHg  ______________________________________________________________________________  Procedure  Complete Portable Echo Adult.  ______________________________________________________________________________  Interpretation Summary     Probably normal left ventricular systolic function. Contrast would enhance  wall motion analysis.  The visual ejection fraction is 65-70%.  Flattened septum is consistent with RV pressure overload.  The right ventricle is mildly dilated.  The right ventricular systolic function is moderately reduced.  There is mild to moderate (1-2+) tricuspid regurgitation.  Right ventricular systolic pressure is elevated, consistent with moderate  pulmonary hypertension.  Mild valvular aortic  stenosis.  The ascending aorta is Mildly dilated.  The study was technically difficult.  ______________________________________________________________________________  Left Ventricle  The left ventricle is normal in size. There is normal left ventricular wall  thickness. Diastolic Doppler findings (E/E' ratio and/or other parameters)  suggest left ventricular filling pressures are indeterminate. The visual  ejection fraction is 65-70%. Grade I or early diastolic dysfunction. Probably  normal left ventricular systolic function. Contrast would enhance wall motion  analysis. Flattened septum is consistent with RV pressure overload.     Right Ventricle  The right ventricle is mildly dilated. The right ventricular systolic function  is moderately reduced. Only the basal portion of the irght ventricle appears  to contract normally.     Atria  The left atrium is mildly dilated. The right atrium is mildly dilated. There  is no color Doppler evidence of an atrial shunt.     Mitral Valve  There is trace mitral regurgitation.     Tricuspid Valve  There is mild to moderate (1-2+) tricuspid regurgitation. The right  ventricular systolic pressure is approximated at 43mmHg plus the right atrial  pressure. IVC diameter >2.1 cm collapsing <50% with sniff suggests a high RA  pressure estimated at 15 mmHg or greater. Right ventricular systolic pressure  is elevated, consistent with moderate pulmonary hypertension.     Aortic Valve  The aortic valve is trileaflet. There is mild trileaflet aortic sclerosis. No  aortic regurgitation is present. The calculated aortic valve are is 1.9 cm^2.  The peak AoV pressure gradient is 20.0 mmHg. The mean AoV pressure gradient is  10.4 mmHg. Mild valvular aortic stenosis.     Pulmonic Valve  There is trace pulmonic valvular regurgitation.     Vessels  The aortic root is normal size. The ascending aorta is Mildly dilated.     Pericardium  There is no pericardial effusion.     Rhythm  Sinus rhythm was  noted. The patient exhibited frequent PACs.  ______________________________________________________________________________  MMode/2D Measurements & Calculations  IVSd: 0.94 cm     LVIDd: 3.8 cm  LVIDs: 2.6 cm  LVPWd: 1.0 cm  FS: 30.9 %  LV mass(C)d: 111.7 grams  LV mass(C)dI: 75.4 grams/m2  Ao root diam: 2.8 cm  LA dimension: 4.0 cm  asc Aorta Diam: 3.8 cm  LA/Ao: 1.4  LVOT diam: 1.9 cm  LVOT area: 2.7 cm2  LA Volume (BP): 56.4 ml  LA Volume Index (BP): 38.1 ml/m2  RWT: 0.54     Doppler Measurements & Calculations  MV E max satinder: 60.6 cm/sec  MV A max satinder: 113.5 cm/sec  MV E/A: 0.53  MV dec time: 0.27 sec  Ao V2 max: 225.9 cm/sec  Ao max P.0 mmHg  Ao V2 mean: 148.9 cm/sec  Ao mean PG: 10.4 mmHg  Ao V2 VTI: 36.7 cm  MANGO(I,D): 1.9 cm2  MANGO(V,D): 1.7 cm2  LV V1 max P.1 mmHg  LV V1 max: 142.4 cm/sec  LV V1 VTI: 25.7 cm  SV(LVOT): 69.9 ml  SI(LVOT): 47.2 ml/m2  PA acc time: 0.03 sec  TR max satinder: 299.2 cm/sec  TR max P.9 mmHg  AV Satinder Ratio (DI): 0.63  MANGO Index (cm2/m2): 1.3  E/E' av.1  Lateral E/e': 8.2  Medial E/e': 13.9     ______________________________________________________________________________  Report approved by: Tom Rae 06/15/2022 10:11 AM           I personally reviewed: no images or EKG's today.

## 2022-06-15 NOTE — PLAN OF CARE
Goal Outcome Evaluation:    Plan of Care Reviewed With: patient, daughter     Overall Patient Progress: no change     Blood pressure 120/66, pulse 85, temperature 98.5  F (36.9  C), temperature source Oral, resp. rate 28, weight 48.4 kg (106 lb 12.8 oz), SpO2 96 %.    Began care for patient at 2130.  Pt VSS and A&Ox3 on O2 at 4 Lpm via NC.  Disoriented to place.  Pt unable to hear  and family refused hearing assisted device for patient.  Denies CP, Nausea, and Vomiting. Heparin running at 650 units per hour.  NPO at 0015 on 06/15.  LS fine crackles.  Assist x1 with GB and Walker.  Purewick in place. SR with PVC on Tele. Continue with POC.

## 2022-06-16 ENCOUNTER — APPOINTMENT (OUTPATIENT)
Dept: CT IMAGING | Facility: CLINIC | Age: 82
DRG: 280 | End: 2022-06-16
Attending: STUDENT IN AN ORGANIZED HEALTH CARE EDUCATION/TRAINING PROGRAM
Payer: COMMERCIAL

## 2022-06-16 LAB
AMMONIA PLAS-SCNC: <10 UMOL/L (ref 10–50)
ANION GAP SERPL CALCULATED.3IONS-SCNC: 7 MMOL/L (ref 3–14)
BASE EXCESS BLDV CALC-SCNC: 11.5 MMOL/L (ref -7.7–1.9)
BASE EXCESS BLDV CALC-SCNC: 12.3 MMOL/L (ref -7.7–1.9)
BUN SERPL-MCNC: 24 MG/DL (ref 7–30)
CALCIUM SERPL-MCNC: 8 MG/DL (ref 8.5–10.1)
CHLORIDE BLD-SCNC: 94 MMOL/L (ref 94–109)
CO2 SERPL-SCNC: 34 MMOL/L (ref 20–32)
CREAT SERPL-MCNC: 1.07 MG/DL (ref 0.52–1.04)
GFR SERPL CREATININE-BSD FRML MDRD: 52 ML/MIN/1.73M2
GLUCOSE BLD-MCNC: 105 MG/DL (ref 70–99)
HCO3 BLDV-SCNC: 37 MMOL/L (ref 21–28)
HCO3 BLDV-SCNC: 38 MMOL/L (ref 21–28)
O2/TOTAL GAS SETTING VFR VENT: 4 %
O2/TOTAL GAS SETTING VFR VENT: 6 %
PCO2 BLDV: 48 MM HG (ref 40–50)
PCO2 BLDV: 50 MM HG (ref 40–50)
PH BLDV: 7.49 [PH] (ref 7.32–7.43)
PH BLDV: 7.49 [PH] (ref 7.32–7.43)
PO2 BLDV: 56 MM HG (ref 25–47)
PO2 BLDV: 60 MM HG (ref 25–47)
POTASSIUM BLD-SCNC: 3 MMOL/L (ref 3.4–5.3)
POTASSIUM BLD-SCNC: 3.7 MMOL/L (ref 3.4–5.3)
SODIUM SERPL-SCNC: 135 MMOL/L (ref 133–144)
UFH PPP CHRO-ACNC: 0.17 IU/ML

## 2022-06-16 PROCEDURE — 250N000013 HC RX MED GY IP 250 OP 250 PS 637: Performed by: STUDENT IN AN ORGANIZED HEALTH CARE EDUCATION/TRAINING PROGRAM

## 2022-06-16 PROCEDURE — 82140 ASSAY OF AMMONIA: CPT | Performed by: STUDENT IN AN ORGANIZED HEALTH CARE EDUCATION/TRAINING PROGRAM

## 2022-06-16 PROCEDURE — 82310 ASSAY OF CALCIUM: CPT | Performed by: INTERNAL MEDICINE

## 2022-06-16 PROCEDURE — 99232 SBSQ HOSP IP/OBS MODERATE 35: CPT | Mod: 25 | Performed by: INTERNAL MEDICINE

## 2022-06-16 PROCEDURE — 70450 CT HEAD/BRAIN W/O DYE: CPT

## 2022-06-16 PROCEDURE — 250N000011 HC RX IP 250 OP 636: Performed by: STUDENT IN AN ORGANIZED HEALTH CARE EDUCATION/TRAINING PROGRAM

## 2022-06-16 PROCEDURE — 36415 COLL VENOUS BLD VENIPUNCTURE: CPT | Performed by: STUDENT IN AN ORGANIZED HEALTH CARE EDUCATION/TRAINING PROGRAM

## 2022-06-16 PROCEDURE — 99232 SBSQ HOSP IP/OBS MODERATE 35: CPT | Performed by: STUDENT IN AN ORGANIZED HEALTH CARE EDUCATION/TRAINING PROGRAM

## 2022-06-16 PROCEDURE — 120N000001 HC R&B MED SURG/OB

## 2022-06-16 PROCEDURE — 82803 BLOOD GASES ANY COMBINATION: CPT | Performed by: INTERNAL MEDICINE

## 2022-06-16 PROCEDURE — 258N000003 HC RX IP 258 OP 636: Performed by: STUDENT IN AN ORGANIZED HEALTH CARE EDUCATION/TRAINING PROGRAM

## 2022-06-16 PROCEDURE — 82803 BLOOD GASES ANY COMBINATION: CPT | Performed by: STUDENT IN AN ORGANIZED HEALTH CARE EDUCATION/TRAINING PROGRAM

## 2022-06-16 PROCEDURE — 85520 HEPARIN ASSAY: CPT | Performed by: STUDENT IN AN ORGANIZED HEALTH CARE EDUCATION/TRAINING PROGRAM

## 2022-06-16 PROCEDURE — 84132 ASSAY OF SERUM POTASSIUM: CPT | Performed by: STUDENT IN AN ORGANIZED HEALTH CARE EDUCATION/TRAINING PROGRAM

## 2022-06-16 RX ORDER — POTASSIUM CHLORIDE 1500 MG/1
20 TABLET, EXTENDED RELEASE ORAL ONCE
Status: COMPLETED | OUTPATIENT
Start: 2022-06-16 | End: 2022-06-16

## 2022-06-16 RX ORDER — POTASSIUM CHLORIDE 1.5 G/1.58G
20 POWDER, FOR SOLUTION ORAL ONCE
Status: COMPLETED | OUTPATIENT
Start: 2022-06-16 | End: 2022-06-16

## 2022-06-16 RX ORDER — POTASSIUM CHLORIDE 1500 MG/1
40 TABLET, EXTENDED RELEASE ORAL ONCE
Status: COMPLETED | OUTPATIENT
Start: 2022-06-16 | End: 2022-06-16

## 2022-06-16 RX ADMIN — POTASSIUM CHLORIDE 20 MEQ: 1.5 POWDER, FOR SOLUTION ORAL at 21:34

## 2022-06-16 RX ADMIN — FUROSEMIDE 40 MG: 10 INJECTION, SOLUTION INTRAMUSCULAR; INTRAVENOUS at 10:12

## 2022-06-16 RX ADMIN — SODIUM CHLORIDE, POTASSIUM CHLORIDE, SODIUM LACTATE AND CALCIUM CHLORIDE 250 ML: 600; 310; 30; 20 INJECTION, SOLUTION INTRAVENOUS at 16:19

## 2022-06-16 RX ADMIN — AMLODIPINE BESYLATE 5 MG: 5 TABLET ORAL at 21:34

## 2022-06-16 RX ADMIN — AMLODIPINE BESYLATE 5 MG: 5 TABLET ORAL at 10:11

## 2022-06-16 RX ADMIN — ASPIRIN 81 MG: 81 TABLET, COATED ORAL at 10:11

## 2022-06-16 RX ADMIN — DULOXETINE HYDROCHLORIDE 60 MG: 60 CAPSULE, DELAYED RELEASE ORAL at 10:11

## 2022-06-16 RX ADMIN — POTASSIUM CHLORIDE 20 MEQ: 1500 TABLET, EXTENDED RELEASE ORAL at 13:12

## 2022-06-16 RX ADMIN — POTASSIUM CHLORIDE 40 MEQ: 1500 TABLET, EXTENDED RELEASE ORAL at 11:49

## 2022-06-16 ASSESSMENT — ACTIVITIES OF DAILY LIVING (ADL)
ADLS_ACUITY_SCORE: 49
ADLS_ACUITY_SCORE: 51
ADLS_ACUITY_SCORE: 49
ADLS_ACUITY_SCORE: 51
ADLS_ACUITY_SCORE: 49
ADLS_ACUITY_SCORE: 51

## 2022-06-16 NOTE — PLAN OF CARE
Temp: 98.2  F (36.8  C) Temp src: Oral BP: 127/80 Pulse: 93   Resp: 20 SpO2: 94 % O2 Device: Nasal cannula Oxygen Delivery: 4 LPM     Orientation:  oriented to people and place. Speaks Hmong, family at bed side and does interpreting. Pt speaks some English, but hard to understand due to missing dentures   VS: stable   Pain:  very mild chest pain reported. Did not wanted to take any medication, just rest. VS stable, no other symptoms, rested. Recked: no pain reported.   Tele:  SR, peaked P waives   Activity:  ambulated to bathroom to void   Resp:   O2 drop to mid 80% when ambulating on 4l. Back to 90-92% with rest on 4l   LS: crackles in lower lobes, no wheezing this shift.   GI:  2 loose BM this shift. Family stated she had diarrhea for a while now. -incontinent. WBC in range.   : Urine incontinent. Cant measure output.     Diet: pt is hungry and she wants to go home to eat her soup. Paged MD - see provider note:  Advance diet  slowly, Low sodium and 1800 ml fluid restriction. She Drank some apple juice. No issue swallowing this shift. Family brought rice soup, did not wanted to eat any.   Skin:  WDL   Lines: PIV infusing heparin at 600 Units/hr, Hep Xa recheck at 2130: 2.0 - bolus of 1500 units given over 5 min and then running new @ 750 units/hr. Recheck is scheduled for 0515.   Other:  PT is sleeping between care, hard to wake her up. During awake times, she is talkative.   Family requested a form of release of information. Printed and given to be filled out by family.   Plan:   Continue plan of care

## 2022-06-16 NOTE — PLAN OF CARE
Vital signs stable, O2: 93% on 4L NC. Denies pain, chest pain or feeling SOB. Pt disorientated to time and situation. Somnolent and lethargic for most of the shift, pt will arouse to gentle touch or her granddaughters voice. Granddaughter has been by bedside translating. ipad remains at bedside as needed for translating. Up with assist of 1 with GB and walker. Regular diet with 1800 ml fluid restriction, Pt consumed 360 ml of fluids this shift. Purewick changed and remains in place with minimal urine output. Ammonia <10. Heparin gtt discontinued. Pt was placed on high potassium protocol. K: 3.0 and was replaced, recheck scheduled for 1830. Discharge TBD.

## 2022-06-16 NOTE — PROVIDER NOTIFICATION
"Provider notified: \"Pt is more sleepy today per grandaughter. Somnolent. Arouses to voice and touch. Dis to time and situation. All other neuros intact. Do you want to recheck VBGs?\"  "

## 2022-06-16 NOTE — PLAN OF CARE
Hmoung  used for assessment. VSS. Dis to situation and time. Pt more somnolent throughout day and night. [facility] reports pt is more sleepy. MD notified, VBGs and BMP drawn this AM. Pt denies pain, CP, shortness of breath, and N/V. New PIV placed, pt removed other IV. Heparin recheck not in range, heparin now running at 900 U/hr. Purewick in place, changed and maria fernanda cares completed. Adv to regular diet. Pt has poor PO intake.

## 2022-06-16 NOTE — PROVIDER NOTIFICATION
Pagephu SAINZ 1815: PT has diarrhea, yesterday at least 3X, today 1x. No antibiotics, no stool softner, WBC in norm. Per family had diarrhea at home already.  Arrived on the 14th. Wondering if we should rule out C-diff?

## 2022-06-16 NOTE — PROGRESS NOTES
[facility]  Cardiology Progress Note      Date of Service (when I saw the patient): 06/16/2022    Summary: Errol Londono is a 82 year old ong female with history of HTN, who was admitted on 6/14/2022 with weakness and confusion.       Interval History   Tele: Sinus with PACs and short runs of PSVT      Sleeping  Granddaughter at bedside and speaks English. She says the patient was just up to the bathroom recently.   She has some mild confusion       Assessment & Plan   HFpEF  Pulmonary hypertension  - No reports of SOB or CP  - NT pro BNP 18,991  - HS trop 205  - CXR showing markedly enlarged cardiac silhouette, new multifocal alveolar opacities, small bilateral pleural effusions.  - Echo 6/15/22: EF 65-70%, Flattened septum is consistent with RV pressure overload.  The right ventricle is mildly dilated.  The right ventricular systolic function is moderately reduced.  There is mild to moderate (1-2+) tricuspid regurgitation.  Right ventricular systolic pressure is elevated, consistent with moderate  pulmonary hypertension.  - Diuresis, currently on IV Lasix 40 mg BID. Still volume up. Renal function is improving with diuresis.  Good UO, but weights only down from 11 to 110 lbs.  Continue diuresis as is. May need an increase   - K is low at 3.0, needs aggressive K replacement, ordered replacement protocol       Elevated troponin  - HS trop 205, 164  - No chest pain  - Suspect demand ischemia in setting of HFpEF exacerbation  - On heparin infusion, ?due to trop. I do not see a need for heparin gtt, so will discontinue   - Stress test at some point      HTN  - BP controlled on amlodipine 5 mg BID and IV Lasix        Smumer Urbina PA-C, PA-C      Patient Active Problem List   Diagnosis     Hypokalemia     Generalized muscle weakness     Failure to thrive in adult     New onset of congestive heart failure (H)       Physical Exam   Temp: 99  F (37.2  C) Temp src: Oral BP: 127/82 Pulse: 92   Resp:  16 SpO2: 93 % O2 Device: Nasal cannula Oxygen Delivery: 4 LPM  Vitals:    06/14/22 2137 06/15/22 0551 06/16/22 0700   Weight: 48.4 kg (106 lb 12.8 oz) 50.4 kg (111 lb 1.6 oz) 50.2 kg (110 lb 9.6 oz)     Vital Signs with Ranges  Temp:  [98.1  F (36.7  C)-99  F (37.2  C)] 99  F (37.2  C)  Pulse:  [76-97] 92  Resp:  [16-20] 16  BP: (106-127)/(66-82) 127/82  SpO2:  [92 %-94 %] 93 %  I/O last 3 completed shifts:  In: 184.3 [I.V.:184.3]  Out: 500 [Urine:500]    Constitutional: NAD.   Respiratory: Increased WOB, bibasilar rales  Cardiovascular: RRR, s1s2  GI: soft, BS+  Skin: warm, no rashes  Musculoskeletal: Moving all extremities  Neurologic: Sleeping  Neuropsychiatric: Normal affect       Data   Recent Labs   Lab 06/16/22  0510 06/15/22  0624 06/14/22  1634   WBC  --  10.3 11.2*   HGB  --  11.3* 11.3*   MCV  --  95 93   PLT  --  294 283    136 131*   POTASSIUM 3.0* 3.6 4.1   CHLORIDE 94 97 98   CO2 34* 33* 30   BUN 24 27 33*   CR 1.07* 1.16* 1.49*   ANIONGAP 7 6 3   SHIRA 8.0* 8.2* 8.6   * 119* 127*   ALBUMIN  --   --  3.1*   PROTTOTAL  --   --  7.2   BILITOTAL  --   --  0.6   ALKPHOS  --   --  133   ALT  --   --  37   AST  --   --  29   LIPASE  --   --  65*     No results found for this or any previous visit (from the past 24 hour(s)).    Medications     heparin 900 Units/hr (06/16/22 1017)     - MEDICATION INSTRUCTIONS -         amLODIPine  5 mg Oral BID     aspirin  81 mg Oral Daily     DULoxetine  60 mg Oral Daily     furosemide  40 mg Intravenous BID     sodium chloride (PF)  3 mL Intracatheter Q8H           I spent 25 minutes face-to-face or coordinating care of See Spring. Over 50% of our time on the unit was spent counseling the patient and/or coordinating care.

## 2022-06-16 NOTE — PROGRESS NOTES
[facility]    Medicine Progress Note - Hospitalist Service  Date of Admission: 6/14/2022     Assessment & Plan         See Spring is a 82 year old female with past medical history significant for generalized muscle weakness, hypokalemia, hypertension and failure to thrive who presented to [facility] on 6/14/2022 with weakness and was found to have new onset congestive heart failure.    Acute Metabolic Encephalopathy  Failure to Thrive  Currently unclear etiology. Patient more somnolent. Unable to appropriately assess orientation due to limited  resources, but family at bedside says patient not answering orientation questions appropriately. Suspect she may be dry/slightly overdiuresed with tenting of skin/wrinkles in skin. Will provide 250ml fluid to see if patient perks up. Unlikely infectious as does not have fever or significantly elevated WBC. Urinalysis on arrival did not appear infectious. Slightly low potassium, but not to a level confusion would be expected. Does not have focal deficits concerning of possible intracranial pathology, but will obtain CT head. VBG pending as well. Ammonia normal. Could be generalized weakness/decline from lack of significant oral intake. Will consult palliative care, but could consider NG if within goals of care.    New Onset Congestive Heart Failure w/ Exacerbation  Moderate Pulmonary Hypertension  Increased weakness and generalized malaise. No reports of shortness of breath or swelling, but found to have NTproBNP 18,991 with slightly elevated troponin to 205. ECG without dynamic ST changes, but with some TWI in anterior leads. TTE with diastolic dysfunction and moderate pulmonary hypertension, but with preserved ejection fraction. Started on diuresis and heparin gtt on admission.  -Cardiology consulted, appreciate recommendations  -Lasix 40mg IV BID  -Heparin gtt  -Ischemic workup per cardiology  -Does show some possible  obstructive breathing pattern, which may warrant a sleep study on discharge    Troponin Elevation  Troponin 205-->164. Suspect related to supply/demand mismatch in setting of heart failure exacerbation.    Acute Kidney Injury  Creatinine 1.49-->1.16-->1.06. Suspect related to renal congestion as improving with diuresis.    Acute Anemia  Hgb 11.3, decreased from 12.7 4/2022. Associated with decreased hematocrit consistent with hemodilution.    Hyponatremia, resolved     Diet: Orders Placed This Encounter      Regular Diet Adult   DVT Prophylaxis: heparin gtt  Davis Catheter:  Not present  Code Status: Full Code    Expected discharge: Likely 2-3 days pending adequate diuresis and clinical improvement; patient very weak and likely will need placement    The patient's care was discussed with the Bedside Nurse, Patient and Patient's Family.    Adolfo Eden MD, S  Hospitalist Service  [facility]  ______________________________________________________________________    Interval History   Nursing notes reviewed. No acute events overnight. Patient not providing much subjective besides telling granddaughter she is tired and thirsty.    Physical Exam   Vital signs:  Temp: 99  F (37.2  C) Temp src: Oral BP: 127/82 Pulse: 92   Resp: 16 SpO2: 93 % O2 Device: Nasal cannula Oxygen Delivery: 4 LPM   Weight: 50.2 kg (110 lb 9.6 oz)  Estimated body mass index is 21.6 kg/m  as calculated from the following:    Height as of 6/24/21: 1.524 m (5').    Weight as of this encounter: 50.2 kg (110 lb 9.6 oz).    General: Elderly female resting in bed.  Somnolent, but speaks to daughter in Hmong occasionally. Does not open eyes.  HEENT: Normocephalic, atraumatic.  Mucous membranes moist.  Cardiac: Regular rate and rhythm without murmur, gallop, or rub.  1+ peripheral edema.  Respiratory: Increased work of breathing while lying flat.Clear to auscultation bilaterally without wheezing, rales, or rhonchi.  GI: Normal, active  bowel sounds.  Abdomen soft, nontender, nondistended.  : Deferred.  Musculoskeletal: Moving all extremities appropriately.  Skin: No rashes or abrasions on exposed skin.  Neurologic: Unable to assess orientation.    Data   All laboratory results and other diagnostic data from the past 24 hours is available in Epic and has been personally reviewed.    Recent Labs   Lab 06/16/22  0510 06/15/22  0624 06/14/22  1634   WBC  --  10.3 11.2*   HGB  --  11.3* 11.3*   MCV  --  95 93   PLT  --  294 283    136 131*   POTASSIUM 3.0* 3.6 4.1   CHLORIDE 94 97 98   CO2 34* 33* 30   BUN 24 27 33*   CR 1.07* 1.16* 1.49*   ANIONGAP 7 6 3   SHIRA 8.0* 8.2* 8.6   * 119* 127*   ALBUMIN  --   --  3.1*   PROTTOTAL  --   --  7.2   BILITOTAL  --   --  0.6   ALKPHOS  --   --  133   ALT  --   --  37   AST  --   --  29   LIPASE  --   --  65*     No results found for this or any previous visit (from the past 24 hour(s)).  I personally reviewed: no images or EKG's today.

## 2022-06-17 ENCOUNTER — APPOINTMENT (OUTPATIENT)
Dept: GENERAL RADIOLOGY | Facility: CLINIC | Age: 82
DRG: 280 | End: 2022-06-17
Attending: STUDENT IN AN ORGANIZED HEALTH CARE EDUCATION/TRAINING PROGRAM
Payer: COMMERCIAL

## 2022-06-17 LAB
ANION GAP SERPL CALCULATED.3IONS-SCNC: 9 MMOL/L (ref 3–14)
BUN SERPL-MCNC: 24 MG/DL (ref 7–30)
C DIFF TOX B STL QL: NEGATIVE
CALCIUM SERPL-MCNC: 8.6 MG/DL (ref 8.5–10.1)
CHLORIDE BLD-SCNC: 97 MMOL/L (ref 94–109)
CO2 SERPL-SCNC: 31 MMOL/L (ref 20–32)
CREAT SERPL-MCNC: 1.01 MG/DL (ref 0.52–1.04)
ERYTHROCYTE [DISTWIDTH] IN BLOOD BY AUTOMATED COUNT: 13.1 % (ref 10–15)
GFR SERPL CREATININE-BSD FRML MDRD: 55 ML/MIN/1.73M2
GLUCOSE BLD-MCNC: 98 MG/DL (ref 70–99)
HCT VFR BLD AUTO: 40.5 % (ref 35–47)
HGB BLD-MCNC: 12.8 G/DL (ref 11.7–15.7)
LACTATE SERPL-SCNC: 1.2 MMOL/L (ref 0.7–2)
MCH RBC QN AUTO: 30.3 PG (ref 26.5–33)
MCHC RBC AUTO-ENTMCNC: 31.6 G/DL (ref 31.5–36.5)
MCV RBC AUTO: 96 FL (ref 78–100)
PLATELET # BLD AUTO: 305 10E3/UL (ref 150–450)
POTASSIUM BLD-SCNC: 3.9 MMOL/L (ref 3.4–5.3)
POTASSIUM BLD-SCNC: 3.9 MMOL/L (ref 3.4–5.3)
PROCALCITONIN SERPL-MCNC: 0.23 NG/ML
RBC # BLD AUTO: 4.22 10E6/UL (ref 3.8–5.2)
SODIUM SERPL-SCNC: 137 MMOL/L (ref 133–144)
WBC # BLD AUTO: 10.4 10E3/UL (ref 4–11)

## 2022-06-17 PROCEDURE — 82310 ASSAY OF CALCIUM: CPT | Performed by: STUDENT IN AN ORGANIZED HEALTH CARE EDUCATION/TRAINING PROGRAM

## 2022-06-17 PROCEDURE — 84145 PROCALCITONIN (PCT): CPT | Performed by: STUDENT IN AN ORGANIZED HEALTH CARE EDUCATION/TRAINING PROGRAM

## 2022-06-17 PROCEDURE — 99223 1ST HOSP IP/OBS HIGH 75: CPT | Performed by: NURSE PRACTITIONER

## 2022-06-17 PROCEDURE — 71045 X-RAY EXAM CHEST 1 VIEW: CPT

## 2022-06-17 PROCEDURE — 36415 COLL VENOUS BLD VENIPUNCTURE: CPT | Performed by: STUDENT IN AN ORGANIZED HEALTH CARE EDUCATION/TRAINING PROGRAM

## 2022-06-17 PROCEDURE — 120N000001 HC R&B MED SURG/OB

## 2022-06-17 PROCEDURE — 99232 SBSQ HOSP IP/OBS MODERATE 35: CPT | Performed by: STUDENT IN AN ORGANIZED HEALTH CARE EDUCATION/TRAINING PROGRAM

## 2022-06-17 PROCEDURE — 87493 C DIFF AMPLIFIED PROBE: CPT | Performed by: INTERNAL MEDICINE

## 2022-06-17 PROCEDURE — 250N000011 HC RX IP 250 OP 636: Performed by: STUDENT IN AN ORGANIZED HEALTH CARE EDUCATION/TRAINING PROGRAM

## 2022-06-17 PROCEDURE — 85014 HEMATOCRIT: CPT | Performed by: STUDENT IN AN ORGANIZED HEALTH CARE EDUCATION/TRAINING PROGRAM

## 2022-06-17 PROCEDURE — 84132 ASSAY OF SERUM POTASSIUM: CPT | Performed by: STUDENT IN AN ORGANIZED HEALTH CARE EDUCATION/TRAINING PROGRAM

## 2022-06-17 PROCEDURE — 250N000013 HC RX MED GY IP 250 OP 250 PS 637: Performed by: STUDENT IN AN ORGANIZED HEALTH CARE EDUCATION/TRAINING PROGRAM

## 2022-06-17 PROCEDURE — 83605 ASSAY OF LACTIC ACID: CPT | Performed by: STUDENT IN AN ORGANIZED HEALTH CARE EDUCATION/TRAINING PROGRAM

## 2022-06-17 PROCEDURE — 87040 BLOOD CULTURE FOR BACTERIA: CPT | Performed by: STUDENT IN AN ORGANIZED HEALTH CARE EDUCATION/TRAINING PROGRAM

## 2022-06-17 PROCEDURE — 99232 SBSQ HOSP IP/OBS MODERATE 35: CPT | Performed by: INTERNAL MEDICINE

## 2022-06-17 RX ADMIN — TAZOBACTAM SODIUM AND PIPERACILLIN SODIUM 2.25 G: 250; 2 INJECTION, SOLUTION INTRAVENOUS at 14:51

## 2022-06-17 RX ADMIN — TAZOBACTAM SODIUM AND PIPERACILLIN SODIUM 2.25 G: 250; 2 INJECTION, SOLUTION INTRAVENOUS at 09:13

## 2022-06-17 RX ADMIN — AMLODIPINE BESYLATE 5 MG: 5 TABLET ORAL at 09:12

## 2022-06-17 RX ADMIN — AMLODIPINE BESYLATE 5 MG: 5 TABLET ORAL at 21:31

## 2022-06-17 RX ADMIN — TAZOBACTAM SODIUM AND PIPERACILLIN SODIUM 2.25 G: 250; 2 INJECTION, SOLUTION INTRAVENOUS at 21:32

## 2022-06-17 RX ADMIN — ASPIRIN 81 MG: 81 TABLET, COATED ORAL at 09:12

## 2022-06-17 RX ADMIN — DULOXETINE HYDROCHLORIDE 60 MG: 60 CAPSULE, DELAYED RELEASE ORAL at 09:12

## 2022-06-17 ASSESSMENT — ACTIVITIES OF DAILY LIVING (ADL)
ADLS_ACUITY_SCORE: 51

## 2022-06-17 NOTE — PROGRESS NOTES
[facility]  Cardiology Progress Note      Date of Service (when I saw the patient): 06/17/2022    Summary: Errol Londono is a 82 year old ong female with history of HTN, who was admitted on 6/14/2022 with weakness and confusion.       Interval History   Tele: Sinus with PACs and short runs of PSVT      Granddaughter said she is drinking more today and last night she ate some while the daughter was here.   She is more alert today.   Having loose stools.  Sent for c diff         Assessment & Plan   HFpEF  Pulmonary hypertension  - No reports of SOB or CP  - NT pro BNP 18,991  - HS trop 205  - CXR showing markedly enlarged cardiac silhouette, new multifocal alveolar opacities, small bilateral pleural effusions.  - Echo 6/15/22: EF 65-70%, Flattened septum is consistent with RV pressure overload.  The right ventricle is mildly dilated.  The right ventricular systolic function is moderately reduced.  There is mild to moderate (1-2+) tricuspid regurgitation.  Right ventricular systolic pressure is elevated, consistent with moderate  pulmonary hypertension.  - Diuresis:  IV Lasix 40 mg BID is ordered but last dose was yesterday.  She is still volume up (but less so) and weight is unchanged  Renal function is improving.  She is not eating much and having loose stools so diuresis on hold for now  She will likely need another dose of IV lasix, but could be lowered to 20 mg      Elevated troponin  - HS trop 205, 164  - No chest pain  - Suspect demand ischemia in setting of HFpEF exacerbation  - Stress test at some point      HTN  - BP controlled on amlodipine 5 mg BID and IV Lasix        Summer Urbina PA-C, PA-C      Patient Active Problem List   Diagnosis     Hypokalemia     Generalized muscle weakness     Failure to thrive in adult     New onset of congestive heart failure (H)       Physical Exam   Temp: 98  F (36.7  C) Temp src: Oral BP: 126/78 Pulse: 74   Resp: 18 SpO2: 96 % O2 Device: Nasal  cannula Oxygen Delivery: 5 LPM  Vitals:    06/15/22 0551 06/16/22 0700 06/17/22 0552   Weight: 50.4 kg (111 lb 1.6 oz) 50.2 kg (110 lb 9.6 oz) 50.2 kg (110 lb 11.2 oz)     Vital Signs with Ranges  Temp:  [98  F (36.7  C)-99  F (37.2  C)] 98  F (36.7  C)  Pulse:  [71-95] 74  Resp:  [16-18] 18  BP: (117-130)/(72-82) 126/78  SpO2:  [95 %-97 %] 96 %  I/O last 3 completed shifts:  In: 2073 [P.O.:2070; I.V.:3]  Out: 700 [Urine:700]    Constitutional: NAD.   Respiratory: less SOB today, still bibasilar rales  Cardiovascular: RRR, s1s2, + JVD  GI: soft, BS+  Skin: warm, no rashes  Musculoskeletal: Moving all extremities  Neurologic: Sleeping        Data   Recent Labs   Lab 06/17/22  0850 06/17/22  0548 06/16/22  1839 06/16/22  0510 06/15/22  0624 06/14/22  1634   WBC 10.4  --   --   --  10.3 11.2*   HGB 12.8  --   --   --  11.3* 11.3*   MCV 96  --   --   --  95 93     --   --   --  294 283   NA  --  137  --  135 136 131*   POTASSIUM  --  3.9  3.9 3.7 3.0* 3.6 4.1   CHLORIDE  --  97  --  94 97 98   CO2  --  31  --  34* 33* 30   BUN  --  24  --  24 27 33*   CR  --  1.01  --  1.07* 1.16* 1.49*   ANIONGAP  --  9  --  7 6 3   SHIRA  --  8.6  --  8.0* 8.2* 8.6   GLC  --  98  --  105* 119* 127*   ALBUMIN  --   --   --   --   --  3.1*   PROTTOTAL  --   --   --   --   --  7.2   BILITOTAL  --   --   --   --   --  0.6   ALKPHOS  --   --   --   --   --  133   ALT  --   --   --   --   --  37   AST  --   --   --   --   --  29   LIPASE  --   --   --   --   --  65*     Recent Results (from the past 24 hour(s))   CT Head w/o Contrast    Narrative    CT OF THE HEAD WITHOUT CONTRAST 6/16/2022 8:11 PM     COMPARISON: Head CT 4/17/2022.    HISTORY: Mental status change, unknown cause.    TECHNIQUE: 5 mm thick axial CT images of the head were acquired  without IV contrast material.    FINDINGS: There is mild diffuse cerebral volume loss. There are  extensive confluent areas of decreased density in the cerebral white  matter bilaterally  that are consistent with sequela of chronic small  vessel ischemic disease. Small peripheral wedge-shaped area of chronic  encephalomalacia at the inferoposterolateral right cerebral  hemisphere, consistent with a chronic ischemic infarct, is again noted  without change.    The ventricles and basal cisterns are within normal limits in  configuration given the degree of cerebral volume loss.  There is no  midline shift. There are no extra-axial fluid collections.    No intracranial hemorrhage, mass or recent infarct.    The visualized paranasal sinuses are well-aerated. There is no  mastoiditis. There are no fractures of the visualized bones.      Impression    IMPRESSION: Diffuse cerebral volume loss and cerebral white matter  changes consistent with chronic small vessel ischemic disease. No  evidence for acute intracranial pathology. Possible small chronic  ischemic infarct in the right cerebellar hemisphere again noted.    Radiation dose for this scan was reduced using automated exposure  control, adjustment of the mA and/or kV according to patient size, or  iterative reconstruction technique.     AALIYAH JOSÉ MD         SYSTEM ID:  [id number]   XR Chest Port 1 View    Narrative    CHEST ONE VIEW  6/17/2022 8:40 AM     HISTORY: New oxygen requirements.    COMPARISON: 6/14/2022.      Impression    IMPRESSION: Increased upper lobe infiltrates compared to previous.  Similar left base infiltrate. Minimal improvement in right base  infiltrate.     NAWAF FOSTER MD         SYSTEM ID:  [id number]       Medications     - MEDICATION INSTRUCTIONS -         amLODIPine  5 mg Oral BID     aspirin  81 mg Oral Daily     DULoxetine  60 mg Oral Daily     [Held by provider] furosemide  40 mg Intravenous BID     piperacillin-tazobactam  2.25 g Intravenous Q6H     sodium chloride (PF)  3 mL Intracatheter Q8H           I spent 20 minutes face-to-face or coordinating care of See Spring. Over 50% of our time on the unit was spent  counseling the patient and/or coordinating care.

## 2022-06-17 NOTE — PROGRESS NOTES
"CLINICAL NUTRITION SERVICES  -  ASSESSMENT NOTE    Recommendations Ordered by Registered Dietitian (RD):   Continue diet as ordered   Ordered Ensure Enlive trial + ok to order PRN    Malnutrition:   % Weight Loss:  Up to 20% in 1 year (moderate malnutrition)  % Intake:  </= 50% for >/= 5 days (severe malnutrition)  Subcutaneous Fat Loss: Unable to observe --> not appropriate, pt somnolent   Muscle Loss: Unable to observe --> not appropriate, pt somnolent   Fluid Retention:  None noted    Malnutrition Diagnosis: Moderate malnutrition  In Context of:  Acute illness or injury  Chronic illness or disease     REASON FOR ASSESSMENT  See Spring is a 82 year old female seen by Registered Dietitian for Admission Nutrition Risk Screen for positive    NUTRITION HISTORY  - Information obtained from patient's granddaughter and chart, of note patient requires [facility] [place]    - History of generalized muscle weakness, hypokalemia, hypertension, failure to thrive in adult  - Patient admitted weakness and new onset of congestive heart failure (H)  - Typical food/fluid intake PTA: pt's granddaughter notes that the patient has been eating much less over the past 2 days PTA as well as during admission. Notes that she was only eating bites of foods (rice soup, soda, some chicken). Typically when feeling well she \"eats very well\" per the granddaughter.   - Supplements: none   - Chewing/swallowing difficulty: pt with many missing teeth   - Food allergies: NKFA    CURRENT NUTRITION ORDERS  Diet Order:     Regular Diet + 1800 mL fluid restriction     Current Intake/Tolerance:  Limited time frame to comment on as patient was NPO until 6/16. No meals ordered since diet advancement. Pt consuming <50% of nutritional needs during admission.     NUTRITION FOCUSED PHYSICAL ASSESSMENT FOR DIAGNOSING MALNUTRITION)  No - not appropriate at this time, pt somnolent     Obtained from Chart/Interdisciplinary Team:  - cardiology following "     ANTHROPOMETRICS  Height: 5' --> taken on 6/24/2021  Weight: 50.2 kg ( 110 lbs 11.2 oz)   Body mass index is 21.62 kg/m .  Weight Status:  Normal BMI  Weight History: weight loss of 11 kg (18%) over the past ~ 1 year and 2 kg (4%) over the past 2 months   Wt Readings from Last 10 Encounters:   06/17/22 50.2 kg (110 lb 11.2 oz)   04/17/22 52.2 kg (115 lb)   06/24/21 61.2 kg (135 lb)     LABS  Labs reviewed    Labs:  Electrolytes  Potassium (mmol/L)   Date Value   06/17/2022 3.9   06/17/2022 3.9   06/16/2022 3.7   06/24/2021 5.0     Phosphorus (mg/dL)   Date Value   04/19/2022 3.0   04/18/2022 2.4 (L)   04/17/2022 3.0    Blood Glucose  Glucose (mg/dL)   Date Value   06/17/2022 98   06/16/2022 105 (H)   06/15/2022 119 (H)   06/14/2022 127 (H)   04/19/2022 99   06/24/2021 149 (H)     Hemoglobin A1C (%)   Date Value   04/17/2022 5.5    Inflammatory Markers  WBC (10e9/L)   Date Value   06/24/2021 7.5     WBC Count (10e3/uL)   Date Value   06/17/2022 10.4   06/15/2022 10.3   06/14/2022 11.2 (H)     Albumin (g/dL)   Date Value   06/14/2022 3.1 (L)   04/17/2022 3.5   02/22/2021 3.4 (L)      Magnesium (mg/dL)   Date Value   04/19/2022 1.7 (L)   04/18/2022 2.0   04/17/2022 2.2     Sodium (mmol/L)   Date Value   06/17/2022 137   06/16/2022 135   06/15/2022 136   06/24/2021 139    Renal  Urea Nitrogen (mg/dL)   Date Value   06/17/2022 24   06/16/2022 24   06/15/2022 27   06/24/2021 34 (H)     Creatinine (mg/dL)   Date Value   06/17/2022 1.01   06/16/2022 1.07 (H)   06/15/2022 1.16 (H)   06/24/2021 0.95     Additional  Triglycerides (mg/dL)   Date Value   06/25/2021 262 (H)   02/22/2021 53   11/15/2019 77     Ketones Urine (mg/dL)   Date Value   06/14/2022 Negative   06/24/2021 Negative        MEDICATIONS  Medications reviewed    amLODIPine  5 mg Oral BID     aspirin  81 mg Oral Daily     DULoxetine  60 mg Oral Daily     [Held by provider] furosemide  40 mg Intravenous BID     piperacillin-tazobactam  2.25 g Intravenous Q6H      sodium chloride (PF)  3 mL Intracatheter Q8H        - MEDICATION INSTRUCTIONS -        lidocaine 4%, lidocaine (buffered or not buffered), melatonin, ondansetron **OR** ondansetron, - MEDICATION INSTRUCTIONS -, sodium chloride (PF)     ASSESSED NUTRITION NEEDS PER APPROVED PRACTICE GUIDELINES:    Dosing Weight 50.2 kg   Estimated Energy Needs: 4198-7329 kcals (25-30 Kcal/Kg)  Justification: maintenance  Estimated Protein Needs: 60-75 grams protein (1.2-1.5 g pro/Kg)  Justification: preservation of lean body mass  Estimated Fluid Needs: per MD     MALNUTRITION:  % Weight Loss:  Up to 20% in 1 year (moderate malnutrition)  % Intake:  </= 50% for >/= 5 days (severe malnutrition)  Subcutaneous Fat Loss: Unable to observe --> not appropriate, pt somnolent   Muscle Loss: Unable to observe --> not appropriate, pt somnolent   Fluid Retention:  None noted    Malnutrition Diagnosis: Moderate malnutrition  In Context of:  Acute illness or injury  Chronic illness or disease    NUTRITION DIAGNOSIS:  Inadequate oral intake related to mentation and decreased appetite as evidenced by pt likely consuming <50% of nutritional needs over the past 5 days     NUTRITION INTERVENTIONS  Recommendations / Nutrition Prescription  Continue diet as ordered   Ordered Ensure Enlive trial + ok to order PRN     Implementation  Nutrition education: Provided education on the different oral nutritional supplements offered + indications for use. Answered questions pertaining to FT.  Medical Food Supplement: as above  Collaboration and Referral of Nutrition care: discussed patient during interdisciplinary rounds this morning     Nutrition Goals  Pt to consume >/=75% of meals or oral nutritional supplements ordered TID     MONITORING AND EVALUATION:  Progress towards goals will be monitored and evaluated per protocol and Practice Guidelines    Kriss Sheets RD, LD  Clinical Dietitian      3rd floor/ICU: [phone]  All other floors:  [phone]  Weekend/holiday: [phone]  Office: [phone]

## 2022-06-17 NOTE — PLAN OF CARE
Heart Failure Care Map  GOALS TO BE MET BEFORE DISCHARGE:    1. Decrease congestion and/or edema with diuretic therapy to achieve near optimal volume status.     Dyspnea improved: No, pt on 3L NC, KEYES    Edema improved: Yes, satisfactory for discharge.        Net I/O and Weights since admission:   05/18 0700 - 06/17 0659  In: 2257.3 [P.O.:2070; I.V.:187.3]  Out: 2275 [Urine:2275]  Net: -17.7     Vitals:    06/14/22 1813 06/14/22 2137 06/15/22 0551 06/16/22 0700   Weight: 52.6 kg (116 lb) 48.4 kg (106 lb 12.8 oz) 50.4 kg (111 lb 1.6 oz) 50.2 kg (110 lb 9.6 oz)    06/17/22 0552   Weight: 50.2 kg (110 lb 11.2 oz)       2.  O2 sats > 90% on room air, or at prior home O2 therapy level.      Able to wean O2 this shift to keep sats above 90%?: No, Pt weaned to 3L NC    Does patient use Home O2? No          Current oxygenation status:   SpO2: 92% on 3L NC       3.  Tolerates ambulation and mobility near baseline.     Ambulation: Yes, satisfactory for discharge.   Times patient ambulated with staff this shift: 0. Uses walker at baseline  Please review the Heart Failure Care Map for additional HF goal outcomes.    VSS. Disoriented to time. Pt somnolent but arouses to voice. Weaned to 3L NC sating at 92%. KEYES. LS fine crackles. Tele SR with peaked P wave. Pt having loose stools, none this shift, hat and sample cup in bathroom ready for sample. CT had completed last night, not resulted yet. K protocol, recheck in AM. Encourage PO intake, pt may qualify for NG tube since pt is not eating. Pts family is NOT allowed to stay overnight per charge nurse.     Tania Michael, RN  6/17/2022

## 2022-06-17 NOTE — PLAN OF CARE
Vital signs stable, O2: 96% on 4L NC. Denies pain, chest pain or feeling SOB. Pt disorientated to time and situation. Somnolent and lethargic for most of the shift, pt will arouse to gentle touch or her family members voice. Family has been by bedside since late morning. ipad remains in the room and at the bedside to be used as needed for translating. Up with assist of 1 with GB and walker. Regular diet with 1800 ml fluid restriction, Pt consumed 243 ml of fluids this shift. Purewick remains in place with minimal urine output. New order for Zosyn infusion q6. Chest x-ray obtained. Lactic acid: 1.2. On high potassium protocol. K: 3.9 and recheck scheduled for tomorrow morning. Discharge TBD.

## 2022-06-17 NOTE — PROGRESS NOTES
[facility]    Medicine Progress Note - Hospitalist Service  Date of Admission: 6/14/2022     Assessment & Plan         See Spring is a 82 year old female with past medical history significant for generalized muscle weakness, hypokalemia, hypertension and failure to thrive who presented to [facility] on 6/14/2022 with weakness and was found to have new onset congestive heart failure.    Acute Metabolic Encephalopathy  Failure to Thrive  Currently unclear etiology. Patient more somnolent. Unable to appropriately assess orientation due to limited  resources, but family at bedside says patient not answering orientation questions appropriately. Suspect she may be dry/slightly overdiuresed with tenting of skin/wrinkles in skin. Held additional diuresis afternoon of 6/16/2022 with slight increased level of alertness. Patient still confused about situation, however. Repeating CXR with increased opacities concerning for possible underlying infectious source of encephalopathy. Started antibiotics as below. Does not have focal deficits concerning of possible intracranial pathology; CT head negative for acute findings. VBG pending as well. Ammonia normal. Patient not eating much and appears frail. Palliative care assisting with goals of care. Will hold NG tube discussion until Monday to see if patient's intake and mental status improve with antibiotics over the weekend.    Acute Hypoxic Respiratory Failure  Probable Community-acquired vs Aspiration Pneumonia  Oxygen requirements to 4-5L this morning. With new somnolence repeat CXR showing worsening bilateral opacities; originally suspected to be edema, but worsening despite diuresis. BCx pending. Starting Zosyn for concern of CAP vs aspiration in setting of somnolence.  -Zosyn (renally adjusted dose) Q6H    Diarrhea  Noted to have frequent diarrhea per nursing, but incontinent. Cdiff ordered, but not collected.    New Onset  Congestive Heart Failure w/ Exacerbation  Moderate Pulmonary Hypertension  Increased weakness and generalized malaise. No reports of shortness of breath or swelling, but found to have NTproBNP 18,991 with slightly elevated troponin to 205. ECG without dynamic ST changes, but with some TWI in anterior leads. TTE with diastolic dysfunction and moderate pulmonary hypertension, but with preserved ejection fraction. Started on diuresis and heparin gtt on admission.  -Cardiology consulted, appreciate recommendations  -Holding additional diuresis as patient appeared dry afternoon 6/16/2022 and with new infection  -Heparin gtt  -Ischemic workup per cardiology  -Does show some possible obstructive breathing pattern, which may warrant a sleep study on discharge    Troponin Elevation  Troponin 205-->164. Suspect related to supply/demand mismatch in setting of heart failure exacerbation.    Acute Kidney Injury  Creatinine 1.49-->1.16-->1.06. Suspect related to renal congestion as improving with diuresis.    Acute Anemia  Hgb 11.3, decreased from 12.7 4/2022. Associated with decreased hematocrit consistent with hemodilution.    Hyponatremia, resolved     Diet: Orders Placed This Encounter      Regular Diet Adult   DVT Prophylaxis: heparin gtt  Davis Catheter:  Not present  Code Status: Full Code    Expected discharge: Likely 2-3 days pending adequate diuresis and clinical improvement; patient very weak and likely will need placement    The patient's care was discussed with the Bedside Nurse, Patient and Patient's Family.    Adolfo Eden MD, S  Hospitalist Service  [facility]  ______________________________________________________________________    Interval History   Nursing notes reviewed. No acute events overnight. Patient not providing much subjective besides telling granddaughter she wants her clothes. Granddaughter at bedside who stated patient appears to be more alert and interactive today, but still  seems confused.    Physical Exam   Vital signs:  Temp: 98  F (36.7  C) Temp src: Oral BP: 126/78 Pulse: 74   Resp: 18 SpO2: 96 % O2 Device: Nasal cannula Oxygen Delivery: 5 LPM   Weight: 50.2 kg (110 lb 11.2 oz)  Estimated body mass index is 21.62 kg/m  as calculated from the following:    Height as of 6/24/21: 1.524 m (5').    Weight as of this encounter: 50.2 kg (110 lb 11.2 oz).    General: Elderly female resting in bed.  Awake. Minimally interactive with granddaughter.  HEENT: Normocephalic, atraumatic.   Cardiac: Regular rate and rhythm without murmur, gallop, or rub.  1+ peripheral edema.  Respiratory: Increased work of breathing while lying flat. Bilateral mid-to-upper crackles.  GI: Normal, active bowel sounds.  Abdomen soft, nontender, nondistended.  : Deferred.  Musculoskeletal: Moving all extremities appropriately.  Skin: No rashes or abrasions on exposed skin.  Neurologic: Unable to assess orientation. CN II thought XII grossly intact.    Data   All laboratory results and other diagnostic data from the past 24 hours is available in Epic and has been personally reviewed.    Recent Labs   Lab 06/17/22  0850 06/17/22  0548 06/16/22  1839 06/16/22  0510 06/15/22  0624 06/14/22  1634   WBC 10.4  --   --   --  10.3 11.2*   HGB 12.8  --   --   --  11.3* 11.3*   MCV 96  --   --   --  95 93     --   --   --  294 283   NA  --  137  --  135 136 131*   POTASSIUM  --  3.9  3.9 3.7 3.0* 3.6 4.1   CHLORIDE  --  97  --  94 97 98   CO2  --  31  --  34* 33* 30   BUN  --  24  --  24 27 33*   CR  --  1.01  --  1.07* 1.16* 1.49*   ANIONGAP  --  9  --  7 6 3   SHIRA  --  8.6  --  8.0* 8.2* 8.6   GLC  --  98  --  105* 119* 127*   ALBUMIN  --   --   --   --   --  3.1*   PROTTOTAL  --   --   --   --   --  7.2   BILITOTAL  --   --   --   --   --  0.6   ALKPHOS  --   --   --   --   --  133   ALT  --   --   --   --   --  37   AST  --   --   --   --   --  29   LIPASE  --   --   --   --   --  65*     Recent Results (from the  past 24 hour(s))   CT Head w/o Contrast    Narrative    CT OF THE HEAD WITHOUT CONTRAST 6/16/2022 8:11 PM     COMPARISON: Head CT 4/17/2022.    HISTORY: Mental status change, unknown cause.    TECHNIQUE: 5 mm thick axial CT images of the head were acquired  without IV contrast material.    FINDINGS: There is mild diffuse cerebral volume loss. There are  extensive confluent areas of decreased density in the cerebral white  matter bilaterally that are consistent with sequela of chronic small  vessel ischemic disease. Small peripheral wedge-shaped area of chronic  encephalomalacia at the inferoposterolateral right cerebral  hemisphere, consistent with a chronic ischemic infarct, is again noted  without change.    The ventricles and basal cisterns are within normal limits in  configuration given the degree of cerebral volume loss.  There is no  midline shift. There are no extra-axial fluid collections.    No intracranial hemorrhage, mass or recent infarct.    The visualized paranasal sinuses are well-aerated. There is no  mastoiditis. There are no fractures of the visualized bones.      Impression    IMPRESSION: Diffuse cerebral volume loss and cerebral white matter  changes consistent with chronic small vessel ischemic disease. No  evidence for acute intracranial pathology. Possible small chronic  ischemic infarct in the right cerebellar hemisphere again noted.    Radiation dose for this scan was reduced using automated exposure  control, adjustment of the mA and/or kV according to patient size, or  iterative reconstruction technique.     AALIYAH JOSÉ MD         SYSTEM ID:  [id number]   XR Chest Port 1 View    Narrative    CHEST ONE VIEW  6/17/2022 8:40 AM     HISTORY: New oxygen requirements.    COMPARISON: 6/14/2022.      Impression    IMPRESSION: Increased upper lobe infiltrates compared to previous.  Similar left base infiltrate. Minimal improvement in right base  infiltrate.     NAWAF FOSTER MD         SYSTEM  ID:  [id number]     I personally reviewed: no images or EKG's today.

## 2022-06-17 NOTE — CONSULTS
[facility]  Palliative Care Consultation   Text Page    Assessment & Plan   See Spring is a 82 year old female who was admitted on 6/14/2022.   Consulted by Dr. Eden to assist with goals of care and development of plan of care     Recommendations:  1. Goals of Care- Full Code  Hospitalization goals discussed with patient's granddaughter at the bedside.  Goal to continue current plan of care and wait for improvement of mental status.    Decisional Capacity- Unreliable. Patient does not have an advance directive. Per  informed consent policy next of kin should be involved in all consent and decision making.  Next of kin includes daughter Tawny Ornelas.   POLST - none on file.  - Daughter Tawny to be at the bedside after 4 pm    2. Encephalopathy  Decreased level of consciousness on arrival. Multifactorial, progressive decline/FTT and metabolic abnormalities.  - work up per primary team - antibiotics, fluids, serial labs    3. Dysphagia  Baseline decreased PO intake.  Per family report, difficulty with swallowing, coughing, vomiting and choking episodes at home.    - SLP consult once mental status clears, appreciate recommendations.  - NPO    4. Malnutrition, severe Protein calorie.  5 pound weight loss in the past 2 months - fluid retention may be leading to misrepresented weight trend.  Given findings, would expect greater weight loss when patient at dry weight.  Difficulty with PO intake.  Global muscle wasting on assessment.  - dietician consult when plan determined, appreciate recommendations  - if unsafe to swallow, may benefit from NGT and tube feeds if in line with patient's plan of care.    5. Weakness  Patient with decline in mobility at home.  Granddaughter working as PCA for patient at home.  - Recommend PT/OT evaluation when mental status improved.    6. Spiritual Care  Oriented to Spiritual Health as part of Palliative Care team.  Spiritual Background: not designated    7. Care  Planning  Appreciate Care of multidisciplinary team.  Medications for discharge - plan pending.    Medical Decision Making and Goals of Care:  Discussed on June 17, 2022 with PRISCILA Leigh CNP:   Met with patient and granddaughter at the bedside.  See was not responsive to assessment, lying in bed, calm.  Reviewed medical status with granddaughter.  She outlined the decline that they have seen at home.  Reviewed that See lives with daughter, son-in-law and four grandchildren.  Granddaughter states that the difficulty with PO was their biggest concern and that at times she would vomit the food that she ate.  She reflected that her grandmother was stoic and would not discuss her health for fear of being a burden.  States that she has worked for her grandmother as her PCA for more than 1 year and that the decline has been noticeable.    Inquired about Tawny and if she were going to visit today, granddaughter stated it would be at 4 pm following her work day.      Discussed family's previous conversations regarding See's health status and goals for care.  Granddaughter stated that they have had conversations and that they hope for See to be able to discuss her wishes directly.  Reflected that at this time, her mental status was such that we would need to defer to Tawny for decisions and care planning.  Discussed the ability to wait for mental status improvement in the setting of presumed infection and antibiotic initiation.  Granddaughter verbalized understanding.    Discussed nutritional status and recommendation for tube feeding if mental status does not improve and if it would be in line with See's wishes or goals.  Granddaughter verbalized understanding of this recommendation and will bring that to her mother for consideration.     Contact information of palliative care provided and suggested meeting on Monday for further goals of care discussion.      Thank you for involving us in the patient's care.      PRISCILA Leigh CNP  Pain Management and Palliative Care  [facility]  Pgr: [phone]    Time Spent on this Encounter   Total unit/floor time 75 minutes, time consisted of the following, examination of the patient, reviewing the record and completing documentation. >50% of time spent in counseling and coordination of care, Bedside Nurse Rosio and Hospitalist Dr. Eden.  Time spend counseling with patient and family consisted of the following topics, goals of care, medical decision making regarding tube feeding, education about prognosis, care planning for discharge and symptom management.    Understanding of disease process:   This has been discussed with patient and family at the bedside.    History of Present Illness   History is obtained from the patient, electronic health record and patient's family     See Spring is a 82 year old female with a past medical history of generalized weakness, hypokalemia, hypertension, failure to thrive, dysphagia/decreased PO, who presents with weakness.  She presented to the ED and work up revealed new heart failure, NTproBNP >18,000, troponin elevated.  TTE revealed diastolic dysfunction, pulmonary hypertension, preserved EF%.  She has been treated with diuresis and heparin.  Cardiology consult placed.     This is in the setting of chronic failure to thrive with history of decreased PO intake, difficulty with swallowing, decreased mobility at home.  She has been hospitalized once in the past 2 months for recurrent weakness and decreased oral intake. There is not reported or documented decline in patient's function.  There is a documented unitentional weight loss of > 5 lbs (> 4 %) over the past 1 month.      Past Medical History   I have reviewed this patient's medical history and updated it with pertinent information if needed.   History reviewed. No pertinent past medical history.    Past Surgical History   I have reviewed this patient's  surgical history and updated it with pertinent information if needed.  Past Surgical History:   Procedure Laterality Date     IR BILIARY TUBE CHANGE  5/26/2011     IR TRANSCATHETER BIOPSY  5/26/2011       Prior to Admission Medications   Prior to Admission Medications   Prescriptions Last Dose Informant Patient Reported? Taking?   DULoxetine (CYMBALTA) 60 MG capsule 6/14/2022 at AM Daughter Yes Yes   Sig: Take 60 mg by mouth daily   MAGNESIUM PO 6/14/2022 at AM  Yes Yes   Sig: Take 1 tablet by mouth daily   acetaminophen-codeine (TYLENOL #3) 300-30 MG tablet 6/14/2022 at AM Daughter Yes Yes   Sig: Take 1 tablet by mouth every 6 hours as needed for severe pain   amLODIPine (NORVASC) 5 MG tablet 6/14/2022 at PM Daughter Yes Yes   Sig: Take 5 mg by mouth 2 times daily   atorvastatin (LIPITOR) 10 MG tablet 6/14/2022 at AM  Yes Yes   Sig: Take 10 mg by mouth daily   calcium carbonate (TUMS) 500 MG chewable tablet 6/14/2022 at AM  Yes Yes   Sig: Take 2 chew tab by mouth daily   cholecalciferol (VITAMIN D3) 125 mcg (5000 units) capsule 6/14/2022 at AM Daughter Yes Yes   Sig: Take 125 mcg by mouth daily   furosemide (LASIX) 20 MG tablet 6/14/2022 at AM  Yes Yes   Sig: Take 20 mg by mouth daily   potassium phosphate, monobasic, (K-PHOS) 500 MG tablet 6/14/2022 at Unknown time  Yes Yes   Sig: Take 500 mg by mouth daily      Facility-Administered Medications: None     Allergies   Allergies   Allergen Reactions     Gabapentin Unknown       Social History   I have updated and reviewed the following Social History Narrative:   Social History     Social History Narrative     Not on file           Living situation: home with daughter, son in law, 4 adult grandchildren       Support system: family listed above       Actual/Potential Caregiver: family       Functional status: independent for mobility, assist with ADLs  History of substance use/abuse: no    Family History   I have reviewed this patient's family history and updated it  with pertinent information if needed.   Family History   Problem Relation Age of Onset     No Known Problems Mother      No Known Problems Father        Review of Systems   The 10 point Review of Systems is negative other than noted in the HPI or here.     Physical Exam   Temp:  [98  F (36.7  C)-99  F (37.2  C)] 98  F (36.7  C)  Pulse:  [71-95] 74  Resp:  [16-18] 18  BP: (117-130)/(72-82) 126/78  SpO2:  [95 %-97 %] 96 %  110 lbs 11.2 oz  Exam:  GEN: lethargic, does not open eyes for assessment, mumbled incomprehensible speech.  HEENT:  Normocephalic/atraumatic, no scleral icterus, no nasal discharge, mouth moist.  CV:  RRR, S1, S2; no murmurs or other irregularities noted.  +3 DP/PT pulses bilatererally; no edema BLE.  RESP:  Coarse to auscultation bilaterally.  Symmetric chest rise on inhalation noted.  Normal respiratory effort.  ABD:  Rounded, soft, non-tender/non-distended.  +BS  EXT:  Edema & pulses as noted above.  CMS intact x 4.     SKIN:  Dry to touch, no exanthems noted in the visualized areas.    NEURO: Not responsive, moves extremities x 4 spontaneously  PAIN BEHAVIOR: NAD  Psych:  PIPE    Delirium Screen/CAM:  Delirium = (#1 and #2 = YES) + (#3 and/or #4)   1) Acute onset and fluctuating course:   YES   (acute change in mental status from baseline over last 24 hours)  2) Inattention:   YES   (difficulty focusing, distractible, can't follow conversation)  3) Disorganized thinking:   YES   (score only if #1 and #2 are YES)  (rambling/irrelevant conversation, unclear/illogical thoughts, inconsistency)  4) Altered level of consciousness:   YES   (score only if #1 and #2 are YES)  (other than alert, calm, cooperative)    Delirium/CAM score: 4/4  Interpretation:  1)  Delirium:  Present  2)  Type:  hypoactive  3)  Severity:  moderate    Data   Results for orders placed or performed during the hospital encounter of 06/14/22 (from the past 24 hour(s))   Ammonia   Result Value Ref Range    Ammonia <10 (L) 10 - 50  umol/L   Blood gas venous   Result Value Ref Range    pH Venous 7.49 (H) 7.32 - 7.43    pCO2 Venous 48 40 - 50 mm Hg    pO2 Venous 60 (H) 25 - 47 mm Hg    Bicarbonate Venous 37 (H) 21 - 28 mmol/L    Base Excess/Deficit (+/-) 11.5 (H) -7.7 - 1.9 mmol/L    FIO2 6    Potassium   Result Value Ref Range    Potassium 3.7 3.4 - 5.3 mmol/L   CT Head w/o Contrast    Narrative    CT OF THE HEAD WITHOUT CONTRAST 6/16/2022 8:11 PM     COMPARISON: Head CT 4/17/2022.    HISTORY: Mental status change, unknown cause.    TECHNIQUE: 5 mm thick axial CT images of the head were acquired  without IV contrast material.    FINDINGS: There is mild diffuse cerebral volume loss. There are  extensive confluent areas of decreased density in the cerebral white  matter bilaterally that are consistent with sequela of chronic small  vessel ischemic disease. Small peripheral wedge-shaped area of chronic  encephalomalacia at the inferoposterolateral right cerebral  hemisphere, consistent with a chronic ischemic infarct, is again noted  without change.    The ventricles and basal cisterns are within normal limits in  configuration given the degree of cerebral volume loss.  There is no  midline shift. There are no extra-axial fluid collections.    No intracranial hemorrhage, mass or recent infarct.    The visualized paranasal sinuses are well-aerated. There is no  mastoiditis. There are no fractures of the visualized bones.      Impression    IMPRESSION: Diffuse cerebral volume loss and cerebral white matter  changes consistent with chronic small vessel ischemic disease. No  evidence for acute intracranial pathology. Possible small chronic  ischemic infarct in the right cerebellar hemisphere again noted.    Radiation dose for this scan was reduced using automated exposure  control, adjustment of the mA and/or kV according to patient size, or  iterative reconstruction technique.     AALIYAH JOSÉ MD         SYSTEM ID:  [id number]   Potassium   Result  Value Ref Range    Potassium 3.9 3.4 - 5.3 mmol/L   Basic metabolic panel   Result Value Ref Range    Sodium 137 133 - 144 mmol/L    Potassium 3.9 3.4 - 5.3 mmol/L    Chloride 97 94 - 109 mmol/L    Carbon Dioxide (CO2) 31 20 - 32 mmol/L    Anion Gap 9 3 - 14 mmol/L    Urea Nitrogen 24 7 - 30 mg/dL    Creatinine 1.01 0.52 - 1.04 mg/dL    Calcium 8.6 8.5 - 10.1 mg/dL    Glucose 98 70 - 99 mg/dL    GFR Estimate 55 (L) >60 mL/min/1.73m2   XR Chest Port 1 View    Narrative    CHEST ONE VIEW  6/17/2022 8:40 AM     HISTORY: New oxygen requirements.    COMPARISON: 6/14/2022.      Impression    IMPRESSION: Increased upper lobe infiltrates compared to previous.  Similar left base infiltrate. Minimal improvement in right base  infiltrate.     NAWAF FOSTER MD         SYSTEM ID:  [id number]   CBC with platelets   Result Value Ref Range    WBC Count 10.4 4.0 - 11.0 10e3/uL    RBC Count 4.22 3.80 - 5.20 10e6/uL    Hemoglobin 12.8 11.7 - 15.7 g/dL    Hematocrit 40.5 35.0 - 47.0 %    MCV 96 78 - 100 fL    MCH 30.3 26.5 - 33.0 pg    MCHC 31.6 31.5 - 36.5 g/dL    RDW 13.1 10.0 - 15.0 %    Platelet Count 305 150 - 450 10e3/uL   Lactic acid whole blood   Result Value Ref Range    Lactic Acid 1.2 0.7 - 2.0 mmol/L

## 2022-06-17 NOTE — PLAN OF CARE
Temp: 99  F (37.2  C) Temp src: Oral BP: 126/82 Pulse: 95   Resp: 16 SpO2: 95 % O2 Device: Nasal cannula Oxygen Delivery: 6 LPM     Orientation:  to place and person. Family is at bedside, does the translation   VS: stable   Pain:  denies   Tele:  SR with peaked P waives and occational PVC's   Activity:  ambulates to bathroom   Resp:   on 6l , LS crackles, No SOB this shift   GI:  diarrhea, paged MD to rule out C-diff.   : Purewick on, but uses bathroom too   Notable Labs:  K protocol: K3.0, replacement given during day shift, recheck at 1830: 3.7, another replacement given, recheck tomorrow morning   Lines:  250ml lacted ringer Bolus over 3h given per order   Other:  Head CT tonight, no report yet.   Plan:   Continue with plan of care

## 2022-06-18 ENCOUNTER — APPOINTMENT (OUTPATIENT)
Dept: CT IMAGING | Facility: CLINIC | Age: 82
DRG: 280 | End: 2022-06-18
Attending: STUDENT IN AN ORGANIZED HEALTH CARE EDUCATION/TRAINING PROGRAM
Payer: COMMERCIAL

## 2022-06-18 LAB
ALBUMIN UR-MCNC: NEGATIVE MG/DL
APPEARANCE UR: CLEAR
BACTERIA #/AREA URNS HPF: ABNORMAL /HPF
BILIRUB UR QL STRIP: NEGATIVE
COLOR UR AUTO: ABNORMAL
GLUCOSE UR STRIP-MCNC: NEGATIVE MG/DL
HGB UR QL STRIP: NEGATIVE
KETONES UR STRIP-MCNC: NEGATIVE MG/DL
L PNEUMO1 AG UR QL IA: NEGATIVE
LEUKOCYTE ESTERASE UR QL STRIP: ABNORMAL
MUCOUS THREADS #/AREA URNS LPF: PRESENT /LPF
NITRATE UR QL: NEGATIVE
PH UR STRIP: 6.5 [PH] (ref 5–7)
POTASSIUM BLD-SCNC: 3.4 MMOL/L (ref 3.4–5.3)
POTASSIUM BLD-SCNC: 3.9 MMOL/L (ref 3.4–5.3)
RBC URINE: 2 /HPF
SP GR UR STRIP: 1.02 (ref 1–1.03)
SQUAMOUS EPITHELIAL: <1 /HPF
UROBILINOGEN UR STRIP-MCNC: NORMAL MG/DL
WBC URINE: 1 /HPF

## 2022-06-18 PROCEDURE — 94640 AIRWAY INHALATION TREATMENT: CPT

## 2022-06-18 PROCEDURE — 87899 AGENT NOS ASSAY W/OPTIC: CPT | Performed by: STUDENT IN AN ORGANIZED HEALTH CARE EDUCATION/TRAINING PROGRAM

## 2022-06-18 PROCEDURE — 120N000001 HC R&B MED SURG/OB

## 2022-06-18 PROCEDURE — 250N000009 HC RX 250: Performed by: STUDENT IN AN ORGANIZED HEALTH CARE EDUCATION/TRAINING PROGRAM

## 2022-06-18 PROCEDURE — 250N000011 HC RX IP 250 OP 636: Performed by: STUDENT IN AN ORGANIZED HEALTH CARE EDUCATION/TRAINING PROGRAM

## 2022-06-18 PROCEDURE — 250N000012 HC RX MED GY IP 250 OP 636 PS 637: Performed by: STUDENT IN AN ORGANIZED HEALTH CARE EDUCATION/TRAINING PROGRAM

## 2022-06-18 PROCEDURE — 87086 URINE CULTURE/COLONY COUNT: CPT | Performed by: STUDENT IN AN ORGANIZED HEALTH CARE EDUCATION/TRAINING PROGRAM

## 2022-06-18 PROCEDURE — 36415 COLL VENOUS BLD VENIPUNCTURE: CPT | Performed by: STUDENT IN AN ORGANIZED HEALTH CARE EDUCATION/TRAINING PROGRAM

## 2022-06-18 PROCEDURE — 250N000013 HC RX MED GY IP 250 OP 250 PS 637: Performed by: STUDENT IN AN ORGANIZED HEALTH CARE EDUCATION/TRAINING PROGRAM

## 2022-06-18 PROCEDURE — 94640 AIRWAY INHALATION TREATMENT: CPT | Mod: 76

## 2022-06-18 PROCEDURE — 81001 URINALYSIS AUTO W/SCOPE: CPT | Performed by: STUDENT IN AN ORGANIZED HEALTH CARE EDUCATION/TRAINING PROGRAM

## 2022-06-18 PROCEDURE — 87449 NOS EACH ORGANISM AG IA: CPT | Performed by: STUDENT IN AN ORGANIZED HEALTH CARE EDUCATION/TRAINING PROGRAM

## 2022-06-18 PROCEDURE — 999N000157 HC STATISTIC RCP TIME EA 10 MIN

## 2022-06-18 PROCEDURE — 84132 ASSAY OF SERUM POTASSIUM: CPT | Performed by: STUDENT IN AN ORGANIZED HEALTH CARE EDUCATION/TRAINING PROGRAM

## 2022-06-18 PROCEDURE — 71250 CT THORAX DX C-: CPT

## 2022-06-18 PROCEDURE — 99232 SBSQ HOSP IP/OBS MODERATE 35: CPT | Performed by: STUDENT IN AN ORGANIZED HEALTH CARE EDUCATION/TRAINING PROGRAM

## 2022-06-18 RX ORDER — PREDNISONE 20 MG/1
40 TABLET ORAL DAILY
Status: DISCONTINUED | OUTPATIENT
Start: 2022-06-18 | End: 2022-06-20

## 2022-06-18 RX ORDER — FUROSEMIDE 10 MG/ML
20 INJECTION INTRAMUSCULAR; INTRAVENOUS
Status: DISCONTINUED | OUTPATIENT
Start: 2022-06-18 | End: 2022-01-01 | Stop reason: HOSPADM

## 2022-06-18 RX ORDER — IPRATROPIUM BROMIDE AND ALBUTEROL SULFATE 2.5; .5 MG/3ML; MG/3ML
3 SOLUTION RESPIRATORY (INHALATION)
Status: DISCONTINUED | OUTPATIENT
Start: 2022-06-18 | End: 2022-01-01 | Stop reason: HOSPADM

## 2022-06-18 RX ORDER — POTASSIUM CHLORIDE 1500 MG/1
20 TABLET, EXTENDED RELEASE ORAL ONCE
Status: COMPLETED | OUTPATIENT
Start: 2022-06-18 | End: 2022-06-18

## 2022-06-18 RX ADMIN — IPRATROPIUM BROMIDE AND ALBUTEROL SULFATE 3 ML: 2.5; .5 SOLUTION RESPIRATORY (INHALATION) at 19:23

## 2022-06-18 RX ADMIN — FUROSEMIDE 20 MG: 10 INJECTION, SOLUTION INTRAMUSCULAR; INTRAVENOUS at 15:45

## 2022-06-18 RX ADMIN — TAZOBACTAM SODIUM AND PIPERACILLIN SODIUM 2.25 G: 250; 2 INJECTION, SOLUTION INTRAVENOUS at 23:51

## 2022-06-18 RX ADMIN — TAZOBACTAM SODIUM AND PIPERACILLIN SODIUM 2.25 G: 250; 2 INJECTION, SOLUTION INTRAVENOUS at 03:22

## 2022-06-18 RX ADMIN — AMLODIPINE BESYLATE 5 MG: 5 TABLET ORAL at 10:08

## 2022-06-18 RX ADMIN — PREDNISONE 40 MG: 20 TABLET ORAL at 12:41

## 2022-06-18 RX ADMIN — POTASSIUM CHLORIDE 20 MEQ: 1500 TABLET, EXTENDED RELEASE ORAL at 10:08

## 2022-06-18 RX ADMIN — TAZOBACTAM SODIUM AND PIPERACILLIN SODIUM 2.25 G: 250; 2 INJECTION, SOLUTION INTRAVENOUS at 11:32

## 2022-06-18 RX ADMIN — IPRATROPIUM BROMIDE AND ALBUTEROL SULFATE 3 ML: 2.5; .5 SOLUTION RESPIRATORY (INHALATION) at 15:33

## 2022-06-18 RX ADMIN — DULOXETINE HYDROCHLORIDE 60 MG: 60 CAPSULE, DELAYED RELEASE ORAL at 10:08

## 2022-06-18 RX ADMIN — TAZOBACTAM SODIUM AND PIPERACILLIN SODIUM 2.25 G: 250; 2 INJECTION, SOLUTION INTRAVENOUS at 17:22

## 2022-06-18 RX ADMIN — ASPIRIN 81 MG: 81 TABLET, COATED ORAL at 10:08

## 2022-06-18 ASSESSMENT — ACTIVITIES OF DAILY LIVING (ADL)
ADLS_ACUITY_SCORE: 51
ADLS_ACUITY_SCORE: 47

## 2022-06-18 NOTE — PLAN OF CARE
Vital signs stable, O2: 92% on 4L NC. Denies pain. Pt disorientated to time and situation. Pt has been restless, trying to get out of bed, and pulling at lines and cords. Pt pulled off cardiac monitoring 4x this shift. MD notified and discontinued tele. ipad remains in the room and at the bedside to be used as needed for translating. Up with assist of 1 with GB and walker. Regular diet with 1800 ml fluid restriction, Pt consumed 483 ml of fluids this shift. On high potassium protocol. K: 3.9 and recheck scheduled for tomorrow morning. Amlodipine discontinued. Lasix taken off hold. New order for PO prednisone and Duoneb q4. Discharge TBD

## 2022-06-18 NOTE — PLAN OF CARE
Goal Outcome Evaluation:    Plan of Care Reviewed With: patient, daughter        VS stable, denied pain. Purewick in place for urine incontinence. Had loose BM, walked to BR with assist of one, walker and belt. Family brought food. IV Zosyn for pneumonia. Wants to leave hospital with daughter, calm down when daughter left. Continue to monitor.

## 2022-06-18 NOTE — PLAN OF CARE
Disoriented to time/situation. Assist 1 w/ gait/walker. NC 4 L. Pt taking off NC, tele, and purewick throughout the night. Tele: . BEBO MILLER. D/C'ed enteric precautions, c diff results neg. K protocol AM recheck. 1800 FR. LS: coarse/ crackles. Continue POC.

## 2022-06-18 NOTE — PROGRESS NOTES
[facility]    Medicine Progress Note - Hospitalist Service  Date of Admission: 6/14/2022     Assessment & Plan         See Spring is a 82 year old female with past medical history significant for generalized muscle weakness, hypokalemia, hypertension and failure to thrive who presented to [facility] on 6/14/2022 with weakness and was found to have new onset congestive heart failure.    Acute Metabolic Encephalopathy  Failure to Thrive  Currently unclear etiology. Patient more somnolent. Unable to appropriately assess orientation due to limited  resources, but family at bedside says patient not answering orientation questions appropriately. Suspect she may be dry/slightly overdiuresed with tenting of skin/wrinkles in skin. Held additional diuresis afternoon of 6/16/2022 with slight increased level of alertness. Patient still confused about situation, however. Repeating CXR with increased opacities concerning for possible underlying infectious source of encephalopathy. Started antibiotics as below. Does not have focal deficits concerning of possible intracranial pathology; CT head negative for acute findings. VBG pending as well. Ammonia normal. Patient not eating much and appears frail. Palliative care assisting with goals of care. Will hold NG tube discussion until Monday to see if patient's intake and mental status improve with antibiotics over the weekend. 6/18/22: Slightly improved level of alertness after antibiotics started yesterday.    Acute Hypoxic Respiratory Failure  Probable Community-acquired vs Aspiration Pneumonia vs Cryptogenic Organizing Pneumonia  Oxygen requirements to 4-5L this morning. With new somnolence repeat CXR showing worsening bilateral opacities; originally suspected to be edema, but worsening despite diuresis. BCx pending. Starting Zosyn for concern of CAP vs aspiration in setting of somnolence. Possibly more awake after antibiotics. Adding  prednisone due to concern for cyptogenic organizing pneumonia. Pulmonology consulted, will see patient Monday. CT with severe bilateral opacities.  -Zosyn (renally adjusted dose) Q6H  -Legionella, Fungitell pending  -Prednisone 40mg daily  -Duonebs QID    Diarrhea  Noted to have frequent diarrhea per nursing, but incontinent. Cdiff negative.    New Onset Congestive Heart Failure w/ Exacerbation  Moderate Pulmonary Hypertension  Increased weakness and generalized malaise. No reports of shortness of breath or swelling, but found to have NTproBNP 18,991 with slightly elevated troponin to 205. ECG without dynamic ST changes, but with some TWI in anterior leads. TTE with diastolic dysfunction and moderate pulmonary hypertension, but with preserved ejection fraction. Started on diuresis and heparin gtt on admission.  -Cardiology consulted, appreciate recommendations  -Adding more conservative diuresis  -Heparin gtt  -Ischemic workup per cardiology  -Does show some possible obstructive breathing pattern, which may warrant a sleep study on discharge    Troponin Elevation  Troponin 205-->164. Suspect related to supply/demand mismatch in setting of heart failure exacerbation.    Acute Kidney Injury  Creatinine 1.49-->1.16-->1.06. Suspect related to renal congestion as improving with diuresis.    Acute Anemia  Hgb 11.3, decreased from 12.7 4/2022. Associated with decreased hematocrit consistent with hemodilution.    Hyponatremia, resolved     Diet: Orders Placed This Encounter      Regular Diet Adult   DVT Prophylaxis: heparin gtt  Davis Catheter:  Not present  Code Status: Full Code    Expected discharge: Likely 2-3 days pending adequate diuresis and clinical improvement; patient very weak and likely will need placement    The patient's care was discussed with the Bedside Nurse, Patient and Patient's Family.    Adolfo Eden MD, S  Hospitalist Service  [facility]  Hospital  ______________________________________________________________________    Interval History   Nursing notes reviewed. No acute events overnight. More awake this morning and pulling at lines/taking gown off.     Physical Exam   Vital signs:  Temp: 97.6  F (36.4  C) Temp src: Axillary BP: 132/80 Pulse: (P) 92   Resp: 18 SpO2: (P) 94 % O2 Device: (P) Nasal cannula Oxygen Delivery: (P) 4 LPM   Weight: 45.3 kg (99 lb 12.8 oz)  Estimated body mass index is 19.49 kg/m  as calculated from the following:    Height as of 6/24/21: 1.524 m (5').    Weight as of this encounter: 45.3 kg (99 lb 12.8 oz).    General: Elderly female resting in bed.  Awake and pulling at lines/gown.  HEENT: Normocephalic, atraumatic.   Cardiac: Regular rate and rhythm without murmur, gallop, or rub.  1+ peripheral edema.  Respiratory: Decreased work of breathing from prior. Bilateral mid-to-upper crackles.  GI: Normal, active bowel sounds.  Abdomen soft, nontender, nondistended.  : Deferred.  Musculoskeletal: Moving all extremities appropriately.  Skin: No rashes or abrasions on exposed skin.  Neurologic: Unable to assess orientation. CN II thought XII grossly intact.    Data   All laboratory results and other diagnostic data from the past 24 hours is available in Epic and has been personally reviewed.    Recent Labs   Lab 06/18/22  1229 06/18/22  0657 06/17/22  0850 06/17/22  0548 06/16/22  1839 06/16/22  0510 06/15/22  0624 06/14/22  1634   WBC  --   --  10.4  --   --   --  10.3 11.2*   HGB  --   --  12.8  --   --   --  11.3* 11.3*   MCV  --   --  96  --   --   --  95 93   PLT  --   --  305  --   --   --  294 283   NA  --   --   --  137  --  135 136 131*   POTASSIUM 3.9 3.4  --  3.9  3.9   < > 3.0* 3.6 4.1   CHLORIDE  --   --   --  97  --  94 97 98   CO2  --   --   --  31  --  34* 33* 30   BUN  --   --   --  24  --  24 27 33*   CR  --   --   --  1.01  --  1.07* 1.16* 1.49*   ANIONGAP  --   --   --  9  --  7 6 3   SHIRA  --   --   --  8.6   --  8.0* 8.2* 8.6   GLC  --   --   --  98  --  105* 119* 127*   ALBUMIN  --   --   --   --   --   --   --  3.1*   PROTTOTAL  --   --   --   --   --   --   --  7.2   BILITOTAL  --   --   --   --   --   --   --  0.6   ALKPHOS  --   --   --   --   --   --   --  133   ALT  --   --   --   --   --   --   --  37   AST  --   --   --   --   --   --   --  29   LIPASE  --   --   --   --   --   --   --  65*    < > = values in this interval not displayed.     Recent Results (from the past 24 hour(s))   CT Chest w/o Contrast    Narrative    EXAM: CT CHEST W/O CONTRAST  LOCATION: [facility]  DATE/TIME: 6/18/2022 10:30 AM    INDICATION: Hypoxic respiratory failure  COMPARISON: Chest x-ray 06/17/2022, neck CTA 06/24/2021  TECHNIQUE: CT chest without IV contrast. Multiplanar reformats were obtained. Dose reduction techniques were used.  CONTRAST: None.    FINDINGS:   LUNGS AND PLEURA: Bilateral consolidative opacities throughout the lungs. Some of these opacities in the left upper and left lower lobes have a crescentic appearance. Moderate right and trace left pleural effusion.    MEDIASTINUM/AXILLAE: Enlarged multinodular thyroid gland. Few prominent mediastinal lymph nodes with faint calcifications, likely related to prior granulomatous disease. Mild cardiomegaly. Enlarged main pulmonary artery measuring 3.9 cm, suggestive of   pulmonary arterial hypertension. No thoracic aortic aneurysm. Small hiatal hernia.    CORONARY ARTERY CALCIFICATION: Moderate.    UPPER ABDOMEN: No significant finding.    MUSCULOSKELETAL: No suspicious lesions in the bones.      Impression    IMPRESSION:   1.  Extensive bilateral consolidative opacities in both lungs are indeterminate and could represent severe pulmonary edema, and/or atypical pneumonia or organizing pneumonia. Moderate right and small left pleural effusion.  2.  Generalized cardiomegaly. Enlarged main pulmonary artery suggestive of pulmonary arterial  hypertension.  3.  Multinodular enlarged thyroid gland. Nonemergent thyroid ultrasound can be considered for better characterization.       I personally reviewed: no images or EKG's today.

## 2022-06-19 PROCEDURE — 999N000157 HC STATISTIC RCP TIME EA 10 MIN

## 2022-06-19 PROCEDURE — 250N000012 HC RX MED GY IP 250 OP 636 PS 637: Performed by: STUDENT IN AN ORGANIZED HEALTH CARE EDUCATION/TRAINING PROGRAM

## 2022-06-19 PROCEDURE — 99232 SBSQ HOSP IP/OBS MODERATE 35: CPT | Performed by: STUDENT IN AN ORGANIZED HEALTH CARE EDUCATION/TRAINING PROGRAM

## 2022-06-19 PROCEDURE — 120N000001 HC R&B MED SURG/OB

## 2022-06-19 PROCEDURE — 250N000009 HC RX 250: Performed by: STUDENT IN AN ORGANIZED HEALTH CARE EDUCATION/TRAINING PROGRAM

## 2022-06-19 PROCEDURE — 250N000013 HC RX MED GY IP 250 OP 250 PS 637: Performed by: STUDENT IN AN ORGANIZED HEALTH CARE EDUCATION/TRAINING PROGRAM

## 2022-06-19 PROCEDURE — 250N000011 HC RX IP 250 OP 636: Performed by: STUDENT IN AN ORGANIZED HEALTH CARE EDUCATION/TRAINING PROGRAM

## 2022-06-19 PROCEDURE — 94640 AIRWAY INHALATION TREATMENT: CPT

## 2022-06-19 PROCEDURE — 94640 AIRWAY INHALATION TREATMENT: CPT | Mod: 76

## 2022-06-19 RX ADMIN — TAZOBACTAM SODIUM AND PIPERACILLIN SODIUM 2.25 G: 250; 2 INJECTION, SOLUTION INTRAVENOUS at 19:44

## 2022-06-19 RX ADMIN — FUROSEMIDE 20 MG: 10 INJECTION, SOLUTION INTRAMUSCULAR; INTRAVENOUS at 09:03

## 2022-06-19 RX ADMIN — IPRATROPIUM BROMIDE AND ALBUTEROL SULFATE 3 ML: 2.5; .5 SOLUTION RESPIRATORY (INHALATION) at 08:03

## 2022-06-19 RX ADMIN — ASPIRIN 81 MG: 81 TABLET, COATED ORAL at 09:03

## 2022-06-19 RX ADMIN — TAZOBACTAM SODIUM AND PIPERACILLIN SODIUM 2.25 G: 250; 2 INJECTION, SOLUTION INTRAVENOUS at 06:03

## 2022-06-19 RX ADMIN — IPRATROPIUM BROMIDE AND ALBUTEROL SULFATE 3 ML: 2.5; .5 SOLUTION RESPIRATORY (INHALATION) at 16:00

## 2022-06-19 RX ADMIN — IPRATROPIUM BROMIDE AND ALBUTEROL SULFATE 3 ML: 2.5; .5 SOLUTION RESPIRATORY (INHALATION) at 12:00

## 2022-06-19 RX ADMIN — IPRATROPIUM BROMIDE AND ALBUTEROL SULFATE 3 ML: 2.5; .5 SOLUTION RESPIRATORY (INHALATION) at 19:35

## 2022-06-19 RX ADMIN — FUROSEMIDE 20 MG: 10 INJECTION, SOLUTION INTRAMUSCULAR; INTRAVENOUS at 16:06

## 2022-06-19 RX ADMIN — DULOXETINE HYDROCHLORIDE 60 MG: 60 CAPSULE, DELAYED RELEASE ORAL at 09:03

## 2022-06-19 RX ADMIN — TAZOBACTAM SODIUM AND PIPERACILLIN SODIUM 2.25 G: 250; 2 INJECTION, SOLUTION INTRAVENOUS at 13:38

## 2022-06-19 RX ADMIN — PREDNISONE 40 MG: 20 TABLET ORAL at 09:03

## 2022-06-19 ASSESSMENT — ACTIVITIES OF DAILY LIVING (ADL)
ADLS_ACUITY_SCORE: 55
ADLS_ACUITY_SCORE: 55
ADLS_ACUITY_SCORE: 47
ADLS_ACUITY_SCORE: 55
ADLS_ACUITY_SCORE: 55
ADLS_ACUITY_SCORE: 47
ADLS_ACUITY_SCORE: 55
ADLS_ACUITY_SCORE: 55
ADLS_ACUITY_SCORE: 47
ADLS_ACUITY_SCORE: 51
ADLS_ACUITY_SCORE: 47
ADLS_ACUITY_SCORE: 47

## 2022-06-19 NOTE — PLAN OF CARE
Goal Outcome Evaluation:    Plan of Care Reviewed With: patient, daughter     Patient denied pain and SOB, calm and resting comfortably in bed. Uses bedside commode, per daughter eating homemade food. Continue treatment and current POC.

## 2022-06-19 NOTE — PLAN OF CARE
/76 (BP Location: Left arm)   Pulse 87   Temp 98.2  F (36.8  C) (Oral)   Resp 20   Wt 45.2 kg (99 lb 11.2 oz)   SpO2 94%   BMI 19.47 kg/m      NEURO:Confused, disoriented to time, lethargic  PAIN:Denies  IV: SL RA  RESPIRATORY:LS- diminished, KEYES, O2 weaned to 1 LPM per NC sats low- mid 90s  GI:+BS, loose incontinent stools  :Incontinent  SKIN:Bruised  ACTIVITY:Ax1 and walker  DIET:Regular, 1800 ml FR, poor intake, needs encouragement  PLAN:Continue IV lasix, scheduled nebs, PO Prednisone, continue POC.

## 2022-06-19 NOTE — PROGRESS NOTES
"[facility]    Medicine Progress Note - Hospitalist Service  Date of Admission: 6/14/2022     Assessment & Plan         See Spring is a 82 year old female with past medical history significant for generalized muscle weakness, hypokalemia, hypertension and failure to thrive who presented to [facility] on 6/14/2022 with weakness and was found to have new onset congestive heart failure.    Acute Metabolic Encephalopathy  Failure to Thrive  Currently unclear etiology. Patient more somnolent. Unable to appropriately assess orientation due to limited  resources, but family at bedside says patient not answering orientation questions appropriately. Suspect she may be dry/slightly overdiuresed with tenting of skin/wrinkles in skin. Held additional diuresis afternoon of 6/16/2022 with slight increased level of alertness. Patient still confused about situation, however. Repeating CXR with increased opacities concerning for possible underlying infectious source of encephalopathy. Started antibiotics as below. Does not have focal deficits concerning of possible intracranial pathology; CT head negative for acute findings. VBG pending as well. Ammonia normal. Patient not eating much and appears frail. Palliative care assisting with goals of care. Will hold NG tube discussion until Monday to see if patient's intake and mental status improve with antibiotics over the weekend.   6/18/22: Slightly improved level of alertness after antibiotics started yesterday.  6/19/22: Patient a bit more awake and interactive, but still very lethargic and eating minimally. Discussed at bedside with daughter and DIL via phone. Family asked patient about her goals, and she stated that she would not want feeding tubes and \"wants to go home.\" Family is leaning toward comfort focused cares and is amenable to finding a facility for the patient, as they stated that they do not have anyone at home that can care " for the patient 24/7. Will ask palliative to follow up with family tomorrow. Daughter-in-law David is decision maker for family (number in chart).    Acute Hypoxic Respiratory Failure  Probable Community-acquired vs Aspiration Pneumonia vs Cryptogenic Organizing Pneumonia  Oxygen requirements to 4-5L this morning. With new somnolence repeat CXR showing worsening bilateral opacities; originally suspected to be edema, but worsening despite diuresis. BCx pending. Starting Zosyn for concern of CAP vs aspiration in setting of somnolence. Possibly more awake after antibiotics. Adding prednisone due to concern for cyptogenic organizing pneumonia. Cancelling pulmonary consult due to patient plan for comfort-focused cares. CT with severe bilateral opacities.  6/19/22: Able to wean oxygen to 1L today.  -Zosyn (renally adjusted dose) Q6H  -Legionella, Fungitell pending  -Prednisone 40mg daily  -Duonebs QID    Diarrhea  Noted to have frequent diarrhea per nursing, but incontinent. Cdiff negative.    New Onset Congestive Heart Failure w/ Exacerbation  Moderate Pulmonary Hypertension  Increased weakness and generalized malaise. No reports of shortness of breath or swelling, but found to have NTproBNP 18,991 with slightly elevated troponin to 205. ECG without dynamic ST changes, but with some TWI in anterior leads. TTE with diastolic dysfunction and moderate pulmonary hypertension, but with preserved ejection fraction. Started on diuresis and heparin gtt on admission.  -Cardiology consulted, appreciate recommendations  -Adding more conservative diuresis  -Heparin gtt  -Ischemic workup per cardiology  -Does show some possible obstructive breathing pattern, which may warrant a sleep study on discharge    Troponin Elevation  Troponin 205-->164. Suspect related to supply/demand mismatch in setting of heart failure exacerbation.    Acute Kidney Injury  Creatinine 1.49-->1.16-->1.06. Suspect related to renal congestion as improving with  diuresis.    Acute Anemia  Hgb 11.3, decreased from 12.7 4/2022. Associated with decreased hematocrit consistent with hemodilution.    Hyponatremia, resolved     Diet: Orders Placed This Encounter      Regular Diet Adult   DVT Prophylaxis: heparin gtt  Davis Catheter:  Not present  Code Status: Full Code    Expected discharge: Likely 2-3 days pending transition to comfort-focused cares (pending formal palliative discussion with family tomorrow) and placement.    The patient's care was discussed with the Bedside Nurse, Patient and Patient's Family.    Adolfo Eden MD, S  Hospitalist Service  [facility]  ______________________________________________________________________    Interval History   Nursing notes reviewed. No acute events overnight. Patient a bit more awake, but still not eating per daughter. Discussion with daughter and DIL, who discussed with patient, plans to transition to comfort-focused measures.     Physical Exam   Vital signs:  Temp: 98.2  F (36.8  C) Temp src: Oral BP: 119/76 Pulse: 87   Resp: 20 SpO2: 94 % O2 Device: Nasal cannula Oxygen Delivery: 1 LPM   Weight: 45.2 kg (99 lb 11.2 oz)  Estimated body mass index is 19.47 kg/m  as calculated from the following:    Height as of 6/24/21: 1.524 m (5').    Weight as of this encounter: 45.2 kg (99 lb 11.2 oz).    General: Elderly female resting in bed.  Awake and pulling at lines/gown.  HEENT: Normocephalic, atraumatic.   Cardiac: Regular rate and rhythm.  1+ peripheral edema.  Respiratory: Normal work of breathing on NC. Bilateral mid-to-upper crackles improving.  GI: Normal, active bowel sounds.  Abdomen soft, nontender, nondistended.  : Deferred.  Musculoskeletal: Moving all extremities appropriately.  Skin: No rashes or abrasions on exposed skin.  Neurologic: Unable to assess orientation. CN II thought XII grossly intact.    Data   All laboratory results and other diagnostic data from the past 24 hours is available in Epic  and has been personally reviewed.    Recent Labs   Lab 06/18/22  1229 06/18/22  0657 06/17/22  0850 06/17/22  0548 06/16/22  1839 06/16/22  0510 06/15/22  0624 06/14/22  1634   WBC  --   --  10.4  --   --   --  10.3 11.2*   HGB  --   --  12.8  --   --   --  11.3* 11.3*   MCV  --   --  96  --   --   --  95 93   PLT  --   --  305  --   --   --  294 283   NA  --   --   --  137  --  135 136 131*   POTASSIUM 3.9 3.4  --  3.9  3.9   < > 3.0* 3.6 4.1   CHLORIDE  --   --   --  97  --  94 97 98   CO2  --   --   --  31  --  34* 33* 30   BUN  --   --   --  24  --  24 27 33*   CR  --   --   --  1.01  --  1.07* 1.16* 1.49*   ANIONGAP  --   --   --  9  --  7 6 3   SHIRA  --   --   --  8.6  --  8.0* 8.2* 8.6   GLC  --   --   --  98  --  105* 119* 127*   ALBUMIN  --   --   --   --   --   --   --  3.1*   PROTTOTAL  --   --   --   --   --   --   --  7.2   BILITOTAL  --   --   --   --   --   --   --  0.6   ALKPHOS  --   --   --   --   --   --   --  133   ALT  --   --   --   --   --   --   --  37   AST  --   --   --   --   --   --   --  29   LIPASE  --   --   --   --   --   --   --  65*    < > = values in this interval not displayed.     No results found for this or any previous visit (from the past 24 hour(s)).  I personally reviewed: no images or EKG's today.

## 2022-06-19 NOTE — PLAN OF CARE
Disoriented to time/situation. Assist 1 w/ walker/gait. R PIV SL. 4 L NC. KEYES. K protocol AM recheck. 1800 FR. Pt restless throughout the night, impulsive. Zosyn q 6. Continue POC.

## 2022-06-20 ENCOUNTER — PATIENT OUTREACH (OUTPATIENT)
Dept: CARE COORDINATION | Facility: CLINIC | Age: 82
End: 2022-06-20
Payer: MEDICARE

## 2022-06-20 LAB
1,3 BETA GLUCAN SER-MCNC: <31 PG/ML
ANION GAP SERPL CALCULATED.3IONS-SCNC: 5 MMOL/L (ref 3–14)
BACTERIA UR CULT: NORMAL
BASOPHILS # BLD AUTO: 0 10E3/UL (ref 0–0.2)
BASOPHILS NFR BLD AUTO: 0 %
BUN SERPL-MCNC: 18 MG/DL (ref 7–30)
CALCIUM SERPL-MCNC: 8.9 MG/DL (ref 8.5–10.1)
CHLORIDE BLD-SCNC: 97 MMOL/L (ref 94–109)
CO2 SERPL-SCNC: 35 MMOL/L (ref 20–32)
CREAT SERPL-MCNC: 0.98 MG/DL (ref 0.52–1.04)
EOSINOPHIL # BLD AUTO: 0.1 10E3/UL (ref 0–0.7)
EOSINOPHIL NFR BLD AUTO: 1 %
ERYTHROCYTE [DISTWIDTH] IN BLOOD BY AUTOMATED COUNT: 12.6 % (ref 10–15)
GFR SERPL CREATININE-BSD FRML MDRD: 57 ML/MIN/1.73M2
GLUCOSE BLD-MCNC: 117 MG/DL (ref 70–99)
HCT VFR BLD AUTO: 39.9 % (ref 35–47)
HGB BLD-MCNC: 12.9 G/DL (ref 11.7–15.7)
HOLD SPECIMEN: NORMAL
IMM GRANULOCYTES # BLD: 0 10E3/UL
IMM GRANULOCYTES NFR BLD: 1 %
LYMPHOCYTES # BLD AUTO: 1.2 10E3/UL (ref 0.8–5.3)
LYMPHOCYTES NFR BLD AUTO: 15 %
MCH RBC QN AUTO: 30.6 PG (ref 26.5–33)
MCHC RBC AUTO-ENTMCNC: 32.3 G/DL (ref 31.5–36.5)
MCV RBC AUTO: 95 FL (ref 78–100)
MONOCYTES # BLD AUTO: 0.9 10E3/UL (ref 0–1.3)
MONOCYTES NFR BLD AUTO: 11 %
NEUTROPHILS # BLD AUTO: 6.1 10E3/UL (ref 1.6–8.3)
NEUTROPHILS NFR BLD AUTO: 72 %
NRBC # BLD AUTO: 0 10E3/UL
NRBC BLD AUTO-RTO: 0 /100
OBSERVATION IMP: NEGATIVE
PLATELET # BLD AUTO: 326 10E3/UL (ref 150–450)
POTASSIUM BLD-SCNC: 3 MMOL/L (ref 3.4–5.3)
POTASSIUM BLD-SCNC: 3.1 MMOL/L (ref 3.4–5.3)
RBC # BLD AUTO: 4.21 10E6/UL (ref 3.8–5.2)
SODIUM SERPL-SCNC: 137 MMOL/L (ref 133–144)
WBC # BLD AUTO: 8.3 10E3/UL (ref 4–11)

## 2022-06-20 PROCEDURE — 99356 PR PROLONGED SERV,INPATIENT,1ST HR: CPT | Performed by: NURSE PRACTITIONER

## 2022-06-20 PROCEDURE — 250N000012 HC RX MED GY IP 250 OP 636 PS 637: Performed by: STUDENT IN AN ORGANIZED HEALTH CARE EDUCATION/TRAINING PROGRAM

## 2022-06-20 PROCEDURE — 85014 HEMATOCRIT: CPT | Performed by: INTERNAL MEDICINE

## 2022-06-20 PROCEDURE — 999N000157 HC STATISTIC RCP TIME EA 10 MIN

## 2022-06-20 PROCEDURE — 94640 AIRWAY INHALATION TREATMENT: CPT

## 2022-06-20 PROCEDURE — 36415 COLL VENOUS BLD VENIPUNCTURE: CPT | Performed by: INTERNAL MEDICINE

## 2022-06-20 PROCEDURE — 82310 ASSAY OF CALCIUM: CPT | Performed by: INTERNAL MEDICINE

## 2022-06-20 PROCEDURE — 250N000013 HC RX MED GY IP 250 OP 250 PS 637: Performed by: INTERNAL MEDICINE

## 2022-06-20 PROCEDURE — 94640 AIRWAY INHALATION TREATMENT: CPT | Mod: 76

## 2022-06-20 PROCEDURE — 99233 SBSQ HOSP IP/OBS HIGH 50: CPT | Performed by: NURSE PRACTITIONER

## 2022-06-20 PROCEDURE — 250N000013 HC RX MED GY IP 250 OP 250 PS 637: Performed by: STUDENT IN AN ORGANIZED HEALTH CARE EDUCATION/TRAINING PROGRAM

## 2022-06-20 PROCEDURE — 250N000011 HC RX IP 250 OP 636: Performed by: INTERNAL MEDICINE

## 2022-06-20 PROCEDURE — 99233 SBSQ HOSP IP/OBS HIGH 50: CPT | Performed by: INTERNAL MEDICINE

## 2022-06-20 PROCEDURE — 36415 COLL VENOUS BLD VENIPUNCTURE: CPT | Performed by: STUDENT IN AN ORGANIZED HEALTH CARE EDUCATION/TRAINING PROGRAM

## 2022-06-20 PROCEDURE — 120N000001 HC R&B MED SURG/OB

## 2022-06-20 PROCEDURE — 250N000011 HC RX IP 250 OP 636: Performed by: STUDENT IN AN ORGANIZED HEALTH CARE EDUCATION/TRAINING PROGRAM

## 2022-06-20 PROCEDURE — 84132 ASSAY OF SERUM POTASSIUM: CPT | Performed by: STUDENT IN AN ORGANIZED HEALTH CARE EDUCATION/TRAINING PROGRAM

## 2022-06-20 PROCEDURE — 250N000009 HC RX 250: Performed by: STUDENT IN AN ORGANIZED HEALTH CARE EDUCATION/TRAINING PROGRAM

## 2022-06-20 RX ORDER — HEPARIN SODIUM 5000 [USP'U]/.5ML
5000 INJECTION, SOLUTION INTRAVENOUS; SUBCUTANEOUS EVERY 12 HOURS
Status: DISCONTINUED | OUTPATIENT
Start: 2022-06-20 | End: 2022-06-20

## 2022-06-20 RX ORDER — MORPHINE SULFATE 10 MG/5ML
5-10 SOLUTION ORAL
Status: DISCONTINUED | OUTPATIENT
Start: 2022-06-20 | End: 2022-01-01 | Stop reason: HOSPADM

## 2022-06-20 RX ORDER — DULOXETIN HYDROCHLORIDE 30 MG/1
30 CAPSULE, DELAYED RELEASE ORAL DAILY
Status: DISCONTINUED | OUTPATIENT
Start: 2022-01-01 | End: 2022-01-01 | Stop reason: HOSPADM

## 2022-06-20 RX ORDER — LORAZEPAM 2 MG/ML
1 INJECTION INTRAMUSCULAR
Status: DISCONTINUED | OUTPATIENT
Start: 2022-06-20 | End: 2022-01-01 | Stop reason: HOSPADM

## 2022-06-20 RX ORDER — POTASSIUM CHLORIDE 20MEQ/15ML
20 LIQUID (ML) ORAL ONCE
Status: DISCONTINUED | OUTPATIENT
Start: 2022-06-20 | End: 2022-06-20 | Stop reason: ALTCHOICE

## 2022-06-20 RX ORDER — GLYCOPYRROLATE 0.2 MG/ML
0.2 INJECTION, SOLUTION INTRAMUSCULAR; INTRAVENOUS EVERY 4 HOURS PRN
Status: DISCONTINUED | OUTPATIENT
Start: 2022-06-20 | End: 2022-01-01 | Stop reason: HOSPADM

## 2022-06-20 RX ORDER — NALOXONE HYDROCHLORIDE 0.4 MG/ML
0.1 INJECTION, SOLUTION INTRAMUSCULAR; INTRAVENOUS; SUBCUTANEOUS
Status: DISCONTINUED | OUTPATIENT
Start: 2022-06-20 | End: 2022-01-01 | Stop reason: HOSPADM

## 2022-06-20 RX ORDER — POTASSIUM CHLORIDE 20MEQ/15ML
40 LIQUID (ML) ORAL ONCE
Status: COMPLETED | OUTPATIENT
Start: 2022-06-20 | End: 2022-06-20

## 2022-06-20 RX ORDER — NALOXONE HYDROCHLORIDE 0.4 MG/ML
0.2 INJECTION, SOLUTION INTRAMUSCULAR; INTRAVENOUS; SUBCUTANEOUS
Status: DISCONTINUED | OUTPATIENT
Start: 2022-06-20 | End: 2022-01-01 | Stop reason: HOSPADM

## 2022-06-20 RX ORDER — MORPHINE SULFATE 20 MG/ML
5-10 SOLUTION ORAL
Status: DISCONTINUED | OUTPATIENT
Start: 2022-06-20 | End: 2022-01-01 | Stop reason: HOSPADM

## 2022-06-20 RX ORDER — LORAZEPAM 1 MG/1
1 TABLET ORAL
Status: DISCONTINUED | OUTPATIENT
Start: 2022-06-20 | End: 2022-01-01 | Stop reason: HOSPADM

## 2022-06-20 RX ADMIN — IPRATROPIUM BROMIDE AND ALBUTEROL SULFATE 3 ML: 2.5; .5 SOLUTION RESPIRATORY (INHALATION) at 20:52

## 2022-06-20 RX ADMIN — DULOXETINE HYDROCHLORIDE 60 MG: 60 CAPSULE, DELAYED RELEASE ORAL at 09:47

## 2022-06-20 RX ADMIN — ASPIRIN 81 MG: 81 TABLET, COATED ORAL at 09:47

## 2022-06-20 RX ADMIN — PREDNISONE 40 MG: 20 TABLET ORAL at 09:47

## 2022-06-20 RX ADMIN — TAZOBACTAM SODIUM AND PIPERACILLIN SODIUM 2.25 G: 250; 2 INJECTION, SOLUTION INTRAVENOUS at 01:18

## 2022-06-20 RX ADMIN — TAZOBACTAM SODIUM AND PIPERACILLIN SODIUM 2.25 G: 250; 2 INJECTION, SOLUTION INTRAVENOUS at 07:08

## 2022-06-20 RX ADMIN — IPRATROPIUM BROMIDE AND ALBUTEROL SULFATE 3 ML: 2.5; .5 SOLUTION RESPIRATORY (INHALATION) at 11:27

## 2022-06-20 RX ADMIN — FUROSEMIDE 20 MG: 10 INJECTION, SOLUTION INTRAMUSCULAR; INTRAVENOUS at 09:42

## 2022-06-20 RX ADMIN — IPRATROPIUM BROMIDE AND ALBUTEROL SULFATE 3 ML: 2.5; .5 SOLUTION RESPIRATORY (INHALATION) at 15:38

## 2022-06-20 RX ADMIN — POTASSIUM CHLORIDE 40 MEQ: 1.5 SOLUTION ORAL at 10:30

## 2022-06-20 RX ADMIN — IPRATROPIUM BROMIDE AND ALBUTEROL SULFATE 3 ML: 2.5; .5 SOLUTION RESPIRATORY (INHALATION) at 07:45

## 2022-06-20 RX ADMIN — FUROSEMIDE 20 MG: 10 INJECTION, SOLUTION INTRAMUSCULAR; INTRAVENOUS at 15:22

## 2022-06-20 RX ADMIN — HEPARIN SODIUM 5000 UNITS: 10000 INJECTION, SOLUTION INTRAVENOUS; SUBCUTANEOUS at 09:47

## 2022-06-20 ASSESSMENT — ACTIVITIES OF DAILY LIVING (ADL)
ADLS_ACUITY_SCORE: 59
ADLS_ACUITY_SCORE: 55
ADLS_ACUITY_SCORE: 55
ADLS_ACUITY_SCORE: 59
ADLS_ACUITY_SCORE: 55

## 2022-06-20 NOTE — PROGRESS NOTES
[facility]  Palliative Care Progress Note  Text Page     Assessment & Plan   Recommendations:  1. Goals of Care- No CPR- Do NOT Intubate  Hospitalization goals discussed with patient's granddaughter and daughter in law David who is designated surrogate for family.  Goal to transition to comfort measures, discharge to facility on Hospice care.    Decisional Capacity- Unreliable. Patient does not have an advance directive. Per  informed consent policy next of kin should be involved in all consent and decision making.  Family/Patient designated surrogate is patient's daughter in law, David Ornelas.  Phone number: [phone]  POLST - Completed POLST form to reflect DNR, comfort focused care.  Verbal discussion and agreement obtained via telephone.  - comfort measures order set placed.    - de-escalation of medications per patient's goals and family request.   - discontinued: ASA, decreaesd cymbalta to wean off, prednisone, zosyn, heparin subcutaneous     2. Encephalopathy  Decreased level of consciousness on arrival. Multifactorial, progressive decline/FTT and metabolic abnormalities.  - comfort measures     3. Dysphagia  Baseline decreased PO intake.  Per family report, difficulty with swallowing, coughing, vomiting and choking episodes at home.  Patient requiring crushed medications and has difficulty swallowing.  - de-escalation of medications to prevent aspiration/discomfort.    - comfort measures     4. Malnutrition, severe Protein calorie.  5 pound weight loss in the past 2 months - fluid retention may be leading to misrepresented weight trend.  Given findings, would expect greater weight loss when patient at dry weight.  Difficulty with PO intake.  Global muscle wasting on assessment.  - food for comfort.     5. Weakness  Patient with decline in mobility at home.  Granddaughter working as PCA for patient at home.  - Recommend PT/OT evaluation when mental status improved.     6.  Spiritual Care  Oriented to Spiritual Health as part of Palliative Care team.  Spiritual Background: not designated     7. Care Planning  Appreciate Care of multidisciplinary team.  Discharge to Facility on Hospice, pending.  Medications for discharge will be determined once facility and hospice agency confirmed.     Medical Decision Making and Goals of Care:  Discussed on June 20, 2022 with PRISCILA Leigh CNP:   Met with patient at the bedside.  She is oriented to self and alert.  Did not respond to assessment questions regarding symptoms or understanding of hospitalization.  Granddaughter at the bedside served as  for brief assessment.    Called and spoke to David, patient's daughter in law.  Reviewed her role in decision making and she stated that she is family's  for coordinating discharge to facility on hospice.  Reviewed with her the family's understanding of prognosis, condition, goals and previous conversations with medical team.  She stated that they held family conference to determine plan of care.  Discussed that See would want comfort and to not have invasive procedures or tests done to extend life.  Stated that See had stated these things in previous conversations when more lucid.      David outlined their understanding of hospice due to exposure to hospice cares with See's .  David asked appropriate questions regarding enrollment and cares provided.  Reviewed plan for facility discharge and David asked appropriate questions.  Deferred specific facility related questions and review of family preferences to .      Discussed current medications and reviewed purpose of all medications on MAR.  David stated preference to decrease medication burden so that patient did not have many pills or IV medications that would not promote comfort.  Medication de-escalation recommendations made and David verbalized understanding and agreement to recommendations.       Discussed Code Status and David stated the goal is comfort and requested change to DNR - Do NOT intubate.  Order updated.      Discussed POLST form to communicate plan to facility and hospice team.  David verbalized understanding of this and provided telephone agreement for signature.      PRISCILA Leigh CNP  Pain Management and Palliative Care  [facility]  Pgr: [phone]      Time Spent on this Encounter   Total unit/floor time 65 minutes (1050 - 1155), time consisted of the following, examination of the patient, reviewing the record and completing documentation. >50% of time spent in counseling and coordination of care, Bedside Nurse Sandra, Hospitalist Ann and  Lenora.  Time spend counseling with patient and family consisted of the following topics, goals of care, care planning for discharge and symptom management.    Total Visit Time: 65  o For Outpatient, 'Total Visit Time' = Face-to-Face time only.  o For Inpatient, 'Total Visit Time' = Unit Floor + Face-to-Face time.    Total Face-to-Face Prolonged Service Time: 65    Content of the Prolonged Time: goals of care, comfort, symptom management, hospice enrollment, discharge planning, POLST form completion      Review of Systems    ROS otherwise negative    Physical Exam   Temp:  [98.2  F (36.8  C)-99.3  F (37.4  C)] 98.5  F (36.9  C)  Pulse:  [70-87] 80  Resp:  [18-20] 18  BP: (119-136)/(76-83) 136/83  SpO2:  [94 %-98 %] 97 %  99 lbs 8 oz  Exam:  GEN:  Alert, oriented to self, appears comfortable, NAD.  HEENT:  Normocephalic/atraumatic, no scleral icterus, no nasal discharge, mouth moist.  RESP: Symmetric chest rise on inhalation noted.  Normal respiratory effort.  ABD:  Rounded, soft, non-tender/non-distended.  +BS  EXT:  CMS intact x 4.     SKIN:  Warm, Dry to touch    PAIN BEHAVIOR: Cooperative  Psych:  Confused.    Medications     - MEDICATION INSTRUCTIONS -         [START ON 6/21/2022] DULoxetine  30  mg Oral Daily     furosemide  20 mg Intravenous BID     ipratropium - albuterol 0.5 mg/2.5 mg/3 mL  3 mL Nebulization 4x daily     sodium chloride (PF)  3 mL Intracatheter Q8H       Data   Results for orders placed or performed during the hospital encounter of 06/14/22 (from the past 24 hour(s))   Potassium   Result Value Ref Range    Potassium 3.1 (L) 3.4 - 5.3 mmol/L   Extra Tube    Narrative    The following orders were created for panel order Extra Tube.  Procedure                               Abnormality         Status                     ---------                               -----------         ------                     Extra Purple Top Tube[[id number]]                            Final result                 Please view results for these tests on the individual orders.   Extra Purple Top Tube   Result Value Ref Range    Hold Specimen [facility]    CBC with Platelets & Differential    Narrative    The following orders were created for panel order CBC with Platelets & Differential.  Procedure                               Abnormality         Status                     ---------                               -----------         ------                     CBC with platelets and d...[[id number]]                      Final result                 Please view results for these tests on the individual orders.   Basic metabolic panel   Result Value Ref Range    Sodium 137 133 - 144 mmol/L    Potassium 3.0 (L) 3.4 - 5.3 mmol/L    Chloride 97 94 - 109 mmol/L    Carbon Dioxide (CO2) 35 (H) 20 - 32 mmol/L    Anion Gap 5 3 - 14 mmol/L    Urea Nitrogen 18 7 - 30 mg/dL    Creatinine 0.98 0.52 - 1.04 mg/dL    Calcium 8.9 8.5 - 10.1 mg/dL    Glucose 117 (H) 70 - 99 mg/dL    GFR Estimate 57 (L) >60 mL/min/1.73m2   CBC with platelets and differential   Result Value Ref Range    WBC Count 8.3 4.0 - 11.0 10e3/uL    RBC Count 4.21 3.80 - 5.20 10e6/uL    Hemoglobin 12.9 11.7 - 15.7 g/dL    Hematocrit 39.9 35.0 - 47.0 %    MCV 95 78 - 100  fL    MCH 30.6 26.5 - 33.0 pg    MCHC 32.3 31.5 - 36.5 g/dL    RDW 12.6 10.0 - 15.0 %    Platelet Count 326 150 - 450 10e3/uL    % Neutrophils 72 %    % Lymphocytes 15 %    % Monocytes 11 %    % Eosinophils 1 %    % Basophils 0 %    % Immature Granulocytes 1 %    NRBCs per 100 WBC 0 <1 /100    Absolute Neutrophils 6.1 1.6 - 8.3 10e3/uL    Absolute Lymphocytes 1.2 0.8 - 5.3 10e3/uL    Absolute Monocytes 0.9 0.0 - 1.3 10e3/uL    Absolute Eosinophils 0.1 0.0 - 0.7 10e3/uL    Absolute Basophils 0.0 0.0 - 0.2 10e3/uL    Absolute Immature Granulocytes 0.0 <=0.4 10e3/uL    Absolute NRBCs 0.0 10e3/uL

## 2022-06-20 NOTE — PLAN OF CARE
Goal Outcome Evaluation:    Temp: 98.2  F (36.8  C) Temp src: Oral BP: 119/76 Pulse: 75   Resp: 18 SpO2: 96 % O2 Device: Nasal cannula Oxygen Delivery: 1 LPM     Lethargic. Pt sleeping all during this shift. Awake w voice/shaking. Hmong speaking. Family was at bedside translating. IV Zosyn. IV lasix. scheduled nebs. K AM draw. Purewick in place. Incontinent cares done. Palliative consult tomorrow.

## 2022-06-20 NOTE — PROGRESS NOTES
Clinic Care Coordination Contact  Care Team Conversations    Pt's daughter Tawny contacted the CC SW to provide an update on the pt's current status and changes in her care. Tawny stated that due to her mom's condition that her and her siblings have decided the best place for her mom is with a TCU/SNF/Palliative Care as it is hard and they are unable to care for her. CC SW relayed the update to the pt's PCP in regards to the pt's hospitalization and future placement.     CC SSW will do no further outreaches to the pt.     KELI Jackman  Social Work Care Coordinator - [facility]  Care Coordination  [email or web address]  [email or web address]  Cell Phone: [phone]  Gender pronouns: she/her  Employed by [employer]

## 2022-06-20 NOTE — PLAN OF CARE
11:30 - Per palliative team: patient transitioned to comfort cares. Writer discussed POC w/ family at bedside.       Shift Summary (5104-2201):     Alert, disoriented to time & situation. Lethargy. Hmong speaking; assessment limited despite family translation at bedside. On 1 L nasal cannula. Up w/ assistx1 GBW. Incontinent of B&B. Assist w/ repositioning in bed. R PIV SL. LS dim. Discharge timeline TBD - pending hospice placement.

## 2022-06-20 NOTE — PROGRESS NOTES
Care Management Note    Length of Stay (days): 6    Expected Discharge Date: 06/22/2022     Concerns to be Addressed: discharge planning  Patient plan of care discussed at interdisciplinary rounds: Yes    Anticipated Discharge Disposition: Hospice placement     Anticipated Discharge Services:  Hospice  Anticipated Discharge DME:      Patient/family educated on Medicare website which has current facility and service quality ratings:    Education Provided on the Discharge Plan:  yes  Patient/Family in Agreement with the Plan:  yes    Referrals Placed by CM/SW:   Private pay costs discussed: Not applicable    Additional Information:  Reviewed pt's status and discussed in rounds. Updated by palliative care NP that plan is to transition to comfort cares and pt needing placement. Palliative requested writer contact pt's dtr-in-law David p:[phone] as she is the designated decision maker.     HEAVEN placed phone call to David to discuss discharge planning. David provided writer with contact information for Bryan Jama p:[phone] who runs an  [employer] . David requested writer reach out to inquire about availability and if pt would be appropriate. If this  cannot accommodate pt, David is agreeable to additional referrals being placed.     HEAVEN placed phone call and spoke with Brayn. Per Bryan, they unfortunately do not have any beds available at this time. HEAVEN inquired if Bryan was aware of any other facilities/placement options similar to her  and she could only identify one in [place] which she also reported is currently full.     HEAVEN left VM for David with update.     Writer placed referrals to the following LTC facilities:    - [facility]  -  [facility]  - [facility]    ADDENDUM @ 1600:    Received call from pt's [facility] REBECCA Watson p:[phone]. HEAVEN provided update on discharge plans. [facility] an option (contact Carmen Ornelas p:[phone]) that she would recommend. Shirley requested to be kept updated on discharge plans.     SW placed phone call to Carmen Ornelas and left VM requesting return call.    Social work will continue to follow and assist with discharge planning as needed.    FERMIN Thomas, W  Inpatient Care Coordination   MS3, [facility]  [phone]    FERMIN Burks

## 2022-06-20 NOTE — PROGRESS NOTES
"[facility]    Medicine Progress Note - Hospitalist Service    Date of Admission:  6/14/2022    Assessment & Plan        See Spring is a 82 year old female with past medical history significant for generalized muscle weakness, hypokalemia, hypertension and failure to thrive who presented to [facility] on 6/14/2022 with weakness and was found to have new onset congestive heart failure.     Acute Metabolic Encephalopathy  Failure to Thrive  Currently unclear etiology. Patient more somnolent. Unable to appropriately assess orientation due to limited  resources, but family at bedside says patient not answering orientation questions appropriately. Suspect she may be dry/slightly overdiuresed with tenting of skin/wrinkles in skin. Held additional diuresis afternoon of 6/16/2022 with slight increased level of alertness. Patient still confused about situation, however. Repeating CXR with increased opacities concerning for possible underlying infectious source of encephalopathy. Started antibiotics as below. Does not have focal deficits concerning of possible intracranial pathology; CT head negative for acute findings. VBG pending as well. Ammonia normal. Patient not eating much and appears frail. Palliative care assisting with goals of care. Will hold NG tube discussion until Monday to see if patient's intake and mental status improve with antibiotics over the weekend.   6/18/22: Slightly improved level of alertness after antibiotics started yesterday.  6/19/22: Patient a bit more awake and interactive, but still very lethargic and eating minimally. Discussed at bedside with daughter and DIL via phone. Family asked patient about her goals, and she stated that she would not want feeding tubes and \"wants to go home.\" Family is leaning toward comfort focused cares and is amenable to finding a facility for the patient, as they stated that they do not have anyone at home that can " care for the patient 24/7. Will ask palliative to follow up with family tomorrow. Daughter-in-law David is decision maker for family (number in chart).     Acute Hypoxic Respiratory Failure  Probable Community-acquired vs Aspiration Pneumonia vs Cryptogenic Organizing Pneumonia  Oxygen requirements to 4-5L this morning. With new somnolence repeat CXR showing worsening bilateral opacities; originally suspected to be edema, but worsening despite diuresis. BCx pending. Starting Zosyn for concern of CAP vs aspiration in setting of somnolence. Possibly more awake after antibiotics. Adding prednisone due to concern for cyptogenic organizing pneumonia. Cancelling pulmonary consult due to patient plan for comfort-focused cares. CT with severe bilateral opacities.  6/19/22: Able to wean oxygen to 1L today.  -Zosyn (renally adjusted dose) Q6H  -Legionella, Fungitell pending  -Prednisone 40mg daily  -Duonebs QID     Diarrhea  Noted to have frequent diarrhea per nursing, but incontinent. Cdiff negative.     New Onset Congestive Heart Failure w/ Exacerbation  Moderate Pulmonary Hypertension  Increased weakness and generalized malaise. No reports of shortness of breath or swelling, but found to have NTproBNP 18,991 with slightly elevated troponin to 205. ECG without dynamic ST changes, but with some TWI in anterior leads. TTE with diastolic dysfunction and moderate pulmonary hypertension, but with preserved ejection fraction. Started on diuresis and heparin gtt on admission.  -Cardiology consulted, appreciate recommendations  -Adding more conservative diuresis  -Ischemic workup per cardiology  -Does show some possible obstructive breathing pattern, which may warrant a sleep study on discharge     Troponin Elevation  Troponin 205-->164. Suspect related to supply/demand mismatch in setting of heart failure exacerbation.     Acute Kidney Injury  Creatinine 1.49-->1.16-->1.06. Suspect related to renal congestion as improving with  diuresis.     Acute Anemia  Hgb 11.3, decreased from 12.7 4/2022. Associated with decreased hematocrit consistent with hemodilution.     Hyponatremia, resolved             Diet: Fluid restriction 1800 ML FLUID  Regular Diet Adult  Snacks/Supplements Adult: Ensure Enlive; Between Meals    DVT Prophylaxis: Heparin SQ  Davis Catheter: Not present  Central Lines: None  Cardiac Monitoring: ACTIVE order. Indication: Acute decompensated heart failure (48 hours)  Code Status: No CPR- Do NOT Intubate      Disposition Plan   Expected Discharge: 06/23/2022     Anticipated discharge location:  Awaiting care coordination huddle  Delays:            The patient's care was discussed with the Bedside Nurse.    Kathleen Juarez MD  Hospitalist Service  [facility]  Securely message with the Vocera Web Console (learn more here)  Text page via [employer] Paging/Directory         Clinically Significant Risk Factors Present on Admission                    ______________________________________________________________________    Interval History     Lethargic.    Data reviewed today: I reviewed all medications, new labs and imaging results over the last 24 hours. I personally reviewed no images or EKG's today.    Physical Exam   Vital Signs: Temp: 98.5  F (36.9  C) Temp src: Oral BP: 136/83 Pulse: 72   Resp: 18 SpO2: 94 % O2 Device: Nasal cannula Oxygen Delivery: 1 LPM  Weight: 99 lbs 8 oz    General: Elderly female resting in bed.  Awake and pulling at lines/gown.  HEENT: Normocephalic, atraumatic.   Cardiac: Regular rate and rhythm.  1+ peripheral edema.  Respiratory: Normal work of breathing on NC. Bilateral mid-to-upper crackles improving.  GI: Normal, active bowel sounds.  Abdomen soft, nontender, nondistended.  : Deferred.  Musculoskeletal: Moving all extremities appropriately.  Skin: No rashes or abrasions on exposed skin.  Neurologic: Unable to assess orientation. CN II thought XII grossly intact.    Data    Recent Labs   Lab 06/20/22  0555 06/18/22  1229 06/18/22  0657 06/17/22  0850 06/17/22  0548 06/16/22  1839 06/16/22  0510 06/15/22  0624 06/14/22  1634   WBC  --   --   --  10.4  --   --   --  10.3 11.2*   HGB  --   --   --  12.8  --   --   --  11.3* 11.3*   MCV  --   --   --  96  --   --   --  95 93   PLT  --   --   --  305  --   --   --  294 283   NA  --   --   --   --  137  --  135 136 131*   POTASSIUM 3.1* 3.9 3.4  --  3.9  3.9   < > 3.0* 3.6 4.1   CHLORIDE  --   --   --   --  97  --  94 97 98   CO2  --   --   --   --  31  --  34* 33* 30   BUN  --   --   --   --  24  --  24 27 33*   CR  --   --   --   --  1.01  --  1.07* 1.16* 1.49*   ANIONGAP  --   --   --   --  9  --  7 6 3   SHIRA  --   --   --   --  8.6  --  8.0* 8.2* 8.6   GLC  --   --   --   --  98  --  105* 119* 127*   ALBUMIN  --   --   --   --   --   --   --   --  3.1*   PROTTOTAL  --   --   --   --   --   --   --   --  7.2   BILITOTAL  --   --   --   --   --   --   --   --  0.6   ALKPHOS  --   --   --   --   --   --   --   --  133   ALT  --   --   --   --   --   --   --   --  37   AST  --   --   --   --   --   --   --   --  29   LIPASE  --   --   --   --   --   --   --   --  65*    < > = values in this interval not displayed.     No results found for this or any previous visit (from the past 24 hour(s)).  Medications     - MEDICATION INSTRUCTIONS -         aspirin  81 mg Oral Daily     DULoxetine  60 mg Oral Daily     furosemide  20 mg Intravenous BID     heparin ANTICOAGULANT  5,000 Units Subcutaneous Q12H     ipratropium - albuterol 0.5 mg/2.5 mg/3 mL  3 mL Nebulization 4x daily     piperacillin-tazobactam  2.25 g Intravenous Q6H     potassium chloride  20 mEq Oral or Feeding Tube Once     predniSONE  40 mg Oral Daily     sodium chloride (PF)  3 mL Intracatheter Q8H

## 2022-06-20 NOTE — PLAN OF CARE
Disoriented to time. Assist 1 w/ walker. Sleeping between cares. R PIV currently infusing Zosyn. 1 L NC. VSS. K protocol AM recheck. 1800 FR. Palliative consulted. Continue POC.

## 2022-06-21 NOTE — PROGRESS NOTES
"[facility]    Medicine Progress Note - Hospitalist Service    Date of Admission:  6/14/2022    Assessment & Plan                 See Spring is a 82 year old female with past medical history significant for generalized muscle weakness, hypokalemia, hypertension and failure to thrive who presented to [facility] on 6/14/2022 with weakness and was found to have new onset congestive heart failure.     Acute Metabolic Encephalopathy  Failure to Thrive  Currently unclear etiology. Patient more somnolent. Unable to appropriately assess orientation due to limited  resources, but family at bedside says patient not answering orientation questions appropriately. Suspect she may be dry/slightly overdiuresed with tenting of skin/wrinkles in skin. Held additional diuresis afternoon of 6/16/2022 with slight increased level of alertness. Patient still confused about situation, however. Repeating CXR with increased opacities concerning for possible underlying infectious source of encephalopathy. Started antibiotics as below. Does not have focal deficits concerning of possible intracranial pathology; CT head negative for acute findings. VBG pending as well. Ammonia normal. Patient not eating much and appears frail. Palliative care assisting with goals of care. Will hold NG tube discussion until Monday to see if patient's intake and mental status improve with antibiotics over the weekend.   6/18/22: Slightly improved level of alertness after antibiotics started yesterday.  6/19/22: Patient a bit more awake and interactive, but still very lethargic and eating minimally. Discussed at bedside with daughter and DIL via phone. Family asked patient about her goals, and she stated that she would not want feeding tubes and \"wants to go home.\" Family is leaning toward comfort focused cares and is amenable to finding a facility for the patient, as they stated that they do not have anyone at home " that can care for the patient 24/7. Will ask palliative to follow up with family tomorrow. Daughter-in-law David is decision maker for family (number in chart).     Acute Hypoxic Respiratory Failure  Probable Community-acquired vs Aspiration Pneumonia vs Cryptogenic Organizing Pneumonia  Oxygen requirements to 4-5L this morning. With new somnolence repeat CXR showing worsening bilateral opacities; originally suspected to be edema, but worsening despite diuresis. BCx pending. Starting Zosyn for concern of CAP vs aspiration in setting of somnolence. Possibly more awake after antibiotics. Adding prednisone due to concern for cyptogenic organizing pneumonia. Cancelling pulmonary consult due to patient plan for comfort-focused cares. CT with severe bilateral opacities.  6/19/22:     -Duonebs QID  - Now on comfort cares.     Diarrhea  Noted to have frequent diarrhea per nursing, but incontinent. Cdiff negative.     New Onset Congestive Heart Failure w/ Exacerbation  Moderate Pulmonary Hypertension  Increased weakness and generalized malaise. No reports of shortness of breath or swelling, but found to have NTproBNP 18,991 with slightly elevated troponin to 205. ECG without dynamic ST changes, but with some TWI in anterior leads. TTE with diastolic dysfunction and moderate pulmonary hypertension, but with preserved ejection fraction. Started on diuresis and heparin gtt on admission.  -Cardiology consulted, appreciate recommendations  -IV lasix.       Troponin Elevation  Troponin 205-->164. Suspect related to supply/demand mismatch in setting of heart failure exacerbation.     Acute Kidney Injury  Creatinine 1.49-->1.16-->1.06. Suspect related to renal congestion as improving with diuresis.     Acute Anemia  Hgb 11.3, decreased from 12.7 4/2022. Associated with decreased hematocrit consistent with hemodilution.     Hyponatremia, resolved    No won comfort cares, awaiting placement.             Diet: Regular Diet  Adult  Snacks/Supplements Adult: Ensure Enlive; Between Meals    DVT Prophylaxis: Pneumatic Compression Devices  Davis Catheter: Not present  Central Lines: None  Cardiac Monitoring: None  Code Status: No CPR- Do NOT Intubate      Disposition Plan   Expected Discharge: 06/22/2022     Anticipated discharge location:  Awaiting care coordination huddle  Delays:          The patient's care was discussed with the Patient's Family.    Kathleen Juarez MD  Hospitalist Service  [facility]  Securely message with the Vocera Web Console (learn more here)  Text page via [employer] Paging/Directory         Clinically Significant Risk Factors Present on Admission                    ______________________________________________________________________    Interval History     Lethargic.    Data reviewed today: I reviewed all medications, new labs and imaging results over the last 24 hours. I personally reviewed no images or EKG's today.    Physical Exam   Vital Signs:       Pulse: 82   Resp: 20 SpO2: 99 % O2 Device: Nasal cannula Oxygen Delivery: 1 LPM  Weight: 99 lbs 8 oz    General: Elderly female resting in bed.  Awake and pulling at lines/gown.  HEENT: Normocephalic, atraumatic.   Cardiac: Regular rate and rhythm.  1+ peripheral edema.  Respiratory: Normal work of breathing on NC. Bilateral mid-to-upper crackles improving.  GI: Normal, active bowel sounds.  Abdomen soft, nontender, nondistended.  : Deferred.  Musculoskeletal: Moving all extremities appropriately.  Skin: No rashes or abrasions on exposed skin.  Neurologic: Unable to assess orientation. CN II thought XII grossly intact.    Data   Recent Labs   Lab 06/20/22  0822 06/20/22  0555 06/18/22  1229 06/18/22  0657 06/17/22  0850 06/17/22  0548 06/16/22  1839 06/16/22  0510 06/15/22  0624 06/14/22  1634   WBC 8.3  --   --   --  10.4  --   --   --  10.3 11.2*   HGB 12.9  --   --   --  12.8  --   --   --  11.3* 11.3*   MCV 95  --   --   --  96  --   --    --  95 93     --   --   --  305  --   --   --  294 283     --   --   --   --  137  --  135 136 131*   POTASSIUM 3.0* 3.1* 3.9   < >  --  3.9  3.9   < > 3.0* 3.6 4.1   CHLORIDE 97  --   --   --   --  97  --  94 97 98   CO2 35*  --   --   --   --  31  --  34* 33* 30   BUN 18  --   --   --   --  24  --  24 27 33*   CR 0.98  --   --   --   --  1.01  --  1.07* 1.16* 1.49*   ANIONGAP 5  --   --   --   --  9  --  7 6 3   SHIRA 8.9  --   --   --   --  8.6  --  8.0* 8.2* 8.6   *  --   --   --   --  98  --  105* 119* 127*   ALBUMIN  --   --   --   --   --   --   --   --   --  3.1*   PROTTOTAL  --   --   --   --   --   --   --   --   --  7.2   BILITOTAL  --   --   --   --   --   --   --   --   --  0.6   ALKPHOS  --   --   --   --   --   --   --   --   --  133   ALT  --   --   --   --   --   --   --   --   --  37   AST  --   --   --   --   --   --   --   --   --  29   LIPASE  --   --   --   --   --   --   --   --   --  65*    < > = values in this interval not displayed.     No results found for this or any previous visit (from the past 24 hour(s)).  Medications     - MEDICATION INSTRUCTIONS -         DULoxetine  30 mg Oral Daily     furosemide  20 mg Intravenous BID     ipratropium - albuterol 0.5 mg/2.5 mg/3 mL  3 mL Nebulization 4x daily     sodium chloride (PF)  3 mL Intracatheter Q8H

## 2022-06-21 NOTE — PROGRESS NOTES
Care Management Follow Up    Length of Stay (days): 7    Expected Discharge Date: 06/22/2022     Concerns to be Addressed:  discharge planning     Patient plan of care discussed at interdisciplinary rounds: Yes    Anticipated Discharge Disposition: [facility]     Anticipated Discharge Services:  [facility]  Anticipated Discharge DME:  O2    Education Provided on the Discharge Plan:  yes  Patient/Family in Agreement with the Plan:  yes    Referrals Placed by CM/SW:  Skilled nursing facilities  Private pay costs discussed: Not applicable    Additional Information:  Reviewed pt's status and discussed in rounds. [facility] following for LTC placement with hospice.     Received call from Vandana in admissions at [facility] who stated they can accept pt and admit tomorrow.     Also received call from Amee stating [facility] can accept pt to their LTC.     Placed phone call to David p:[phone] to provide update. David talked with family and they would like to go with [facility] LTC. David requested medical transport be arranged. Discussed hospice agency and David does not have a preference, okay with using agency that facility uses.     Discussed with bedside RN who stated pt would be appropriate for a w/c ride. [facility] arranged [facility] w/c ride for tomorrow (6/22) @ 1030am.     Phone call placed to Suze with [facility] who stated they would be able to do an admission tomorrow. Suze will make sure O2 is ordered and will reach out to David to coordinate admission time.    Updated David, [facility], palliative, nursing and MD on discharge plan/time. Will need meds filled and sent with pt.    PAS created on: 6/21/2022 3:43 PM  Your confirmation number is:  [id number]    FERMIN Thomas, LSW  Inpatient Care Coordination  MS3, [facility]  [phone]    FERMIN Burks

## 2022-06-21 NOTE — PROGRESS NOTES
NUTRITION BRIEF NOTE  RD following for malnutrition. Upon chart review, comfort care election. RD to sign off. Please page/consult as needed.  Elida Evans RD, LD, [facility]  Pager - 3rd floor/ICU: [phone]  Pager - All other floors: [phone]  Pager - Weekend/holiday: [phone]  Office: [phone]

## 2022-06-21 NOTE — PLAN OF CARE
Goal Outcome Evaluation:        Pt A&O to self only. On Comfort cares. Receiving iv lasix, voiding well today. INC of urine at times. Up to BR with A1-2 and walker. LS dim. 02 removed, sating > 95% on RA. Available prn for comfort. Palliative following. Plan to discharge to [facility] on hospice tomorrow (Weds 6/22) at 1030 via wheel chair transport. Turned Q2hrs as pt wanted, and provided frequent oral cares. Will continue POC.         Writer spoke to pt's daughter in law at bedside and she had many questions regarding pt's condition, treatment regimen and diagnosis here in the hospital. She expressed great concern with families lack of understanding r/t language barrier. She requested md or palliative nurse to call prior to discharge at 1030.  Angelique [phone]

## 2022-06-21 NOTE — PLAN OF CARE
[id number]    Pt alert to self. Comfort care. Has not gotten up this shift. Resting well overnight. [facility]  utilized. Incontinent of bladder and bowel. Pt will discharge on hospice once placement is found. Will continue to monitor.

## 2022-06-21 NOTE — PLAN OF CARE
[id number]    A1 w/gb and walker. Disoriented to time/situation. Lethargic but alert. Hmong speaking,  used for assessment. Incontinent of B&B. Q2 repo. R PIV SL. LS dim. Hospice placement at discharge timeline is TBD    Seferino Rubio RN on 6/20/2022 at 10:00 PM

## 2022-06-22 NOTE — PROGRESS NOTES
Care Management Discharge Note    Discharge Date: 06/22/2022 @ 1030    Discharge Disposition:  [facility]    Discharge Services:  [facility]    Discharge DME:  Hospice to arrange - O2    Discharge Transportation:  [employer] w/c    Private pay costs discussed: Not applicable    PAS Confirmation Code: [id number]   Patient/family educated on Medicare website which has current facility and service quality ratings:      Education Provided on the Discharge Plan:  yes  Persons Notified of Discharge Plans: pt/pt's family, dtr in yamilet Kat, MD, palliative, Kelsey at [facility], Suze at [facility]  Patient/Family in Agreement with the Plan:  yes    Handoff Referral Completed: No    Additional Information:  Pt discharging at 1030am via [facility] w/c to [facility] with [facility]. Reviewed and faxed orders to [facility]. Placed phone call to Suze with [facility] who has epic access and will review orders. Comfort meds to be sent with pt/transport.     FERMIN Thomas, LSW  Inpatient Care Coordination  MS3, [facility]  [phone]    FERMIN Burks

## 2022-06-22 NOTE — DISCHARGE SUMMARY
[facility]  Hospitalist Discharge Summary      Date of Admission:  6/14/2022  Date of Discharge:  6/22/2022  Discharging Provider: Shawn Lyn MD  Discharge Service: Hospitalist Service    Discharge Diagnoses   Acute Metabolic Encephalopathy  Failure to Thrive  Acute Hypoxic Respiratory Failure  Probable Community-acquired vs Aspiration Pneumonia vs Cryptogenic Organizing Pneumonia  Diarrhea (C.diff negative)   New Onset Congestive Heart Failure w/ Exacerbation  Moderate Pulmonary Hypertension  Type 2 MI vs troponin elevation due to DALL[place]   Acute kidney injury  Acute anemia  Acute hyponatremia          Follow-ups Needed After Discharge   Follow-up Appointments     Follow Up and recommended labs and tests      Follow up with Nursing home physician.  No follow up labs or test are   needed.              Unresulted Labs Ordered in the Past 30 Days of this Admission     Date and Time Order Name Status Description    6/17/2022  7:58 AM Blood Culture Hand, Left Preliminary     6/17/2022  7:58 AM Blood Culture Arm, Right Preliminary       These results will be followed up by Elmira Ayala      Discharge Disposition   Discharged to long-term care facility  Condition at discharge: Poor  Patient ready to discharge to a skilled nursing facility as soon as possible in order to create capacity for patients related to the COVID-19 pandemic.    Hospital Course   Errol Londono is a 82 year old female with past medical history significant for generalized muscle weakness, hypokalemia, hypertension and failure to thrive who presented to [facility] on 6/14/2022 with weakness and was found to have new onset congestive heart failure.  She was found to be confused and more somnolent.  Was diagnosed with acute metabolic encephalopathy probably on the basis of pneumonia and was treated with Zosyn antibiotic therapy adjusted to renal dose.  He was also found to have congestive heart failure and was treated  with IV diuretic therapy.  She was seen by cardiology and with a BNP that was 18,991 and mildly elevated sensitive troponin of 205 echo showed diastolic dysfunction with moderate pulmonary hypertension was started on intravenous diuretic therapy and heparin drip.  Subsequently family decided at that given the overall picture that she should be comfort cares with hospice evaluation and treatment.  Patient is being discharged to skilled nursing facility with hospice care and comfort care measures.     Consultations This Hospital Stay   PHARMACY IP CONSULT  PHARMACY IP CONSULT  CARDIOLOGY IP CONSULT  PHARMACY IP CONSULT  PHARMACY IP CONSULT  PALLIATIVE CARE ADULT IP CONSULT  PULMONARY IP CONSULT  PALLIATIVE CARE ADULT IP CONSULT    Code Status   No CPR- Do NOT Intubate    Time Spent on this Encounter   I, Shawn Lyn MD, personally saw the patient today and spent greater than 30 minutes discharging this patient.       Shawn Lyn MD  [address] SURGICAL  [address] [place] [address]  Phone: [phone]  Fax: [phone]  ______________________________________________________________________    Physical Exam   Vital Signs: Temp: 97.8  F (36.6  C) Temp src: Axillary BP: 125/73 Pulse: 83   Resp: 18 SpO2: 90 % O2 Device: Nasal cannula Oxygen Delivery: 1 LPM  Weight: 99 lbs 8 oz  General: Elderly lady awake looking comfortable laying in bed  Heart S1-S2 regular rate and rhythm pedal edema is present  Lungs have decreased breath sounds in the lung bases I did not hear any crackles or wheezing  CNS patient is alert and able to assess orientation.        Primary Care Physician   Elmira Ayala    Discharge Orders      General info for SNF    Length of Stay Estimate: Long Term Care  Condition at Discharge: Declining  Level of care:skilled   Rehabilitation Potential: Poor  Admission H&P remains valid and up-to-date: Yes  Recent Chemotherapy: N/A  Use Nursing Home Standing Orders:  Yes     Intake and output    Every shift     Follow Up and recommended labs and tests    Follow up with Nursing home physician.  No follow up labs or test are needed.     Activity - Up with nursing assistance     Reason for your hospital stay    You came in with shortness of breath and was found to have CHF and pneumonia you and your family decided that you would be comfort cares only and has been discharged to nursing care facility with hospice consult     Additional Discharge Instructions    Follow-up with [facility]     No CPR- Do NOT Intubate     Fall precautions     Diet    Follow this diet upon discharge: Orders Placed This Encounter      Snacks/Supplements Adult: Ensure Enlive; Between Meals      Regular Diet Adult       Significant Results and Procedures   Most Recent 3 CBC's:Recent Labs   Lab Test 06/20/22  0822 06/17/22  0850 06/15/22  0624   WBC 8.3 10.4 10.3   HGB 12.9 12.8 11.3*   MCV 95 96 95    305 294     Most Recent 3 BMP's:Recent Labs   Lab Test 06/20/22  0822 06/20/22  0555 06/18/22  1229 06/18/22  0657 06/17/22  0548 06/16/22  1839 06/16/22  0510     --   --   --  137  --  135   POTASSIUM 3.0* 3.1* 3.9   < > 3.9  3.9   < > 3.0*   CHLORIDE 97  --   --   --  97  --  94   CO2 35*  --   --   --  31  --  34*   BUN 18  --   --   --  24  --  24   CR 0.98  --   --   --  1.01  --  1.07*   ANIONGAP 5  --   --   --  9  --  7   SHIRA 8.9  --   --   --  8.6  --  8.0*   *  --   --   --  98  --  105*    < > = values in this interval not displayed.     Most Recent 2 LFT's:Recent Labs   Lab Test 06/14/22  1634 04/17/22  0947   AST 29 27   ALT 37 45   ALKPHOS 133 101   BILITOTAL 0.6 1.1     Most Recent 3 Troponin's:Recent Labs   Lab Test 06/24/21  1910   TROPI 0.015     Most Recent 3 BNP's:Recent Labs   Lab Test 06/14/22  1634   NTBNPI 18,991*     Most Recent Urinalysis:Recent Labs   Lab Test 06/18/22  1755   COLOR Light Yellow   APPEARANCE Clear   URINEGLC Negative   URINEBILI  Negative   URINEKETONE Negative   SG 1.016   UBLD Negative   URINEPH 6.5   PROTEIN Negative   NITRITE Negative   LEUKEST Moderate*   RBCU 2   WBCU 1   ,   Results for orders placed or performed during the hospital encounter of 06/14/22   XR Chest Port 1 View    Narrative    XR CHEST PORT 1 VIEW   6/14/2022 4:50 PM     HISTORY: hypoxia, chest npressure    COMPARISON: Chest radiograph 4/17/2022      Impression    IMPRESSION: Markedly enlarged cardiac silhouette, similar to prior  exam. New multifocal alveolar opacities, differential includes  multifocal pneumonia, and severe pulmonary edema. Small bilateral  pleural effusions, right larger than left. No pneumothorax. Bones are  unchanged.    JENNIFER PÉREZ MD         SYSTEM ID:  [id number]   CT Head w/o Contrast    Narrative    CT OF THE HEAD WITHOUT CONTRAST 6/16/2022 8:11 PM     COMPARISON: Head CT 4/17/2022.    HISTORY: Mental status change, unknown cause.    TECHNIQUE: 5 mm thick axial CT images of the head were acquired  without IV contrast material.    FINDINGS: There is mild diffuse cerebral volume loss. There are  extensive confluent areas of decreased density in the cerebral white  matter bilaterally that are consistent with sequela of chronic small  vessel ischemic disease. Small peripheral wedge-shaped area of chronic  encephalomalacia at the inferoposterolateral right cerebral  hemisphere, consistent with a chronic ischemic infarct, is again noted  without change.    The ventricles and basal cisterns are within normal limits in  configuration given the degree of cerebral volume loss.  There is no  midline shift. There are no extra-axial fluid collections.    No intracranial hemorrhage, mass or recent infarct.    The visualized paranasal sinuses are well-aerated. There is no  mastoiditis. There are no fractures of the visualized bones.      Impression    IMPRESSION: Diffuse cerebral volume loss and cerebral white matter  changes consistent with chronic  small vessel ischemic disease. No  evidence for acute intracranial pathology. Possible small chronic  ischemic infarct in the right cerebellar hemisphere again noted.    Radiation dose for this scan was reduced using automated exposure  control, adjustment of the mA and/or kV according to patient size, or  iterative reconstruction technique.     AALIYAH JOSÉ MD         SYSTEM ID:  [id number]   XR Chest Port 1 View    Narrative    CHEST ONE VIEW  6/17/2022 8:40 AM     HISTORY: New oxygen requirements.    COMPARISON: 6/14/2022.      Impression    IMPRESSION: Increased upper lobe infiltrates compared to previous.  Similar left base infiltrate. Minimal improvement in right base  infiltrate.     NAWAF FOSTER MD         SYSTEM ID:  [id number]   CT Chest w/o Contrast    Narrative    EXAM: CT CHEST W/O CONTRAST  LOCATION: [facility]  DATE/TIME: 6/18/2022 10:30 AM    INDICATION: Hypoxic respiratory failure  COMPARISON: Chest x-ray 06/17/2022, neck CTA 06/24/2021  TECHNIQUE: CT chest without IV contrast. Multiplanar reformats were obtained. Dose reduction techniques were used.  CONTRAST: None.    FINDINGS:   LUNGS AND PLEURA: Bilateral consolidative opacities throughout the lungs. Some of these opacities in the left upper and left lower lobes have a crescentic appearance. Moderate right and trace left pleural effusion.    MEDIASTINUM/AXILLAE: Enlarged multinodular thyroid gland. Few prominent mediastinal lymph nodes with faint calcifications, likely related to prior granulomatous disease. Mild cardiomegaly. Enlarged main pulmonary artery measuring 3.9 cm, suggestive of   pulmonary arterial hypertension. No thoracic aortic aneurysm. Small hiatal hernia.    CORONARY ARTERY CALCIFICATION: Moderate.    UPPER ABDOMEN: No significant finding.    MUSCULOSKELETAL: No suspicious lesions in the bones.      Impression    IMPRESSION:   1.  Extensive bilateral consolidative opacities in both lungs are indeterminate  and could represent severe pulmonary edema, and/or atypical pneumonia or organizing pneumonia. Moderate right and small left pleural effusion.  2.  Generalized cardiomegaly. Enlarged main pulmonary artery suggestive of pulmonary arterial hypertension.  3.  Multinodular enlarged thyroid gland. Nonemergent thyroid ultrasound can be considered for better characterization.     Echocardiogram Complete     Value    LVEF  65-70%    [facility]    [id number]  [id number]  [id number]  [id number]^ROSEMARY^LETTY     [facility]  Echocardiography Laboratory  [address] [place] [address]     Name: SIMÓN KOCH  MRN: [id number]  : 1940  Study Date: 06/15/2022 08:11 AM  Age: 82 yrs  Gender: Female  Patient Location: [facility]  Reason For Study: CHF  Ordering Physician: LETTY RILEY  Referring Physician: Elmira Ayala  Performed By: Alison Castro RDCS     BSA: 1.5 m2  Height: 60 in  Weight: 116 lb  HR: 91  BP: 120/78 mmHg  ______________________________________________________________________________  Procedure  Complete Portable Echo Adult.  ______________________________________________________________________________  Interpretation Summary     Probably normal left ventricular systolic function. Contrast would enhance  wall motion analysis.  The visual ejection fraction is 65-70%.  Flattened septum is consistent with RV pressure overload.  The right ventricle is mildly dilated.  The right ventricular systolic function is moderately reduced.  There is mild to moderate (1-2+) tricuspid regurgitation.  Right ventricular systolic pressure is elevated, consistent with moderate  pulmonary hypertension.  Mild valvular aortic stenosis.  The ascending aorta is Mildly dilated.  The study was technically difficult.  ______________________________________________________________________________  Left Ventricle  The left ventricle is normal in size. There is normal left ventricular wall  thickness. Diastolic Doppler  findings (E/E' ratio and/or other parameters)  suggest left ventricular filling pressures are indeterminate. The visual  ejection fraction is 65-70%. Grade I or early diastolic dysfunction. Probably  normal left ventricular systolic function. Contrast would enhance wall motion  analysis. Flattened septum is consistent with RV pressure overload.     Right Ventricle  The right ventricle is mildly dilated. The right ventricular systolic function  is moderately reduced. Only the basal portion of the irght ventricle appears  to contract normally.     Atria  The left atrium is mildly dilated. The right atrium is mildly dilated. There  is no color Doppler evidence of an atrial shunt.     Mitral Valve  There is trace mitral regurgitation.     Tricuspid Valve  There is mild to moderate (1-2+) tricuspid regurgitation. The right  ventricular systolic pressure is approximated at 43mmHg plus the right atrial  pressure. IVC diameter >2.1 cm collapsing <50% with sniff suggests a high RA  pressure estimated at 15 mmHg or greater. Right ventricular systolic pressure  is elevated, consistent with moderate pulmonary hypertension.     Aortic Valve  The aortic valve is trileaflet. There is mild trileaflet aortic sclerosis. No  aortic regurgitation is present. The calculated aortic valve are is 1.9 cm^2.  The peak AoV pressure gradient is 20.0 mmHg. The mean AoV pressure gradient is  10.4 mmHg. Mild valvular aortic stenosis.     Pulmonic Valve  There is trace pulmonic valvular regurgitation.     Vessels  The aortic root is normal size. The ascending aorta is Mildly dilated.     Pericardium  There is no pericardial effusion.     Rhythm  Sinus rhythm was noted. The patient exhibited frequent PACs.  ______________________________________________________________________________  MMode/2D Measurements & Calculations  IVSd: 0.94 cm     LVIDd: 3.8 cm  LVIDs: 2.6 cm  LVPWd: 1.0 cm  FS: 30.9 %  LV mass(C)d: 111.7 grams  LV mass(C)dI: 75.4  grams/m2  Ao root diam: 2.8 cm  LA dimension: 4.0 cm  asc Aorta Diam: 3.8 cm  LA/Ao: 1.4  LVOT diam: 1.9 cm  LVOT area: 2.7 cm2  LA Volume (BP): 56.4 ml  LA Volume Index (BP): 38.1 ml/m2  RWT: 0.54     Doppler Measurements & Calculations  MV E max satinder: 60.6 cm/sec  MV A max satinder: 113.5 cm/sec  MV E/A: 0.53  MV dec time: 0.27 sec  Ao V2 max: 225.9 cm/sec  Ao max P.0 mmHg  Ao V2 mean: 148.9 cm/sec  Ao mean PG: 10.4 mmHg  Ao V2 VTI: 36.7 cm  MANGO(I,D): 1.9 cm2  MANGO(V,D): 1.7 cm2  LV V1 max P.1 mmHg  LV V1 max: 142.4 cm/sec  LV V1 VTI: 25.7 cm  SV(LVOT): 69.9 ml  SI(LVOT): 47.2 ml/m2  PA acc time: 0.03 sec  TR max satinder: 299.2 cm/sec  TR max P.9 mmHg  AV Satinder Ratio (DI): 0.63  MANGO Index (cm2/m2): 1.3  E/E' av.1  Lateral E/e': 8.2  Medial E/e': 13.9     ______________________________________________________________________________  Report approved by: Tom Rae 06/15/2022 10:11 AM               Discharge Medications   Current Discharge Medication List      START taking these medications    Details   acetaminophen (TYLENOL) 650 MG suppository Place 1 suppository (650 mg) rectally every 4 hours as needed for fever  Qty: 12 suppository, Refills: 0    Associated Diagnoses: Acute respiratory failure with hypoxia (H); Hospice care patient      LORazepam (ATIVAN) 1 MG tablet Place 1 tablet (1 mg) under the tongue every 3 hours as needed for anxiety  Qty: 16 tablet, Refills: 0    Comments: Please bubble pack. Discharge to facility on hospice.  Associated Diagnoses: Acute respiratory failure with hypoxia (H); Hospice care patient      oxyCODONE (ROXICODONE) 5 MG tablet Take 0.5 tablets (2.5 mg) by mouth every 4 hours as needed for pain (or dyspnea)  Qty: 12 tablet, Refills: 0    Associated Diagnoses: Acute respiratory failure with hypoxia (H); Hospice care patient         CONTINUE these medications which have CHANGED    Details   DULoxetine (CYMBALTA) 30 MG capsule Take 1 capsule (30 mg) by mouth  daily  Qty: 3 capsule, Refills: 0    Associated Diagnoses: Acute respiratory failure with hypoxia (H); Hospice care patient         STOP taking these medications       acetaminophen-codeine (TYLENOL #3) 300-30 MG tablet Comments:   Reason for Stopping:         amLODIPine (NORVASC) 5 MG tablet Comments:   Reason for Stopping:         atorvastatin (LIPITOR) 10 MG tablet Comments:   Reason for Stopping:         calcium carbonate (TUMS) 500 MG chewable tablet Comments:   Reason for Stopping:         cholecalciferol (VITAMIN D3) 125 mcg (5000 units) capsule Comments:   Reason for Stopping:         furosemide (LASIX) 20 MG tablet Comments:   Reason for Stopping:         MAGNESIUM PO Comments:   Reason for Stopping:         potassium phosphate, monobasic, (K-PHOS) 500 MG tablet Comments:   Reason for Stopping:             Allergies   Allergies   Allergen Reactions     Gabapentin Unknown

## 2022-06-22 NOTE — PLAN OF CARE
Pt alert. Oriented to self. Slept well tonight. Inconsistent with following directions. Mostly in bed but will ambulate to the bathroom. One assist with TAMERA and FAUSTO Reddy speaking. Denies pain. RPIV SL. Regular diet. Discharge today.

## 2022-06-22 NOTE — PROGRESS NOTES
Discharge packet printed and sent with patient via [facility] W/C transportation. Belonging verified and sent with patient.     Sonia Beckwith RN on 6/22/2022 at 10:37 AM

## 2022-06-23 NOTE — PROGRESS NOTES
Clinic Care Coordination Contact    Background: Care Coordination referral placed from [facility] discharge report for reason of patient meeting criteria for a TCM outreach call by [facility] team.    Assessment: Upon chart review, [facility] Team member will cancel/close the referral for TCM outreach due to reason below:    Patient is not established within [facility]. Upon chart review, [facility] Team member noted patient discharged to TCU    Plan: Care Coordination referral for TCM outreach canceled.    Jess Ruffin  [facility], [facility]

## 2022-06-27 NOTE — LETTER
6/27/2022        RE: Errol Londono  [address] [id number]        [facility] GERIATRICS    PRIMARY CARE PROVIDER AND CLINIC:  PRISCILA Cronin [facility], [address] / [place] [address]  Chief Complaint   Patient presents with     Hospital F/U      [facility] Medical Record Number:  [id number]  Place of Service where encounter took place:  [facility] ([facility]  () [[id number]]    See Spring  is a 82 year old  (1940), admitted to the above facility from  [facility]. Hospital stay 6/14/22 through 6/22/22.     HPI:    By chart review, presented to the ED for generalized muscle weakness, work-up remarkable for hypokalemia, hypertension, BNP 18 daily, mildly elevated sensitive troponin of 205, echo showed diastolic dysfunction, pulmonary hypertension with acute onset congestive heart failure.  Also with acute metabolic encephalopathy, and with prolonged lethargy and respiratory sx, CT showed severe bilateral opacities consistent with pneumonia, started on Zosyn, prednisone.  Given overall picture, functional decline, failure to thrive, family opted for hospice evaluation and comfort measures, discharged to long-term care facility for skilled nursing care and ongoing medical management.    Seen today in her room in long-term care, resting in bed. She awakens briefly to voice and touch, smiles, somewhat interactive with eye contact but nonverbal.  She appears comfortable.  No acute concerns per nursing.  [facility] following.    CODE STATUS/ADVANCE DIRECTIVES DISCUSSION:  No CPR- Do NOT Intubate  DNR / DNI  ALLERGIES:   Allergies   Allergen Reactions     Gabapentin Unknown      PAST MEDICAL HISTORY: No past medical history on file.   PAST SURGICAL HISTORY:   has a past surgical history that includes IR Biliary Tube Change (5/26/2011) and IR Transcatheter Biopsy (5/26/2011).  FAMILY HISTORY: family history includes No Known Problems in her father and  mother.  SOCIAL HISTORY:     Patient's living condition: lives in a skilled nursing facility    Post Discharge Medication Reconciliation Status: discharge medications reconciled, continue medications without change  Current Outpatient Medications   Medication Sig     acetaminophen (TYLENOL) 650 MG suppository Place 1 suppository (650 mg) rectally every 4 hours as needed for fever     bisacodyl (DULCOLAX) 10 MG suppository Place 10 mg rectally daily as needed for constipation     DULoxetine (CYMBALTA) 30 MG capsule Take 1 capsule (30 mg) by mouth daily     hyoscyamine 0.125 MG TBDP Take 0.125 mg by mouth every 4 hours as needed for cramping     LORazepam (ATIVAN) 1 MG tablet Place 1 tablet (1 mg) under the tongue every 3 hours as needed for anxiety (Patient taking differently: Place 0.5 mg under the tongue every 4 hours as needed for anxiety)     morphine 2.5 MG solu-tab Take 2.5 mg by mouth every hour as needed for shortness of breath / dyspnea or moderate to severe pain     oxyCODONE (ROXICODONE) 5 MG tablet Take 0.5 tablets (2.5 mg) by mouth every 4 hours as needed for pain (or dyspnea)     prochlorperazine (COMPAZINE) 10 MG tablet Take 10 mg by mouth every 6 hours as needed for nausea or vomiting     senna-docusate (SENOKOT-S/PERICOLACE) 8.6-50 MG tablet Take 1 tablet by mouth daily as needed for constipation     No current facility-administered medications for this visit.       ROS:  Unobtainable secondary to cognitive impairment.     Vitals:  /64   Pulse 88   Temp 97.9  F (36.6  C)   Resp 16   Ht 1.524 m (5')   Wt 43.6 kg (96 lb 3.2 oz)   SpO2 93%   BMI 18.79 kg/m    Exam:  GENERAL APPEARANCE:  Alert, in no distress  ENT:  Mouth and posterior oropharynx normal, moist mucous membranes  RESP:  respiratory effort and palpation of chest normal, no respiratory distress, diminished breath sounds Throughout  CV:  Palpation and auscultation of heart done , regular rate and rhythm, no murmur, rub, or  gallop, no edema  ABDOMEN:  normal bowel sounds, soft, nontender, no hepatosplenomegaly or other masses  M/S:   Gait and station abnormal JOSE swanson  SKIN:  Inspection of skin and subcutaneous tissue baseline, Palpation of skin and subcutaneous tissue baseline  NEURO:   Cranial nerves 2-12 are normal tested and grossly at patient's baseline, Somnolent  PSYCH:  Calm, appears comfortable    Lab/Diagnostic data:  Labs done in SNF are in [facility]. Please refer to them using [facility]/Care Everywhere., Recent labs in EPIC reviewed by me today.  and Patient is on hospice/palliative care and labs are not recommended    ASSESSMENT/PLAN:    (I50.9) New onset of congestive heart failure (H)  (primary encounter diagnosis)  (I27.20) Moderate pulmonary hypertension (H)  (M62.81) Generalized muscle weakness  Comment: Presented with increased weakness, generalized malaise.  Appears fatigued today but comfortable, no edema or shortness of breath.  - BNP 18 K+, troponin slightly elevated  - TTE showed diastolic function, moderate pulmonary hypertension.    -Diuresed with IV Lasix  Plan: Monitor fluid status, treat to comfort    (R62.7) Failure to thrive in adult  (G93.41) Metabolic encephalopathy  Comment: With little improvement inpatient with treatment of heart failure, infection.  CT head negative, ammonia normal.  Poor oral intake, patient was able to participate limitedly in goals of care discussions, expressed the desire to avoid exceeding 2, family opted for comfort measures  Plan: [facility] following, appreciate their involvement.  PRN available per their recommendations including morphine, lorazepam, antiemetics    (J96.01) Acute respiratory failure with hypoxia (H)  (J18.9) Community acquired pneumonia, unspecified laterality  Comment: Requiring oxygen inpatient, treated with Zosyn for CAP versus aspiration.  Prednisone ordered with concern for cytogenic organizing pneumonia, pulmonology consult canceled due to  comfort care.  - Not on oxygen today, appears comfortable  Plan: Monitor respiratory status, morphine as needed for shortness of breath    (R19.7) Diarrhea, unspecified type  Comment: Improving.  C. difficile negative.  Plan: Monitor bowel habits per nursing    (N17.9) Acute kidney injury (H)  Comment: Creatinine peaked at 1.49, improved with diuresis  - No labs for goals of care  Estimated Creatinine Clearance: 30.5 mL/min (based on SCr of 0.98 mg/dL).  Plan: Encourage fluids, renally dose medications as appropriate    (D64.9) Anemia, unspecified type  Comment: Suspected hemodilution, no active signs of bleeding  Plan: Monitor for signs and symptoms of bleeding, no labs per goals of care    Total time spent with patient visit at the skilled nursing facility was 35 min including patient visit and review of past records. Greater than 50% of total time spent with counseling and coordinating care due to collaboration with nursing regarding admission orders,  regarding hospice admission, discussion with patient regarding comfort focus and when to expect on admission to long-term care.     Electronically signed by:  PRISCILA Cronin CNP                       Sincerely,        PRISCILA Cronin CNP

## 2022-06-27 NOTE — PROGRESS NOTES
[facility] GERIATRICS    PRIMARY CARE PROVIDER AND CLINIC:  PRISCILA Cronin CNP, [address] [facility] [address] / [place] [address]  Chief Complaint   Patient presents with     Hospital F/U      [facility] Medical Record Number:  [id number]  Place of Service where encounter took place:  [facility]  () [[id number]]    Errol Londono  is a 82 year old  (1940), admitted to the above facility from  [facility]. Hospital stay 6/14/22 through 6/22/22.     HPI:    By chart review, presented to the ED for generalized muscle weakness, work-up remarkable for hypokalemia, hypertension, BNP 18 daily, mildly elevated sensitive troponin of 205, echo showed diastolic dysfunction, pulmonary hypertension with acute onset congestive heart failure.  Also with acute metabolic encephalopathy, and with prolonged lethargy and respiratory sx, CT showed severe bilateral opacities consistent with pneumonia, started on Zosyn, prednisone.  Given overall picture, functional decline, failure to thrive, family opted for hospice evaluation and comfort measures, discharged to long-term care facility for skilled nursing care and ongoing medical management.    Seen today in her room in long-term care, resting in bed. She awakens briefly to voice and touch, smiles, somewhat interactive with eye contact but nonverbal.  She appears comfortable.  No acute concerns per nursing.  [facility] following.    CODE STATUS/ADVANCE DIRECTIVES DISCUSSION:  No CPR- Do NOT Intubate  DNR / DNI  ALLERGIES:   Allergies   Allergen Reactions     Gabapentin Unknown      PAST MEDICAL HISTORY: No past medical history on file.   PAST SURGICAL HISTORY:   has a past surgical history that includes IR Biliary Tube Change (5/26/2011) and IR Transcatheter Biopsy (5/26/2011).  FAMILY HISTORY: family history includes No Known Problems in her father and mother.  SOCIAL HISTORY:     Patient's living condition: lives in a skilled nursing  facility    Post Discharge Medication Reconciliation Status: discharge medications reconciled, continue medications without change  Current Outpatient Medications   Medication Sig     acetaminophen (TYLENOL) 650 MG suppository Place 1 suppository (650 mg) rectally every 4 hours as needed for fever     bisacodyl (DULCOLAX) 10 MG suppository Place 10 mg rectally daily as needed for constipation     DULoxetine (CYMBALTA) 30 MG capsule Take 1 capsule (30 mg) by mouth daily     hyoscyamine 0.125 MG TBDP Take 0.125 mg by mouth every 4 hours as needed for cramping     LORazepam (ATIVAN) 1 MG tablet Place 1 tablet (1 mg) under the tongue every 3 hours as needed for anxiety (Patient taking differently: Place 0.5 mg under the tongue every 4 hours as needed for anxiety)     morphine 2.5 MG solu-tab Take 2.5 mg by mouth every hour as needed for shortness of breath / dyspnea or moderate to severe pain     oxyCODONE (ROXICODONE) 5 MG tablet Take 0.5 tablets (2.5 mg) by mouth every 4 hours as needed for pain (or dyspnea)     prochlorperazine (COMPAZINE) 10 MG tablet Take 10 mg by mouth every 6 hours as needed for nausea or vomiting     senna-docusate (SENOKOT-S/PERICOLACE) 8.6-50 MG tablet Take 1 tablet by mouth daily as needed for constipation     No current facility-administered medications for this visit.       ROS:  Unobtainable secondary to cognitive impairment.     Vitals:  /64   Pulse 88   Temp 97.9  F (36.6  C)   Resp 16   Ht 1.524 m (5')   Wt 43.6 kg (96 lb 3.2 oz)   SpO2 93%   BMI 18.79 kg/m    Exam:  GENERAL APPEARANCE:  Alert, in no distress  ENT:  Mouth and posterior oropharynx normal, moist mucous membranes  RESP:  respiratory effort and palpation of chest normal, no respiratory distress, diminished breath sounds Throughout  CV:  Palpation and auscultation of heart done , regular rate and rhythm, no murmur, rub, or gallop, no edema  ABDOMEN:  normal bowel sounds, soft, nontender, no hepatosplenomegaly or  other masses  M/S:   Gait and station abnormal JOSE swanson  SKIN:  Inspection of skin and subcutaneous tissue baseline, Palpation of skin and subcutaneous tissue baseline  NEURO:   Cranial nerves 2-12 are normal tested and grossly at patient's baseline, Somnolent  PSYCH:  Calm, appears comfortable    Lab/Diagnostic data:  [facility]. Please refer to them using EPIC/Care Everywhere., Recent labs in EPIC reviewed by me today.  and Patient is on hospice/palliative care and labs are not recommended    ASSESSMENT/PLAN:    (I50.9) New onset of congestive heart failure (H)  (primary encounter diagnosis)  (I27.20) Moderate pulmonary hypertension (H)  (M62.81) Generalized muscle weakness  Comment: Presented with increased weakness, generalized malaise.  Appears fatigued today but comfortable, no edema or shortness of breath.  - BNP 18 K+, troponin slightly elevated  - TTE showed diastolic function, moderate pulmonary hypertension.    -Diuresed with IV Lasix  Plan: Monitor fluid status, treat to comfort    (R62.7) Failure to thrive in adult  (G93.41) Metabolic encephalopathy  Comment: With little improvement inpatient with treatment of heart failure, infection.  CT head negative, ammonia normal.  Poor oral intake, patient was able to participate limitedly in goals of care discussions, expressed the desire to avoid exceeding 2, family opted for comfort measures  Plan: [facility] following, appreciate their involvement.  PRN available per their recommendations including morphine, lorazepam, antiemetics    (J96.01) Acute respiratory failure with hypoxia (H)  (J18.9) Community acquired pneumonia, unspecified laterality  Comment: Requiring oxygen inpatient, treated with Zosyn for CAP versus aspiration.  Prednisone ordered with concern for cytogenic organizing pneumonia, pulmonology consult canceled due to comfort care.  - Not on oxygen today, appears comfortable  Plan: Monitor respiratory status,  morphine as needed for shortness of breath    (R19.7) Diarrhea, unspecified type  Comment: Improving.  C. difficile negative.  Plan: Monitor bowel habits per nursing    (N17.9) Acute kidney injury (H)  Comment: Creatinine peaked at 1.49, improved with diuresis  - No labs for goals of care  Estimated Creatinine Clearance: 30.5 mL/min (based on SCr of 0.98 mg/dL).  Plan: Encourage fluids, renally dose medications as appropriate    (D64.9) Anemia, unspecified type  Comment: Suspected hemodilution, no active signs of bleeding  Plan: Monitor for signs and symptoms of bleeding, no labs per goals of care    Total time spent with patient visit at the skilled nursing facility was 35 min including patient visit and review of past records. Greater than 50% of total time spent with counseling and coordinating care due to collaboration with nursing regarding admission orders,  regarding hospice admission, discussion with patient regarding comfort focus and when to expect on admission to long-term care.     Electronically signed by:  PRISCILA Cronin CNP

## 2022-07-15 NOTE — LETTER
7/15/2022        RE: Errol Londono  [address] [address]        Errol Londono is a 82 year old H[place] female seen July 15, 2022 at [facility] where she has resided for one month (admit 2022) seen for initial visit.  Pt is seen in her room lying abed in a hospital gown, staring out the window.   She makes eye contact but is not verbal, so not able to obtain any history from patient    By chart review, pt has HTN, vascular disease and depression.   Pt was followed in [facility] clinic, not consistently taking prescribed medications.     In 2021 pt was seen in the [facility] ED for acute neurologic changes with confusion and aphasia   Symptoms resolved in ED and thought to represent a TIA.     In 2022 pt presented to [facility] hospitalization with weakness, poor oral intake and electrolyte abnormalities.  Again not verbal  In 2022 pt had an 8 day hospitalization for metabolic encephalopathy and failure to thrive.  Found to be hypoxic, requiring 6 L/m of O2 supplementation, and thought to be secondary to CAP vs aspiration pneumonitis vs cryptogenic organizing pneumonia.  She was treated with Zoxyn and IV diuretics   BNP 18, 991 and troponin mildly elevated.    ECHO showed diastolic dysfunction and pulmonary HTN.   After discussion, family decided on comfort cares and she discharged to LT on Hospice.        PMH:   Osteoporosis  Urinary incontinence  Prediabetes  RLS  HTN  GERD  OA  Depression  H/o gallstone pancreatitis,    Pulmonary HTN   HFpEF,    Deementia    Past Surgical History:   Procedure Laterality Date     IR BILIARY TUBE CHANGE  2011     IR TRANSCATHETER BIOPSY  2011     Family History   Problem Relation Age of Onset     No Known Problems Mother,       No Known Problems Father,       SH:  Previously lived with her daughter Tawny and her family, house in [place]  Pt has 10 children     Review Of Systems  Skin: negative   Eyes: impaired  vision  Ears/Nose/Throat: hearing loss  Respiratory: No shortness of breath, dyspnea on exertion, cough, or hemoptysis  Cardiovascular: dyspnea on exertion and exercise intolerance  Gastrointestinal: dysphagia, vomiting and poor oral intake  Genitourinary: incontinence  Musculoskeletal: previously ambulatory with walker  Neurologic: dementia  Psychiatric: depression  Hematologic/Lymphatic/Immunologic: negative  Endocrine: diabetes   Weight was 128 lbs in 2013  Wt Readings from Last 5 Encounters:   07/15/22 43.3 kg (95 lb 6.4 oz)   06/27/22 43.6 kg (96 lb 3.2 oz)   06/20/22 45.1 kg (99 lb 8 oz)   04/17/22 52.2 kg (115 lb)   06/24/21 61.2 kg (135 lb)      GENERAL APPEARANCE: alert and no distress  /80   Pulse 84   Temp 98.1  F (36.7  C)   Resp 16   Ht 1.524 m (5')   Wt 43.3 kg (95 lb 6.4 oz)   SpO2 93%   BMI 18.63 kg/m     HEENT: normocephalic, no lesion or abnormalities, edentulous  NECK: no adenopathy, no asymmetry, masses, or scars and thyroid normal to palpation  RESP: decreased BS, no rales or wheeze  CV: regular rate and rhythm, normal S1 S2, +ectopy and a pause  ABDOMEN:  soft, nontender, no HSM or masses and bowel sounds normal  MS: extremities normal- no gross deformities noted, no ext edema  SKIN: no suspicious lesions or rashes  NEURO: Not verbal  PSYCH: affect okay  LYMPHATICS: No cervical,  or supraclavicular nodes     Last Comprehensive Metabolic Panel:  Sodium   Date Value Ref Range Status   06/20/2022 137 133 - 144 mmol/L Final     Potassium   Date Value Ref Range Status   06/20/2022 3.0 (L) 3.4 - 5.3 mmol/L Final     Carbon Dioxide (CO2)   Date Value Ref Range Status   06/20/2022 35 (H) 20 - 32 mmol/L Final     Glucose   Date Value Ref Range Status   06/20/2022 117 (H) 70 - 99 mg/dL Final     Urea Nitrogen   Date Value Ref Range Status   06/20/2022 18 7 - 30 mg/dL Final     Creatinine   Date Value Ref Range Status   06/20/2022 0.98 0.52 - 1.04 mg/dL Final     GFR Estimate   Date Value Ref  Range Status   06/20/2022 57 (L) >60 mL/min/1.73m2 Final     Calcium   Date Value Ref Range Status   06/20/2022 8.9 8.5 - 10.1 mg/dL Final     Lab Results   Component Value Date    AST 29 06/14/2022      ALBUMIN 3.1 06/14/2022      ALKPHOS 133 06/14/2022     Lab Results   Component Value Date    WBC 8.3 06/20/2022      HGB 12.9 06/20/2022      MCV 95 06/20/2022       06/20/2022     Lab Results   Component Value Date    A1C 5.5 04/17/2022     CT CHEST W/O CONTRAST   6/18/2022    INDICATION: Hypoxic respiratory failure  LUNGS AND PLEURA: Bilateral consolidative opacities throughout the lungs. Some of these opacities in the left upper and left lower lobes have a crescentic appearance. Moderate right and trace left pleural effusion.  MEDIASTINUM/AXILLAE: Enlarged multinodular thyroid gland. Few prominent mediastinal lymph nodes with faint calcifications, likely related to prior granulomatous disease. Mild cardiomegaly. Enlarged main pulmonary artery measuring 3.9 cm, suggestive of pulmonary arterial hypertension. No thoracic aortic aneurysm. Small hiatal hernia.  CORONARY ARTERY CALCIFICATION: Moderate.                                                              IMPRESSION:   1.  Extensive bilateral consolidative opacities in both lungs are indeterminate and could represent severe pulmonary edema, and/or atypical pneumonia or organizing pneumonia. Moderate right and small left pleural effusion.  2.  Generalized cardiomegaly. Enlarged main pulmonary artery suggestive of pulmonary arterial hypertension.  3.  Multinodular enlarged thyroid gland. Nonemergent thyroid ultrasound can be considered for better characterization.     CT OF THE HEAD WITHOUT CONTRAST 6/16/2022   HISTORY: Mental status change, unknown cause.  FINDINGS: There is mild diffuse cerebral volume loss. There are  extensive confluent areas of decreased density in the cerebral white  matter bilaterally that are consistent with sequela of chronic  small  vessel ischemic disease. Small peripheral wedge-shaped area of chronic  encephalomalacia at the inferoposterolateral right cerebral  hemisphere, consistent with a chronic ischemic infarct, is again noted without change.  The ventricles and basal cisterns are within normal limits in  configuration given the degree of cerebral volume loss.  There is no midline shift. There are no extra-axial fluid collections.  No intracranial hemorrhage, mass or recent infarct.                                                              IMPRESSION: Diffuse cerebral volume loss and cerebral white matter  changes consistent with chronic small vessel ischemic disease. No  evidence for acute intracranial pathology. Possible small chronic  ischemic infarct in the right cerebellar hemisphere again noted.    ECHO 6/14/2022    Probably normal left ventricular systolic function. Contrast would enhance wall motion analysis.  The visual ejection fraction is 65-70%.  Flattened septum is consistent with RV pressure overload.  The right ventricle is mildly dilated.  The right ventricular systolic function is moderately reduced.   There is mild to moderate (1-2+) tricuspid regurgitation.  Right ventricular systolic pressure is elevated, consistent with moderate pulmonary hypertension.  Mild valvular aortic stenosis.    The ascending aorta is Mildly dilated.      IMP/PLAN:   (I27.20) Moderate pulmonary hypertension (H)   (I50.9) New onset of congestive heart failure (H)  Comment: acute exacerbation with high BMP in the hospital.   IV diuresis with improvement in symptoms and oxygenation     Stable volume status on exam today     Plan: not on failure regimen secondary to goals of care.   Would use furosemide if she is uncomfortably dyspneic.       (F33.9) Recurrent major depressive disorder, remission status unspecified (H)  Comment: >10 years by hx  Plan: duloxetine 30 mg/day       (R62.7) Failure to thrive in adult  (M62.81) Generalized  muscle weakness  Comment: weight loss, decreased mobility    Plan: food preferences, supportive care     (I67.9) Cerebral vascular disease  (F03.90) Dementia without behavioral disturbance, unspecified dementia type (H)  Comment: no formal testing on EHR, but clear dementia by chart review and history   Plan: LTC support for med admin, meals, activity and ADLs.        (Z51.5) Hospice care patient  Comment: PRNs available for treating any symptoms   Plan: Followed by [facility]     Vianey German MD         Sincerely,        Vianey German MD

## 2022-07-31 PROBLEM — F33.9 RECURRENT MAJOR DEPRESSIVE DISORDER, REMISSION STATUS UNSPECIFIED (H): Status: ACTIVE | Noted: 2022-01-01

## 2022-07-31 NOTE — PROGRESS NOTES
Errol Londono is a 82 year old [place] female seen July 15, 2022 at [facility] where she has resided for one month (admit 2022) seen for initial visit.  Pt is seen in her room lying abed in a hospital gown, staring out the window.   She makes eye contact but is not verbal, so not able to obtain any history from patient    By chart review, pt has HTN, vascular disease and depression.   Pt was followed in [facility] clinic, not consistently taking prescribed medications.     In 2021 pt was seen in the [facility] ED for acute neurologic changes with confusion and aphasia   Symptoms resolved in ED and thought to represent a TIA.     In 2022 pt presented to [facility] hospitalization with weakness, poor oral intake and electrolyte abnormalities.  Again not verbal  In 2022 pt had an 8 day hospitalization for metabolic encephalopathy and failure to thrive.  Found to be hypoxic, requiring 6 L/m of O2 supplementation, and thought to be secondary to CAP vs aspiration pneumonitis vs cryptogenic organizing pneumonia.  She was treated with Zoxyn and IV diuretics   BNP 18, 991 and troponin mildly elevated.    ECHO showed diastolic dysfunction and pulmonary HTN.   After discussion, family decided on comfort cares and she discharged to LT on Hospice.        PMH:   Osteoporosis  Urinary incontinence  Prediabetes  RLS  HTN  GERD  OA  Depression  H/o gallstone pancreatitis,    Pulmonary HTN   HFpEF,    Deementia    Past Surgical History:   Procedure Laterality Date     IR BILIARY TUBE CHANGE  2011     IR TRANSCATHETER BIOPSY  2011     Family History   Problem Relation Age of Onset     No Known Problems Mother,       No Known Problems Father,       SH:  Previously lived with her daughter Tawny and her family, house in [place]  Pt has 10 children     Review Of Systems  Skin: negative   Eyes: impaired vision  Ears/Nose/Throat: hearing loss  Respiratory: No shortness of breath,  dyspnea on exertion, cough, or hemoptysis  Cardiovascular: dyspnea on exertion and exercise intolerance  Gastrointestinal: dysphagia, vomiting and poor oral intake  Genitourinary: incontinence  Musculoskeletal: previously ambulatory with walker  Neurologic: dementia  Psychiatric: depression  Hematologic/Lymphatic/Immunologic: negative  Endocrine: diabetes   Weight was 128 lbs in 2013  Wt Readings from Last 5 Encounters:   07/15/22 43.3 kg (95 lb 6.4 oz)   06/27/22 43.6 kg (96 lb 3.2 oz)   06/20/22 45.1 kg (99 lb 8 oz)   04/17/22 52.2 kg (115 lb)   06/24/21 61.2 kg (135 lb)      GENERAL APPEARANCE: alert and no distress  /80   Pulse 84   Temp 98.1  F (36.7  C)   Resp 16   Ht 1.524 m (5')   Wt 43.3 kg (95 lb 6.4 oz)   SpO2 93%   BMI 18.63 kg/m     HEENT: normocephalic, no lesion or abnormalities, edentulous  NECK: no adenopathy, no asymmetry, masses, or scars and thyroid normal to palpation  RESP: decreased BS, no rales or wheeze  CV: regular rate and rhythm, normal S1 S2, +ectopy and a pause  ABDOMEN:  soft, nontender, no HSM or masses and bowel sounds normal  MS: extremities normal- no gross deformities noted, no ext edema  SKIN: no suspicious lesions or rashes  NEURO: Not verbal  PSYCH: affect okay  LYMPHATICS: No cervical,  or supraclavicular nodes     Last Comprehensive Metabolic Panel:  Sodium   Date Value Ref Range Status   06/20/2022 137 133 - 144 mmol/L Final     Potassium   Date Value Ref Range Status   06/20/2022 3.0 (L) 3.4 - 5.3 mmol/L Final     Carbon Dioxide (CO2)   Date Value Ref Range Status   06/20/2022 35 (H) 20 - 32 mmol/L Final     Glucose   Date Value Ref Range Status   06/20/2022 117 (H) 70 - 99 mg/dL Final     Urea Nitrogen   Date Value Ref Range Status   06/20/2022 18 7 - 30 mg/dL Final     Creatinine   Date Value Ref Range Status   06/20/2022 0.98 0.52 - 1.04 mg/dL Final     GFR Estimate   Date Value Ref Range Status   06/20/2022 57 (L) >60 mL/min/1.73m2 Final     Calcium   Date  Value Ref Range Status   06/20/2022 8.9 8.5 - 10.1 mg/dL Final     Lab Results   Component Value Date    AST 29 06/14/2022      ALBUMIN 3.1 06/14/2022      ALKPHOS 133 06/14/2022     Lab Results   Component Value Date    WBC 8.3 06/20/2022      HGB 12.9 06/20/2022      MCV 95 06/20/2022       06/20/2022     Lab Results   Component Value Date    A1C 5.5 04/17/2022     CT CHEST W/O CONTRAST   6/18/2022    INDICATION: Hypoxic respiratory failure  LUNGS AND PLEURA: Bilateral consolidative opacities throughout the lungs. Some of these opacities in the left upper and left lower lobes have a crescentic appearance. Moderate right and trace left pleural effusion.  MEDIASTINUM/AXILLAE: Enlarged multinodular thyroid gland. Few prominent mediastinal lymph nodes with faint calcifications, likely related to prior granulomatous disease. Mild cardiomegaly. Enlarged main pulmonary artery measuring 3.9 cm, suggestive of pulmonary arterial hypertension. No thoracic aortic aneurysm. Small hiatal hernia.  CORONARY ARTERY CALCIFICATION: Moderate.                                                              IMPRESSION:   1.  Extensive bilateral consolidative opacities in both lungs are indeterminate and could represent severe pulmonary edema, and/or atypical pneumonia or organizing pneumonia. Moderate right and small left pleural effusion.  2.  Generalized cardiomegaly. Enlarged main pulmonary artery suggestive of pulmonary arterial hypertension.  3.  Multinodular enlarged thyroid gland. Nonemergent thyroid ultrasound can be considered for better characterization.     CT OF THE HEAD WITHOUT CONTRAST 6/16/2022   HISTORY: Mental status change, unknown cause.  FINDINGS: There is mild diffuse cerebral volume loss. There are  extensive confluent areas of decreased density in the cerebral white  matter bilaterally that are consistent with sequela of chronic small  vessel ischemic disease. Small peripheral wedge-shaped area of  chronic  encephalomalacia at the inferoposterolateral right cerebral  hemisphere, consistent with a chronic ischemic infarct, is again noted without change.  The ventricles and basal cisterns are within normal limits in  configuration given the degree of cerebral volume loss.  There is no midline shift. There are no extra-axial fluid collections.  No intracranial hemorrhage, mass or recent infarct.                                                              IMPRESSION: Diffuse cerebral volume loss and cerebral white matter  changes consistent with chronic small vessel ischemic disease. No  evidence for acute intracranial pathology. Possible small chronic  ischemic infarct in the right cerebellar hemisphere again noted.    ECHO 6/14/2022    Probably normal left ventricular systolic function. Contrast would enhance wall motion analysis.  The visual ejection fraction is 65-70%.  Flattened septum is consistent with RV pressure overload.  The right ventricle is mildly dilated.  The right ventricular systolic function is moderately reduced.   There is mild to moderate (1-2+) tricuspid regurgitation.  Right ventricular systolic pressure is elevated, consistent with moderate pulmonary hypertension.  Mild valvular aortic stenosis.    The ascending aorta is Mildly dilated.      IMP/PLAN:   (I27.20) Moderate pulmonary hypertension (H)   (I50.9) New onset of congestive heart failure (H)  Comment: acute exacerbation with high BMP in the hospital.   IV diuresis with improvement in symptoms and oxygenation     Stable volume status on exam today     Plan: not on failure regimen secondary to goals of care.   Would use furosemide if she is uncomfortably dyspneic.       (F33.9) Recurrent major depressive disorder, remission status unspecified (H)  Comment: >10 years by hx  Plan: duloxetine 30 mg/day       (R62.7) Failure to thrive in adult  (M62.81) Generalized muscle weakness  Comment: weight loss, decreased mobility    Plan: food  preferences, supportive care     (I67.9) Cerebral vascular disease  (F03.90) Dementia without behavioral disturbance, unspecified dementia type (H)  Comment: no formal testing on EHR, but clear dementia by chart review and history   Plan: LTC support for med admin, meals, activity and ADLs.        (Z51.5) Hospice care patient  Comment: PRNs available for treating any symptoms   Plan: Followed by [facility]     Vianey German MD

## 2022-08-01 NOTE — TELEPHONE ENCOUNTER
See Spring is a 82 year old  (1940), Nurse called today to report: head lice     ASSESSMENT/PLAN  Permethrin 1% shampoo ordered, facility isolation protocol  Electronically signed by:   Karlee Caceres CNP

## 2022-08-01 NOTE — LETTER
8/1/2022        RE: See Spring  [address] [address]        [facility] GERIATRICS  Chief Complaint   Patient presents with     assisted Regulatory     [place] Medical Record Number:  [id number]  Place of Service where encounter took place:  [facility]  () [[id number]]    By chart review, hospitalized at [facility] 6/14-6/22/22 for generalized muscle weakness, work-up remarkable for hypokalemia, hypertension, BNP 18 daily, mildly elevated sensitive troponin of 205, echo showed diastolic dysfunction, pulmonary hypertension with acute onset congestive heart failure.  Also with acute metabolic encephalopathy, and with prolonged lethargy and respiratory sx, CT showed severe bilateral opacities consistent with pneumonia, started on Zosyn, prednisone.  Given overall picture, functional decline, failure to thrive, family opted for hospice evaluation and comfort measures, discharged to long-term care facility for skilled nursing care and admitted to [facility].     HPI:    See Spring  is 82 year old (1940), who is being seen today for a federally mandated E/M visit. Today's concerns are: Seen today for follow up on multiple medical conditions, nursing noted lice in her hair last night and Nix was ordered per on call provider. Also concerned about a red spot on her chest. Seen today in her room in LTC, lying in bed late morning. She awakens to verbal stimuli, non-verbal, somewhat interactive with eye contact. She pushes me away on exam. No erythema noted on her chest. Unable to obtain HPI/ROS due to dementia. No additional acute concerns from nursing.    ALLERGIES:Gabapentin  PAST MEDICAL HISTORY: No past medical history on file.  PAST SURGICAL HISTORY:   has a past surgical history that includes IR Biliary Tube Change (5/26/2011) and IR Transcatheter Biopsy (5/26/2011).  FAMILY HISTORY: family history includes No Known Problems in her father and mother.  SOCIAL HISTORY:       MEDICATIONS:     Review of your medicines          Accurate as of August 1, 2022 11:59 PM. If you have any questions, ask your nurse or doctor.            CONTINUE these medicines which may have CHANGED, or have new prescriptions. If we are uncertain of the size of tablets/capsules you have at home, strength may be listed as something that might have changed.      Dose / Directions   LORazepam 1 MG tablet  Commonly known as: ATIVAN  This may have changed:     how much to take    when to take this  Used for: Acute respiratory failure with hypoxia (H), Hospice care patient      Dose: 1 mg  Place 1 tablet (1 mg) under the tongue every 3 hours as needed for anxiety  Quantity: 16 tablet  Refills: 0        CONTINUE these medicines which have NOT CHANGED      Dose / Directions   acetaminophen 650 MG suppository  Commonly known as: TYLENOL  Used for: Acute respiratory failure with hypoxia (H), Hospice care patient      Dose: 650 mg  Place 1 suppository (650 mg) rectally every 4 hours as needed for fever  Quantity: 12 suppository  Refills: 0     bisacodyl 10 MG suppository  Commonly known as: DULCOLAX      Dose: 10 mg  Place 10 mg rectally daily as needed for constipation  Refills: 0     DULoxetine 30 MG capsule  Commonly known as: CYMBALTA  Indication: For pain  Used for: Acute respiratory failure with hypoxia (H), Hospice care patient      Dose: 30 mg  Take 1 capsule (30 mg) by mouth daily  Quantity: 3 capsule  Refills: 0     hyoscyamine 0.125 MG Tbdp      Dose: 0.125 mg  Take 0.125 mg by mouth every 4 hours as needed for cramping  Refills: 0     morphine 2.5 MG solu-tab      Dose: 2.5 mg  Take 2.5 mg by mouth every hour as needed for shortness of breath / dyspnea or moderate to severe pain  Refills: 0     oxyCODONE 5 MG tablet  Commonly known as: ROXICODONE  Used for: Acute respiratory failure with hypoxia (H), Hospice care patient      Dose: 2.5 mg  Take 0.5 tablets (2.5 mg) by mouth every 4 hours as needed for pain  (or dyspnea)  Quantity: 12 tablet  Refills: 0     prochlorperazine 10 MG tablet  Commonly known as: COMPAZINE      Dose: 10 mg  Take 10 mg by mouth every 6 hours as needed for nausea or vomiting  Refills: 0     senna-docusate 8.6-50 MG tablet  Commonly known as: SENOKOT-S/PERICOLACE      Dose: 1 tablet  Take 1 tablet by mouth daily as needed for constipation  Refills: 0           Case Management:  I have reviewed the care plan and MDS and do agree with the plan. Patient's desire to return to the community is not assessible due to cognitive impairment. Information reviewed:  Medications, vital signs, orders, and nursing notes.    ROS:  Unobtainable secondary to cognitive impairment.     Vitals:  /78   Pulse 82   Temp 97.4  F (36.3  C)   Resp 18   Ht 1.524 m (5')   Wt 43.3 kg (95 lb 6.4 oz)   SpO2 93%   BMI 18.63 kg/m    Body mass index is 18.63 kg/m .  Exam:  GENERAL APPEARANCE:  Alert, in no distress  RESP:  respiratory effort and palpation of chest normal, lungs clear to auscultation , no respiratory distress  CV:  Palpation and auscultation of heart done , regular rate and rhythm, no murmur, rub, or gallop, no edema  ABDOMEN:  normal bowel sounds, soft, nontender, no hepatosplenomegaly or other masses  M/S:   Gait and station abnormal transfers with assist, primarily bed bound  SKIN:  Inspection of skin and subcutaneous tissue baseline, Palpation of skin and subcutaneous tissue baseline  NEURO:   Cranial nerves 2-12 are normal tested and grossly at patient's baseline  PSYCH:  insight and judgement impaired, memory impaired , un-cooperative    Lab/Diagnostic data:   Patient is on hospice/palliative care and labs are not recommended    ASSESSMENT/PLAN  (I27.20) Moderate pulmonary hypertension (H)  (primary encounter diagnosis)  (I50.9) New onset of congestive heart failure (H)  Comment: New diagnoses in most recent hospitalization, volume status stable and she appears free of related symptoms.  Plan:  Not on CHF medications 2/2 goals of care. Consider lasix if symptomatic. Comfort orders per hospice.    (R62.7) Failure to thrive in adult  (F03.90) Dementia without behavioral disturbance, unspecified dementia type (H)  Comment: weight loss, poor functional status  Plan: offer food preferences, supplements per RD, SNF for assist with ADLs, medicaiton management, meals, activities.    (Z51.5) Hospice care patient  Comment: Due to the above chronic conditions and per goals of care. Appears comfortable.  Plan: [facility] hospice, comfort regimen per their team.    (B85.2) Lice infested hair  Comment: Acute, presumably brought in by visitors, no other recent cases in facility. Provided clarification to Nix orders at nursing request. Appears asymptomatic on exam today.  Plan: Nix apply and let sit x 10 minutes then rinse out, repeat in one week if nits persist. Precautions per facility protocol.       Electronically signed by:  PRISCILA Cronin CNP              Sincerely,        PRISCILA Cronin CNP

## 2022-08-01 NOTE — PROGRESS NOTES
[facility] GERIATRICS  Chief Complaint   Patient presents with     USP Regulatory     [place] Medical Record Number:  [id number]  Place of Service where encounter took place:  [facility]  () [[id number]]    By chart review, hospitalized at [facility] 6/14-6/22/22 for generalized muscle weakness, work-up remarkable for hypokalemia, hypertension, BNP 18 daily, mildly elevated sensitive troponin of 205, echo showed diastolic dysfunction, pulmonary hypertension with acute onset congestive heart failure.  Also with acute metabolic encephalopathy, and with prolonged lethargy and respiratory sx, CT showed severe bilateral opacities consistent with pneumonia, started on Zosyn, prednisone.  Given overall picture, functional decline, failure to thrive, family opted for hospice evaluation and comfort measures, discharged to long-term care facility for skilled nursing care and admitted to [facility].     HPI:    See Spring  is 82 year old (1940), who is being seen today for a federally mandated E/M visit. Today's concerns are: Seen today for follow up on multiple medical conditions, nursing noted lice in her hair last night and Nix was ordered per on call provider. Also concerned about a red spot on her chest. Seen today in her room in LTC, lying in bed late morning. She awakens to verbal stimuli, non-verbal, somewhat interactive with eye contact. She pushes me away on exam. No erythema noted on her chest. Unable to obtain HPI/ROS due to dementia. No additional acute concerns from nursing.    ALLERGIES:Gabapentin  PAST MEDICAL HISTORY: No past medical history on file.  PAST SURGICAL HISTORY:   has a past surgical history that includes IR Biliary Tube Change (5/26/2011) and IR Transcatheter Biopsy (5/26/2011).  FAMILY HISTORY: family history includes No Known Problems in her father and mother.  SOCIAL HISTORY:      MEDICATIONS:     Review of your medicines          Accurate as of August 1, 2022  11:59 PM. If you have any questions, ask your nurse or doctor.            CONTINUE these medicines which may have CHANGED, or have new prescriptions. If we are uncertain of the size of tablets/capsules you have at home, strength may be listed as something that might have changed.      Dose / Directions   LORazepam 1 MG tablet  Commonly known as: ATIVAN  This may have changed:     how much to take    when to take this  Used for: Acute respiratory failure with hypoxia (H), Hospice care patient      Dose: 1 mg  Place 1 tablet (1 mg) under the tongue every 3 hours as needed for anxiety  Quantity: 16 tablet  Refills: 0        CONTINUE these medicines which have NOT CHANGED      Dose / Directions   acetaminophen 650 MG suppository  Commonly known as: TYLENOL  Used for: Acute respiratory failure with hypoxia (H), Hospice care patient      Dose: 650 mg  Place 1 suppository (650 mg) rectally every 4 hours as needed for fever  Quantity: 12 suppository  Refills: 0     bisacodyl 10 MG suppository  Commonly known as: DULCOLAX      Dose: 10 mg  Place 10 mg rectally daily as needed for constipation  Refills: 0     DULoxetine 30 MG capsule  Commonly known as: CYMBALTA  Indication: For pain  Used for: Acute respiratory failure with hypoxia (H), Hospice care patient      Dose: 30 mg  Take 1 capsule (30 mg) by mouth daily  Quantity: 3 capsule  Refills: 0     hyoscyamine 0.125 MG Tbdp      Dose: 0.125 mg  Take 0.125 mg by mouth every 4 hours as needed for cramping  Refills: 0     morphine 2.5 MG solu-tab      Dose: 2.5 mg  Take 2.5 mg by mouth every hour as needed for shortness of breath / dyspnea or moderate to severe pain  Refills: 0     oxyCODONE 5 MG tablet  Commonly known as: ROXICODONE  Used for: Acute respiratory failure with hypoxia (H), Hospice care patient      Dose: 2.5 mg  Take 0.5 tablets (2.5 mg) by mouth every 4 hours as needed for pain (or dyspnea)  Quantity: 12 tablet  Refills: 0     prochlorperazine 10 MG  tablet  Commonly known as: COMPAZINE      Dose: 10 mg  Take 10 mg by mouth every 6 hours as needed for nausea or vomiting  Refills: 0     senna-docusate 8.6-50 MG tablet  Commonly known as: SENOKOT-S/PERICOLACE      Dose: 1 tablet  Take 1 tablet by mouth daily as needed for constipation  Refills: 0           Case Management:  I have reviewed the care plan and MDS and do agree with the plan. Patient's desire to return to the community is not assessible due to cognitive impairment. Information reviewed:  Medications, vital signs, orders, and nursing notes.    ROS:  Unobtainable secondary to cognitive impairment.     Vitals:  /78   Pulse 82   Temp 97.4  F (36.3  C)   Resp 18   Ht 1.524 m (5')   Wt 43.3 kg (95 lb 6.4 oz)   SpO2 93%   BMI 18.63 kg/m    Body mass index is 18.63 kg/m .  Exam:  GENERAL APPEARANCE:  Alert, in no distress  RESP:  respiratory effort and palpation of chest normal, lungs clear to auscultation , no respiratory distress  CV:  Palpation and auscultation of heart done , regular rate and rhythm, no murmur, rub, or gallop, no edema  ABDOMEN:  normal bowel sounds, soft, nontender, no hepatosplenomegaly or other masses  M/S:   Gait and station abnormal transfers with assist, primarily bed bound  SKIN:  Inspection of skin and subcutaneous tissue baseline, Palpation of skin and subcutaneous tissue baseline  NEURO:   Cranial nerves 2-12 are normal tested and grossly at patient's baseline  PSYCH:  insight and judgement impaired, memory impaired , un-cooperative    Lab/Diagnostic data:   Patient is on hospice/palliative care and labs are not recommended    ASSESSMENT/PLAN  (I27.20) Moderate pulmonary hypertension (H)  (primary encounter diagnosis)  (I50.9) New onset of congestive heart failure (H)  Comment: New diagnoses in most recent hospitalization, volume status stable and she appears free of related symptoms.  Plan: Not on CHF medications 2/2 goals of care. Consider lasix if symptomatic.  Comfort orders per hospice.    (R62.7) Failure to thrive in adult  (F03.90) Dementia without behavioral disturbance, unspecified dementia type (H)  Comment: weight loss, poor functional status  Plan: offer food preferences, supplements per RD, SNF for assist with ADLs, medicaiton management, meals, activities.    (Z51.5) Hospice care patient  Comment: Due to the above chronic conditions and per goals of care. Appears comfortable.  Plan: [facility] hospice, comfort regimen per their team.    (B85.2) Lice infested hair  Comment: Acute, presumably brought in by visitors, no other recent cases in facility. Provided clarification to Nix orders at nursing request. Appears asymptomatic on exam today.  Plan: Nix apply and let sit x 10 minutes then rinse out, repeat in one week if nits persist. Precautions per facility protocol.       Electronically signed by:  PRISCILA Cronin CNP

## 2022-09-23 NOTE — LETTER
9/23/2022        RE: See Spring  [address] [address]        No notes on file      Sincerely,        Vianey German MD

## 2022-10-05 NOTE — PROGRESS NOTES
Errol Londono is a 82 year old Hmong female seen September 23, 2022 at [facility] where she has resided for 3 months (admit 6/2022)  Pt is seen on the unit up to [facility].   She does not respond verbally but puts her hand out to be held.     Pt was treated for head lice in July, no recurrence or evidence of infestation today.   No other concerns reported by nursing staff.        By chart review, pt has HTN, vascular disease and depression.   Pt was followed in [facility] clinic, not consistently taking prescribed medications.     In June 2021 pt was seen in the [facility] ED for acute neurologic changes with confusion and aphasia   Symptoms resolved in ED and thought to represent a TIA.     In April 2022 pt presented to [facility] hospitalization with weakness, poor oral intake and electrolyte abnormalities.  Again not verbal  In June 2022 pt had an 8 day hospitalization for metabolic encephalopathy and failure to thrive.  Found to be hypoxic, requiring 6 L/m of O2 supplementation, which was thought to be secondary to CAP vs aspiration pneumonitis vs cryptogenic organizing pneumonia.  She was treated with Zosyn and IV diuretics   BNP 18, 991 and troponin mildly elevated.    ECHO showed diastolic dysfunction and pulmonary HTN.   After discussion, family decided on comfort cares and she discharged to LTC on Hospice.        PMH:   Osteoporosis  Urinary incontinence  Prediabetes  RLS  HTN  GERD  OA  Depression  H/o gallstone pancreatitis, 2011   Pulmonary HTN   HFpEF, 2022   Deementia    Past Surgical History:   Procedure Laterality Date     IR BILIARY TUBE CHANGE  5/26/2011     IR TRANSCATHETER BIOPSY  5/26/2011     SH:  Previously lived with her daughter Tawny and her family, house in [place]  Pt has 10 children     Review Of Systems  Gastrointestinal: dysphagia, vomiting and poor oral intake  Genitourinary: incontinence  Weight was 128 lbs in 2013  Wt Readings from Last 5 Encounters:   09/22/22 41.5 kg (91 lb 9.6  oz)   08/01/22 43.3 kg (95 lb 6.4 oz)   07/15/22 43.3 kg (95 lb 6.4 oz)   06/27/22 43.6 kg (96 lb 3.2 oz)   06/20/22 45.1 kg (99 lb 8 oz)     EXAM: frail, NAD  /83   Pulse 84   Temp 96.9  F (36.1  C)   Resp 18   Ht 1.524 m (5')   Wt 41.5 kg (91 lb 9.6 oz)   SpO2 92%   BMI 17.89 kg/m     Neck supple without adenopathy  Lungs with decreased BS, no rales or wheeze   Heart RRR s1s2    Abd soft, NT, no distention or guarding, +BS  Ext without edema  Neuro: non verbal, WC bound  Psych: affect okay, appears comfortable        Labs reviewed, unremarkable in June 2022        CT OF THE HEAD WITHOUT CONTRAST 6/16/2022                                                         IMPRESSION: Diffuse cerebral volume loss and cerebral white matter  changes consistent with chronic small vessel ischemic disease. No  evidence for acute intracranial pathology. Possible small chronic  ischemic infarct in the right cerebellar hemisphere again noted.    ECHO 6/14/2022    Probably normal left ventricular systolic function. Contrast would enhance wall motion analysis.  The visual ejection fraction is 65-70%.  Flattened septum is consistent with RV pressure overload.  The right ventricle is mildly dilated.  The right ventricular systolic function is moderately reduced.   There is mild to moderate (1-2+) tricuspid regurgitation.  Right ventricular systolic pressure is elevated, consistent with moderate pulmonary hypertension.  Mild valvular aortic stenosis.    The ascending aorta is Mildly dilated.      IMP/PLAN:   (I27.20) Moderate pulmonary hypertension (H)   (I50.9) New onset of congestive heart failure (H)  Comment: acute exacerbation with high BMP in June hospitalization.   IV diuresis with improvement in symptoms and oxygenation     Stable volume status on exam today and weight is down      Plan: not on failure regimen secondary to goals of care.   Would use furosemide if she is uncomfortably dyspneic.       (F33.9) Recurrent  major depressive disorder, remission status unspecified (H)  Comment: >10 years by hx  Plan: duloxetine 30 mg/day       (R62.7) Failure to thrive in adult  (M62.81) Generalized muscle weakness  Comment: weight loss has continued, eating is variable.    Plan: food preferences, supportive care with comfort focus    (I67.9) Cerebral vascular disease  (G30.9,  F01.50,  F02.80) Mixed dementia (H)  Comment: no formal testing on EHR, but clear dementia by chart review and history   Plan: LTC support for med admin, meals, activity and ADLs.        (Z51.5) Hospice care patient  Comment: PRNs available for treating any symptoms   Plan: Followed by [facility]     Vianey German MD

## 2022-11-11 NOTE — PROGRESS NOTES
[facility] GERIATRICS  Chief Complaint   [facility]     [facility]:  [id number]  Place of Service where encounter took place:  [facility]  () [[id number]]    HPI:    See Spring  is 82 year old (1940), who is being seen today for a federally mandated E/M visit.     By chart review, hospitalized at [facility] 6/14-6/22/22 for generalized muscle weakness, work-up remarkable for hypokalemia, hypertension, BNP 18 daily, mildly elevated sensitive troponin of 205, echo showed diastolic dysfunction, pulmonary hypertension with acute onset congestive heart failure.  Also with acute metabolic encephalopathy, and with prolonged lethargy and respiratory sx, CT showed severe bilateral opacities consistent with pneumonia, started on Zosyn, prednisone.  Given overall picture, functional decline, failure to thrive, family opted for hospice evaluation and comfort measures, discharged to long-term care facility for skilled nursing care and admitted to [facility].  Per hospice and nursing staff, patient is generally improved since admission to facility, out of bed for meals and able to feed herself.     Today's concerns are:  Seen today for follow up on multiple conditions up in her broda chair on the unit in LTC. She is resting in her chair, interactive with eye contact with stimulation, does not respond to questions via  alecia. She appears comfortable. No acute concerns per nursing or Hospice RN, started on Morphine last week at bedtime with concerns for generalized pain and discomfort. Weight 96 at admission, down to 89, now stable 91-93 lbs.     ALLERGIES:Gabapentin  PAST MEDICAL HISTORY: No past medical history on file.  PAST SURGICAL HISTORY:   has a past surgical history that includes IR Biliary Tube Change (5/26/2011) and IR Transcatheter Biopsy (5/26/2011).  FAMILY HISTORY: family history includes No Known Problems in her father and  mother.  SOCIAL HISTORY:      MEDICATIONS:       Review of your medicines          Accurate as of November 11, 2022 11:59 PM. If you have any questions, ask your nurse or doctor.            CONTINUE these medicines which may have CHANGED, or have new prescriptions. If we are uncertain of the size of tablets/capsules you have at home, strength may be listed as something that might have changed.      Dose / Directions   LORazepam 1 MG tablet  Commonly known as: ATIVAN  This may have changed:     how much to take    when to take this  Used for: Acute respiratory failure with hypoxia (H), Hospice care patient      Dose: 1 mg  Place 1 tablet (1 mg) under the tongue every 3 hours as needed for anxiety  Quantity: 16 tablet  Refills: 0        CONTINUE these medicines which have NOT CHANGED      Dose / Directions   acetaminophen 650 MG suppository  Commonly known as: TYLENOL  Used for: Acute respiratory failure with hypoxia (H), Hospice care patient      Dose: 650 mg  Place 1 suppository (650 mg) rectally every 4 hours as needed for fever  Quantity: 12 suppository  Refills: 0     bisacodyl 10 MG suppository  Commonly known as: DULCOLAX      Dose: 10 mg  Place 10 mg rectally daily as needed for constipation  Refills: 0     DULoxetine 30 MG capsule  Commonly known as: CYMBALTA  Indication: For pain  Used for: Acute respiratory failure with hypoxia (H), Hospice care patient      Dose: 30 mg  Take 1 capsule (30 mg) by mouth daily  Quantity: 3 capsule  Refills: 0     hyoscyamine 0.125 MG Tbdp      Dose: 0.125 mg  Take 0.125 mg by mouth every 4 hours as needed for cramping  Refills: 0     morphine 2.5 MG solu-tab      Dose: 2.5 mg  Take 2.5 mg by mouth every hour as needed for shortness of breath / dyspnea or moderate to severe pain  Refills: 0     oxyCODONE 5 MG tablet  Commonly known as: ROXICODONE  Used for: Acute respiratory failure with hypoxia (H), Hospice care patient      Dose: 2.5 mg  Take 0.5 tablets (2.5 mg) by mouth  every 4 hours as needed for pain (or dyspnea)  Quantity: 12 tablet  Refills: 0     permethrin 1 % external liquid  Commonly known as: NIX      Apply topically once And repeat once in one week PRN  Refills: 0     prochlorperazine 10 MG tablet  Commonly known as: COMPAZINE      Dose: 10 mg  Take 10 mg by mouth every 6 hours as needed for nausea or vomiting  Refills: 0     senna-docusate 8.6-50 MG tablet  Commonly known as: SENOKOT-S/PERICOLACE      Dose: 1 tablet  Take 1 tablet by mouth daily as needed for constipation  Refills: 0             Case Management:  I have reviewed the care plan and MDS and do agree with the plan. Patient's desire to return to the community is not assessible due to cognitive impairment. Information reviewed:  Medications, vital signs, orders, and nursing notes.    ROS:  Unobtainable secondary to cognitive impairment.     Vitals:  /79   Pulse 77   Temp 97.7  F (36.5  C)   Resp 19   Ht 1.524 m (5')   Wt 41.8 kg (92 lb 3.2 oz)   SpO2 96%   BMI 18.01 kg/m    Body mass index is 18.01 kg/m .  Exam:  GENERAL APPEARANCE:  Alert, in no distress, thin, chronically ill and frail appearing  RESP:  respiratory effort and palpation of chest normal, lungs clear to auscultation , no respiratory distress  CV:  Palpation and auscultation of heart done , regular rate and rhythm, no murmur, rub, or gallop, no edema  ABDOMEN:  normal bowel sounds, soft, nontender, no hepatosplenomegaly or other masses  M/S:   Gait and station abnormal transfers with assist, wheelchair for mobility  SKIN:  Inspection of skin and subcutaneous tissue baseline, Palpation of skin and subcutaneous tissue baseline  NEURO:   Cranial nerves 2-12 are normal tested and grossly at patient's baseline, garcia spontaneously  PSYCH:  insight and judgement impaired, memory impaired     Lab/Diagnostic data:   Patient is on hospice/palliative care and labs are not recommended    ASSESSMENT/PLAN  (I67.9) Cerebral vascular disease   (primary encounter diagnosis)  (G30.9,  F01.50,  F02.80) Mixed dementia (H)  (Z51.5) Hospice care patient  Comment: Low functional status, impaired communication on hospice care and has generally stabilized in SNF setting.  Plan: SNF for assist with ADLs, medication management, meals, activities. Offer food preferences, comfort focus, treat to comfort per hospice    (I27.20) Moderate pulmonary hypertension (H)  (I50.9) Chronic congestive heart failure, unspecified heart failure type (H)  Comment: Acute exacerbation in June hospitalization, new diagnosis at that time. Not on HF regimen per goals of care, appears euvolemic and no reports of dyspnea.  Plan: Monitor exam, weights, treat to comfort per hospice.     Electronically signed by:  PRISCILA Cronin CNP

## 2022-12-14 NOTE — PROGRESS NOTES
[facility] GERIATRICS    Chief Complaint   Patient presents with     RECHECK     HPI:  See Spring is a 82 year old  (1940), who is being seen today for an episodic care visit at: [facility]  () [[id number]].     Brief summary (per prior provider): hospitalized at [facility] 6/14-6/22/22 for generalized muscle weakness, work-up remarkable for hypokalemia, hypertension, BNP 18 daily, mildly elevated sensitive troponin of 205, echo showed diastolic dysfunction, pulmonary hypertension with acute onset congestive heart failure.  Also with acute metabolic encephalopathy, and with prolonged lethargy and respiratory sx, CT showed severe bilateral opacities consistent with pneumonia, started on Zosyn, prednisone.  Given overall picture, functional decline, failure to thrive, family opted for hospice evaluation and comfort measures, discharged to long-term care facility for skilled nursing care and admitted to [facility].  Per hospice and nursing staff, patient is generally improved since admission to facility, out of bed for meals and able to feed herself.     Patient has been residing in LTC at this facility, recently was identified to have acute infection with COVID-19 on 12/8/22. She is seen today in follow-up. On arrival, pt is resting in bed taking a nap, does not respond to questions via . No acute concerns from facility staff, appears to be tolerating infection well.      Allergies, and PMH/PSH reviewed in EPIC today.  REVIEW OF SYSTEMS:  4 point ROS including Respiratory, CV, GI and , other than that noted in the HPI,  is negative    Objective:   BP (!) 154/72   Pulse 77   Temp 98.6  F (37  C)   Resp 18   Ht 1.524 m (5')   Wt 41.7 kg (92 lb)   SpO2 93%   BMI 17.97 kg/m      GEN: well-developed, frail elderly female, appears comfortable  HEENT: NCAT, nose & mouth patent, mucous membranes dry  CHEST: lungs CTA bilaterally, no increased work of breathing  HEART: RRR, S1 & S2,  extremities well perfused  ABD: soft, nondistended  NEURO: awake. Sensation grossly intact to light touch.   SKIN: warm & dry without rash, no pedal edema    Patient is on hospice/palliative care and labs are not recommended    Assessment/Plan:    Acute infection with COVID-19  Tested positive 12/8. Appears asymptomatic, saturations have been stable without changes in hemodynamics. Does not participate in interview, but clinically appears comfortable.  -Monitor symptoms  -Supportive management    Cerebral vascular disease  Dementia  Hospice care pt  -SNF for assist with ADLs, med management, meals, activities  -Comfort-based approach    Chronic CHF  Pulmonary HTN  Hx acute exacerbation in 06/2022, new diagnosis at that time. No longer on regimen in line with GOC.  -Treat to comfort per hospice    MED REC REQUIRED  Post Medication Reconciliation Status: medication reconcilation previously completed during another office visit      Orders:  NNO    Electronically signed by: Fidel Bernardo PA-C

## 2023-01-01 ENCOUNTER — LAB REQUISITION (OUTPATIENT)
Dept: LAB | Facility: CLINIC | Age: 83
End: 2023-01-01
Payer: MEDICARE

## 2023-01-01 ENCOUNTER — NURSING HOME VISIT (OUTPATIENT)
Dept: GERIATRICS | Facility: CLINIC | Age: 83
End: 2023-01-01
Payer: MEDICARE

## 2023-01-01 ENCOUNTER — LAB REQUISITION (OUTPATIENT)
Dept: LAB | Facility: CLINIC | Age: 83
End: 2023-01-01
Payer: COMMERCIAL

## 2023-01-01 ENCOUNTER — HOSPITAL ENCOUNTER (OUTPATIENT)
Facility: CLINIC | Age: 83
Setting detail: OBSERVATION
End: 2023-06-21
Attending: EMERGENCY MEDICINE | Admitting: INTERNAL MEDICINE
Payer: MEDICARE

## 2023-01-01 ENCOUNTER — LAB REQUISITION (OUTPATIENT)
Dept: LAB | Facility: CLINIC | Age: 83
End: 2023-01-01
Payer: OTHER MISCELLANEOUS

## 2023-01-01 ENCOUNTER — TELEPHONE (OUTPATIENT)
Dept: GERIATRICS | Facility: CLINIC | Age: 83
End: 2023-01-01
Payer: OTHER MISCELLANEOUS

## 2023-01-01 ENCOUNTER — NURSING HOME VISIT (OUTPATIENT)
Dept: GERIATRICS | Facility: CLINIC | Age: 83
End: 2023-01-01
Payer: OTHER MISCELLANEOUS

## 2023-01-01 ENCOUNTER — TELEPHONE (OUTPATIENT)
Dept: GERIATRICS | Facility: CLINIC | Age: 83
End: 2023-01-01

## 2023-01-01 VITALS
TEMPERATURE: 96.8 F | BODY MASS INDEX: 14.4 KG/M2 | WEIGHT: 71.43 LBS | HEART RATE: 125 BPM | DIASTOLIC BLOOD PRESSURE: 90 MMHG | HEIGHT: 59 IN | OXYGEN SATURATION: 95 % | SYSTOLIC BLOOD PRESSURE: 119 MMHG | RESPIRATION RATE: 20 BRPM

## 2023-01-01 VITALS
TEMPERATURE: 97.6 F | DIASTOLIC BLOOD PRESSURE: 68 MMHG | RESPIRATION RATE: 18 BRPM | HEIGHT: 60 IN | BODY MASS INDEX: 15.67 KG/M2 | SYSTOLIC BLOOD PRESSURE: 114 MMHG | OXYGEN SATURATION: 97 % | HEART RATE: 80 BPM | WEIGHT: 79.8 LBS

## 2023-01-01 VITALS
OXYGEN SATURATION: 95 % | BODY MASS INDEX: 18.28 KG/M2 | HEIGHT: 60 IN | TEMPERATURE: 97.8 F | HEART RATE: 77 BPM | RESPIRATION RATE: 17 BRPM | DIASTOLIC BLOOD PRESSURE: 85 MMHG | WEIGHT: 93.1 LBS | SYSTOLIC BLOOD PRESSURE: 134 MMHG

## 2023-01-01 VITALS
OXYGEN SATURATION: 96 % | BODY MASS INDEX: 14.76 KG/M2 | RESPIRATION RATE: 16 BRPM | DIASTOLIC BLOOD PRESSURE: 84 MMHG | HEIGHT: 60 IN | WEIGHT: 75.2 LBS | SYSTOLIC BLOOD PRESSURE: 130 MMHG | HEART RATE: 52 BPM | TEMPERATURE: 98 F

## 2023-01-01 VITALS
WEIGHT: 93.1 LBS | BODY MASS INDEX: 18.28 KG/M2 | TEMPERATURE: 97.8 F | HEART RATE: 77 BPM | HEIGHT: 60 IN | DIASTOLIC BLOOD PRESSURE: 85 MMHG | RESPIRATION RATE: 17 BRPM | SYSTOLIC BLOOD PRESSURE: 134 MMHG | OXYGEN SATURATION: 95 %

## 2023-01-01 VITALS
BODY MASS INDEX: 15.9 KG/M2 | WEIGHT: 81 LBS | OXYGEN SATURATION: 95 % | SYSTOLIC BLOOD PRESSURE: 116 MMHG | DIASTOLIC BLOOD PRESSURE: 67 MMHG | HEIGHT: 60 IN | HEART RATE: 74 BPM | RESPIRATION RATE: 18 BRPM | TEMPERATURE: 98.2 F

## 2023-01-01 DIAGNOSIS — U07.1 COVID-19: ICD-10-CM

## 2023-01-01 DIAGNOSIS — F01.50 MIXED DEMENTIA (H): ICD-10-CM

## 2023-01-01 DIAGNOSIS — R62.7 FAILURE TO THRIVE IN ADULT: ICD-10-CM

## 2023-01-01 DIAGNOSIS — F33.9 RECURRENT MAJOR DEPRESSIVE DISORDER, REMISSION STATUS UNSPECIFIED (H): ICD-10-CM

## 2023-01-01 DIAGNOSIS — I50.32 CHRONIC HEART FAILURE WITH PRESERVED EJECTION FRACTION (H): Primary | ICD-10-CM

## 2023-01-01 DIAGNOSIS — Z53.9 ERRONEOUS ENCOUNTER--DISREGARD: Primary | ICD-10-CM

## 2023-01-01 DIAGNOSIS — Z51.5 HOSPICE CARE PATIENT: ICD-10-CM

## 2023-01-01 DIAGNOSIS — K92.2 GASTROINTESTINAL HEMORRHAGE, UNSPECIFIED GASTROINTESTINAL HEMORRHAGE TYPE: ICD-10-CM

## 2023-01-01 DIAGNOSIS — G30.9 MIXED DEMENTIA (H): ICD-10-CM

## 2023-01-01 DIAGNOSIS — F02.80 MIXED DEMENTIA (H): ICD-10-CM

## 2023-01-01 DIAGNOSIS — I50.32 CHRONIC HEART FAILURE WITH PRESERVED EJECTION FRACTION (H): ICD-10-CM

## 2023-01-01 DIAGNOSIS — R63.4 WEIGHT LOSS: ICD-10-CM

## 2023-01-01 DIAGNOSIS — I27.20 MODERATE PULMONARY HYPERTENSION (H): ICD-10-CM

## 2023-01-01 DIAGNOSIS — I10 ESSENTIAL HYPERTENSION: ICD-10-CM

## 2023-01-01 DIAGNOSIS — I67.9 CEREBRAL VASCULAR DISEASE: ICD-10-CM
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
## 2023-01-01 DIAGNOSIS — I67.9 CEREBRAL VASCULAR DISEASE: Primary | ICD-10-CM

## 2023-01-01 LAB
ABO/RH(D): NORMAL
ALBUMIN SERPL BCG-MCNC: 3.3 G/DL (ref 3.5–5.2)
ALP SERPL-CCNC: 85 U/L (ref 35–104)
ALT SERPL W P-5'-P-CCNC: 24 U/L (ref 0–50)
ANION GAP BLD CALCULATED.3IONS-SCNC: 17 MMOL/L (ref 3–14)
ANION GAP BLD CALCULATED.3IONS-SCNC: 20 MMOL/L (ref 3–14)
ANION GAP SERPL CALCULATED.3IONS-SCNC: 10 MMOL/L (ref 7–15)
ANTIBODY SCREEN: NEGATIVE
AST SERPL W P-5'-P-CCNC: 27 U/L (ref 0–45)
BASOPHILS # BLD AUTO: 0 10E3/UL (ref 0–0.2)
BASOPHILS NFR BLD AUTO: 1 %
BILIRUB SERPL-MCNC: 0.4 MG/DL
BLD PROD TYP BPU: NORMAL
BLOOD COMPONENT TYPE: NORMAL
BUN SERPL-MCNC: 28.1 MG/DL (ref 8–23)
BUN SERPL-MCNC: 29 MG/DL (ref 7–30)
BUN SERPL-MCNC: 30 MG/DL (ref 7–30)
CA-I BLD-MCNC: 4.3 MG/DL (ref 4.4–5.2)
CA-I BLD-MCNC: 4.7 MG/DL (ref 4.4–5.2)
CALCIUM SERPL-MCNC: 8.5 MG/DL (ref 8.8–10.2)
CHLORIDE BLD-SCNC: 104 MMOL/L (ref 94–109)
CHLORIDE BLD-SCNC: 106 MMOL/L (ref 94–109)
CHLORIDE SERPL-SCNC: 108 MMOL/L (ref 98–107)
CO2 BLD-SCNC: 29 MMOL/L (ref 20–32)
CO2 BLD-SCNC: 30 MMOL/L (ref 20–32)
CODING SYSTEM: NORMAL
CREAT BLD-MCNC: 0.9 MG/DL (ref 0.5–1)
CREAT BLD-MCNC: 0.9 MG/DL (ref 0.5–1)
CREAT SERPL-MCNC: 0.88 MG/DL (ref 0.51–0.95)
DEPRECATED HCO3 PLAS-SCNC: 29 MMOL/L (ref 22–29)
EOSINOPHIL # BLD AUTO: 0.3 10E3/UL (ref 0–0.7)
EOSINOPHIL NFR BLD AUTO: 3 %
ERYTHROCYTE [DISTWIDTH] IN BLOOD BY AUTOMATED COUNT: 13.1 % (ref 10–15)
GFR SERPL CREATININE-BSD FRML MDRD: 65 ML/MIN/1.73M2
GLUCOSE BLD-MCNC: 119 MG/DL (ref 70–99)
GLUCOSE BLD-MCNC: 136 MG/DL (ref 70–99)
GLUCOSE SERPL-MCNC: 141 MG/DL (ref 70–99)
HCO3 BLDV-SCNC: 32 MMOL/L (ref 21–28)
HCT VFR BLD AUTO: 38.7 % (ref 35–47)
HCT VFR BLD CALC: 30 % (ref 35–47)
HCT VFR BLD CALC: 37 % (ref 35–47)
HGB BLD-MCNC: 10.2 G/DL (ref 11.7–15.7)
HGB BLD-MCNC: 11.9 G/DL (ref 11.7–15.7)
HGB BLD-MCNC: 12.6 G/DL (ref 11.7–15.7)
IMM GRANULOCYTES # BLD: 0 10E3/UL
IMM GRANULOCYTES NFR BLD: 0 %
INR PPP: 1.07 (ref 0.85–1.15)
ISSUE DATE AND TIME: NORMAL
LACTATE BLD-SCNC: 1.5 MMOL/L
LYMPHOCYTES # BLD AUTO: 2.2 10E3/UL (ref 0.8–5.3)
LYMPHOCYTES NFR BLD AUTO: 28 %
MCH RBC QN AUTO: 31.6 PG (ref 26.5–33)
MCHC RBC AUTO-ENTMCNC: 30.7 G/DL (ref 31.5–36.5)
MCV RBC AUTO: 103 FL (ref 78–100)
MONOCYTES # BLD AUTO: 0.8 10E3/UL (ref 0–1.3)
MONOCYTES NFR BLD AUTO: 10 %
NEUTROPHILS # BLD AUTO: 4.7 10E3/UL (ref 1.6–8.3)
NEUTROPHILS NFR BLD AUTO: 58 %
NRBC # BLD AUTO: 0 10E3/UL
NRBC BLD AUTO-RTO: 0 /100
PCO2 BLDV: 65 MM HG (ref 40–50)
PH BLDV: 7.3 [PH] (ref 7.32–7.43)
PLATELET # BLD AUTO: 242 10E3/UL (ref 150–450)
PO2 BLDV: 16 MM HG (ref 25–47)
POTASSIUM BLD-SCNC: 3.4 MMOL/L (ref 3.4–5.3)
POTASSIUM BLD-SCNC: 4.1 MMOL/L (ref 3.4–5.3)
POTASSIUM SERPL-SCNC: 4.2 MMOL/L (ref 3.4–5.3)
PROT SERPL-MCNC: 6.6 G/DL (ref 6.4–8.3)
RBC # BLD AUTO: 3.77 10E6/UL (ref 3.8–5.2)
SAO2 % BLDV: 17 % (ref 94–100)
SARS-COV-2 RNA RESP QL NAA+PROBE: NEGATIVE
SODIUM BLD-SCNC: 147 MMOL/L (ref 133–144)
SODIUM BLD-SCNC: 148 MMOL/L (ref 133–144)
SODIUM SERPL-SCNC: 147 MMOL/L (ref 136–145)
SPECIMEN EXPIRATION DATE: NORMAL
UNIT ABO/RH: NORMAL
UNIT NUMBER: NORMAL
UNIT STATUS: NORMAL
UNIT TYPE ISBT: 9500
WBC # BLD AUTO: 8 10E3/UL (ref 4–11)

## 2023-01-01 PROCEDURE — 80047 BASIC METABLC PNL IONIZED CA: CPT

## 2023-01-01 PROCEDURE — U0003 INFECTIOUS AGENT DETECTION BY NUCLEIC ACID (DNA OR RNA); SEVERE ACUTE RESPIRATORY SYNDROME CORONAVIRUS 2 (SARS-COV-2) (CORONAVIRUS DISEASE [COVID-19]), AMPLIFIED PROBE TECHNIQUE, MAKING USE OF HIGH THROUGHPUT TECHNOLOGIES AS DESCRIBED BY CMS-2020-01-R: HCPCS | Mod: ORL

## 2023-01-01 PROCEDURE — 87635 SARS-COV-2 COVID-19 AMP PRB: CPT | Mod: ORL

## 2023-01-01 PROCEDURE — U0005 INFEC AGEN DETEC AMPLI PROBE: HCPCS | Mod: ORL

## 2023-01-01 PROCEDURE — 99291 CRITICAL CARE FIRST HOUR: CPT | Mod: 25

## 2023-01-01 PROCEDURE — 80053 COMPREHEN METABOLIC PANEL: CPT | Performed by: EMERGENCY MEDICINE

## 2023-01-01 PROCEDURE — G0378 HOSPITAL OBSERVATION PER HR: HCPCS

## 2023-01-01 PROCEDURE — 36430 TRANSFUSION BLD/BLD COMPNT: CPT

## 2023-01-01 PROCEDURE — 86850 RBC ANTIBODY SCREEN: CPT | Performed by: EMERGENCY MEDICINE

## 2023-01-01 PROCEDURE — 99232 SBSQ HOSP IP/OBS MODERATE 35: CPT | Mod: GW | Performed by: PHYSICIAN ASSISTANT

## 2023-01-01 PROCEDURE — 99222 1ST HOSP IP/OBS MODERATE 55: CPT | Mod: GW | Performed by: INTERNAL MEDICINE

## 2023-01-01 PROCEDURE — 85014 HEMATOCRIT: CPT | Performed by: EMERGENCY MEDICINE

## 2023-01-01 PROCEDURE — 99309 SBSQ NF CARE MODERATE MDM 30: CPT | Mod: GW

## 2023-01-01 PROCEDURE — 99308 SBSQ NF CARE LOW MDM 20: CPT | Mod: GW | Performed by: INTERNAL MEDICINE

## 2023-01-01 PROCEDURE — 36415 COLL VENOUS BLD VENIPUNCTURE: CPT | Performed by: EMERGENCY MEDICINE

## 2023-01-01 PROCEDURE — 96374 THER/PROPH/DIAG INJ IV PUSH: CPT

## 2023-01-01 PROCEDURE — 99309 SBSQ NF CARE MODERATE MDM 30: CPT | Mod: GW | Performed by: INTERNAL MEDICINE

## 2023-01-01 PROCEDURE — 250N000009 HC RX 250: Performed by: PHYSICIAN ASSISTANT

## 2023-01-01 PROCEDURE — 82803 BLOOD GASES ANY COMBINATION: CPT

## 2023-01-01 PROCEDURE — 86901 BLOOD TYPING SEROLOGIC RH(D): CPT | Performed by: EMERGENCY MEDICINE

## 2023-01-01 PROCEDURE — 250N000009 HC RX 250

## 2023-01-01 PROCEDURE — P9016 RBC LEUKOCYTES REDUCED: HCPCS

## 2023-01-01 PROCEDURE — 250N000011 HC RX IP 250 OP 636: Mod: JZ | Performed by: INTERNAL MEDICINE

## 2023-01-01 PROCEDURE — 99309 SBSQ NF CARE MODERATE MDM 30: CPT | Mod: GV

## 2023-01-01 PROCEDURE — U0003 INFECTIOUS AGENT DETECTION BY NUCLEIC ACID (DNA OR RNA); SEVERE ACUTE RESPIRATORY SYNDROME CORONAVIRUS 2 (SARS-COV-2) (CORONAVIRUS DISEASE [COVID-19]), AMPLIFIED PROBE TECHNIQUE, MAKING USE OF HIGH THROUGHPUT TECHNOLOGIES AS DESCRIBED BY CMS-2020-01-R: HCPCS | Mod: ORL | Performed by: INTERNAL MEDICINE

## 2023-01-01 PROCEDURE — 85610 PROTHROMBIN TIME: CPT | Performed by: EMERGENCY MEDICINE

## 2023-01-01 RX ORDER — NALOXONE HYDROCHLORIDE 0.4 MG/ML
0.2 INJECTION, SOLUTION INTRAMUSCULAR; INTRAVENOUS; SUBCUTANEOUS
Status: DISCONTINUED | OUTPATIENT
Start: 2023-01-01 | End: 2023-06-22 | Stop reason: HOSPADM

## 2023-01-01 RX ORDER — DULOXETIN HYDROCHLORIDE 20 MG/1
20 CAPSULE, DELAYED RELEASE ORAL DAILY
COMMUNITY

## 2023-01-01 RX ORDER — MINERAL OIL/HYDROPHIL PETROLAT
OINTMENT (GRAM) TOPICAL
Status: DISCONTINUED | OUTPATIENT
Start: 2023-01-01 | End: 2023-06-22 | Stop reason: HOSPADM

## 2023-01-01 RX ORDER — SALIVA STIMULANT COMB. NO.3
2 SPRAY, NON-AEROSOL (ML) MUCOUS MEMBRANE
Status: DISCONTINUED | OUTPATIENT
Start: 2023-01-01 | End: 2023-06-22 | Stop reason: HOSPADM

## 2023-01-01 RX ORDER — HYDROMORPHONE HYDROCHLORIDE 2 MG/1
2 TABLET ORAL EVERY 4 HOURS PRN
Status: DISCONTINUED | OUTPATIENT
Start: 2023-01-01 | End: 2023-01-01

## 2023-01-01 RX ORDER — LORAZEPAM 2 MG/ML
1 INJECTION INTRAMUSCULAR
Status: DISCONTINUED | OUTPATIENT
Start: 2023-01-01 | End: 2023-06-22 | Stop reason: HOSPADM

## 2023-01-01 RX ORDER — MORPHINE SULFATE 10 MG/5ML
10 SOLUTION ORAL
Status: DISCONTINUED | OUTPATIENT
Start: 2023-01-01 | End: 2023-06-22 | Stop reason: HOSPADM

## 2023-01-01 RX ORDER — MORPHINE SULFATE 30 MG/1
10 TABLET ORAL 3 TIMES DAILY
COMMUNITY

## 2023-01-01 RX ORDER — LORAZEPAM 0.5 MG/1
0.5 TABLET ORAL EVERY 4 HOURS PRN
COMMUNITY

## 2023-01-01 RX ORDER — HYDROMORPHONE HCL IN WATER/PF 6 MG/30 ML
0.4 PATIENT CONTROLLED ANALGESIA SYRINGE INTRAVENOUS
Status: DISCONTINUED | OUTPATIENT
Start: 2023-01-01 | End: 2023-01-01

## 2023-01-01 RX ORDER — LORAZEPAM 2 MG/ML
0.5 INJECTION INTRAMUSCULAR EVERY 4 HOURS PRN
Status: DISCONTINUED | OUTPATIENT
Start: 2023-01-01 | End: 2023-06-22 | Stop reason: HOSPADM

## 2023-01-01 RX ORDER — MORPHINE SULFATE 20 MG/ML
10 SOLUTION ORAL
Status: DISCONTINUED | OUTPATIENT
Start: 2023-01-01 | End: 2023-06-22 | Stop reason: HOSPADM

## 2023-01-01 RX ORDER — ACETAMINOPHEN 650 MG/1
650 SUPPOSITORY RECTAL EVERY 6 HOURS PRN
COMMUNITY

## 2023-01-01 RX ORDER — NALOXONE HYDROCHLORIDE 0.4 MG/ML
0.4 INJECTION, SOLUTION INTRAMUSCULAR; INTRAVENOUS; SUBCUTANEOUS
Status: DISCONTINUED | OUTPATIENT
Start: 2023-01-01 | End: 2023-06-22 | Stop reason: HOSPADM

## 2023-01-01 RX ORDER — ATROPINE SULFATE 10 MG/ML
2 SOLUTION/ DROPS OPHTHALMIC EVERY 4 HOURS PRN
Status: DISCONTINUED | OUTPATIENT
Start: 2023-01-01 | End: 2023-06-22 | Stop reason: HOSPADM

## 2023-01-01 RX ORDER — ONDANSETRON 4 MG/1
4 TABLET, ORALLY DISINTEGRATING ORAL EVERY 6 HOURS PRN
Status: DISCONTINUED | OUTPATIENT
Start: 2023-01-01 | End: 2023-06-22 | Stop reason: HOSPADM

## 2023-01-01 RX ORDER — TRANEXAMIC ACID 10 MG/ML
INJECTION, SOLUTION INTRAVENOUS
Status: COMPLETED
Start: 2023-01-01 | End: 2023-01-01

## 2023-01-01 RX ORDER — PROCHLORPERAZINE 25 MG
12.5 SUPPOSITORY, RECTAL RECTAL EVERY 12 HOURS PRN
Status: DISCONTINUED | OUTPATIENT
Start: 2023-01-01 | End: 2023-06-22 | Stop reason: HOSPADM

## 2023-01-01 RX ORDER — MORPHINE SULFATE 10 MG/5ML
5 SOLUTION ORAL
Status: DISCONTINUED | OUTPATIENT
Start: 2023-01-01 | End: 2023-06-22 | Stop reason: HOSPADM

## 2023-01-01 RX ORDER — HYDROMORPHONE HCL IN WATER/PF 6 MG/30 ML
0.2 PATIENT CONTROLLED ANALGESIA SYRINGE INTRAVENOUS
Status: DISCONTINUED | OUTPATIENT
Start: 2023-01-01 | End: 2023-01-01

## 2023-01-01 RX ORDER — LORAZEPAM 1 MG/1
1 TABLET ORAL
Status: DISCONTINUED | OUTPATIENT
Start: 2023-01-01 | End: 2023-06-22 | Stop reason: HOSPADM

## 2023-01-01 RX ORDER — CARBOXYMETHYLCELLULOSE SODIUM 5 MG/ML
1-2 SOLUTION/ DROPS OPHTHALMIC
Status: DISCONTINUED | OUTPATIENT
Start: 2023-01-01 | End: 2023-06-22 | Stop reason: HOSPADM

## 2023-01-01 RX ORDER — AMOXICILLIN 250 MG
1 CAPSULE ORAL 2 TIMES DAILY
COMMUNITY

## 2023-01-01 RX ORDER — MORPHINE SULFATE 20 MG/ML
5 SOLUTION ORAL
Status: DISCONTINUED | OUTPATIENT
Start: 2023-01-01 | End: 2023-06-22 | Stop reason: HOSPADM

## 2023-01-01 RX ORDER — LORAZEPAM 0.5 MG/1
0.5 TABLET ORAL 4 TIMES DAILY
COMMUNITY

## 2023-01-01 RX ORDER — ACETAMINOPHEN 325 MG/1
650 TABLET ORAL EVERY 6 HOURS PRN
Status: DISCONTINUED | OUTPATIENT
Start: 2023-01-01 | End: 2023-06-22 | Stop reason: HOSPADM

## 2023-01-01 RX ORDER — ONDANSETRON 2 MG/ML
4 INJECTION INTRAMUSCULAR; INTRAVENOUS EVERY 6 HOURS PRN
Status: DISCONTINUED | OUTPATIENT
Start: 2023-01-01 | End: 2023-06-22 | Stop reason: HOSPADM

## 2023-01-01 RX ORDER — ACETAMINOPHEN 650 MG/1
650 SUPPOSITORY RECTAL EVERY 6 HOURS PRN
Status: DISCONTINUED | OUTPATIENT
Start: 2023-01-01 | End: 2023-06-22 | Stop reason: HOSPADM

## 2023-01-01 RX ORDER — HALOPERIDOL 5 MG/ML
1 INJECTION INTRAMUSCULAR EVERY 6 HOURS PRN
Status: DISCONTINUED | OUTPATIENT
Start: 2023-01-01 | End: 2023-06-22 | Stop reason: HOSPADM

## 2023-01-01 RX ORDER — PROCHLORPERAZINE MALEATE 5 MG
5 TABLET ORAL EVERY 6 HOURS PRN
Status: DISCONTINUED | OUTPATIENT
Start: 2023-01-01 | End: 2023-06-22 | Stop reason: HOSPADM

## 2023-01-01 RX ADMIN — HYDROMORPHONE HYDROCHLORIDE 0.2 MG: 0.2 INJECTION, SOLUTION INTRAMUSCULAR; INTRAVENOUS; SUBCUTANEOUS at 18:43

## 2023-01-01 RX ADMIN — ATROPINE SULFATE 2 DROP: 10 SOLUTION OPHTHALMIC at 17:57

## 2023-01-01 RX ADMIN — TRANEXAMIC ACID 1000 MG: 10 INJECTION, SOLUTION INTRAVENOUS at 19:57

## 2023-01-01 ASSESSMENT — ACTIVITIES OF DAILY LIVING (ADL)
ADLS_ACUITY_SCORE: 50
ADLS_ACUITY_SCORE: 50
ADLS_ACUITY_SCORE: 35
ADLS_ACUITY_SCORE: 50
ADLS_ACUITY_SCORE: 35
ADLS_ACUITY_SCORE: 39
ADLS_ACUITY_SCORE: 43
ADLS_ACUITY_SCORE: 50
ADLS_ACUITY_SCORE: 35
ADLS_ACUITY_SCORE: 50
ADLS_ACUITY_SCORE: 35
ADLS_ACUITY_SCORE: 50
ADLS_ACUITY_SCORE: 50
ADLS_ACUITY_SCORE: 35
ADLS_ACUITY_SCORE: 39
ADLS_ACUITY_SCORE: 43
ADLS_ACUITY_SCORE: 50
ADLS_ACUITY_SCORE: 43
ADLS_ACUITY_SCORE: 50

## 2023-02-04 PROBLEM — G30.9 MIXED DEMENTIA (H): Status: ACTIVE | Noted: 2023-01-01

## 2023-02-04 PROBLEM — F01.50 MIXED DEMENTIA (H): Status: ACTIVE | Noted: 2023-01-01

## 2023-02-04 PROBLEM — F02.80 MIXED DEMENTIA (H): Status: ACTIVE | Noted: 2023-01-01

## 2023-02-04 NOTE — PROGRESS NOTES
"Errol Londono is a 82 year old [place]mong female seen January 20, 2023 at [facility] where she has resided for 7 months (admit 6/2022) seen for regulatory visit and to follow up  Pt is seen on the unit up to [place].  She does not reply to questions but seems to be understanding a bit.  Nods yes to \"are you comfortable?\" and no to \"any pain?\"   No new concerns reported by nursing or Hospice staff.       By chart review, pt has HTN, vascular disease and depression.   Pt was followed in [facility] clinic, not consistently taking prescribed medications.     In June 2021 pt was seen in the [facility] ED for acute neurologic changes with confusion and aphasia   Symptoms resolved in ED and thought to represent a TIA.     In April 2022 pt presented to [facility] hospitalization with weakness, poor oral intake and electrolyte abnormalities.  Again not verbal  In June 2022 pt had an 8 day hospitalization for metabolic encephalopathy and failure to thrive.  Found to be hypoxic, requiring 6 L/m of O2 supplementation, which was thought to be secondary to CAP vs aspiration pneumonitis vs cryptogenic organizing pneumonia.  She was treated with Zosyn and IV diuretics   BNP 18, 991 and troponin mildly elevated.    ECHO showed diastolic dysfunction and pulmonary HTN.   After discussion, family decided on comfort cares and she discharged to LTC on Hospice.   Pt found to be COVID19 in December 2022, was asx and did not require treatment        PMH:   Osteoporosis  Urinary incontinence  Prediabetes  RLS  HTN  GERD  OA  Depression  H/o gallstone pancreatitis, 2011   Pulmonary HTN   HFpEF, 2022   Deementia    Past Surgical History:   Procedure Laterality Date     IR BILIARY TUBE CHANGE  5/26/2011     IR TRANSCATHETER BIOPSY  5/26/2011     SH:  Previously lived with her daughter Tawny and her family, house in [place]  Pt has 10 children     Review Of Systems  Incontinent and dependent for cares   Weight was 128 lbs in 2013>>> was 96 lbs in " June 2022  Wt Readings from Last 5 Encounters:   01/20/23 42.2 kg (93 lb 1.6 oz)   01/19/23 42.2 kg (93 lb 1.6 oz)   12/14/22 41.7 kg (92 lb)   11/11/22 41.8 kg (92 lb 3.2 oz)   09/22/22 41.5 kg (91 lb 9.6 oz)     EXAM: frail, NAD  /85   Pulse 77   Temp 97.8  F (36.6  C)   Resp 17   Ht 1.524 m (5')   Wt 42.2 kg (93 lb 1.6 oz)   SpO2 95%   BMI 18.18 kg/m     Neck supple without adenopathy  Lungs with decreased BS, no rales or wheeze   Heart RRR s1s2    Abd soft, NT, no distention or guarding, +BS  Ext without edema  Neuro: non verbal, WC bound  Psych: affect okay, appears comfortable and minimally interactive          Labs reviewed, none recent given Hospice status      CT OF THE HEAD WITHOUT CONTRAST 6/16/2022                                                         IMPRESSION: Diffuse cerebral volume loss and cerebral white matter  changes consistent with chronic small vessel ischemic disease. No  evidence for acute intracranial pathology. Possible small chronic  ischemic infarct in the right cerebellar hemisphere again noted.    ECHO 6/14/2022    Probably normal left ventricular systolic function. Contrast would enhance wall motion analysis.  The visual ejection fraction is 65-70%.  Flattened septum is consistent with RV pressure overload.  The right ventricle is mildly dilated.  The right ventricular systolic function is moderately reduced.   There is mild to moderate (1-2+) tricuspid regurgitation.  Right ventricular systolic pressure is elevated, consistent with moderate pulmonary hypertension.  Mild valvular aortic stenosis.    The ascending aorta is Mildly dilated.      IMP/PLAN:   (I27.20) Moderate pulmonary hypertension (H)   Comment:   Stable volume status on exam today and weight is stable     Plan: not on failure regimen secondary to goals of care.   MS available if she is uncomfortably dyspneic.       (F33.9) Recurrent major depressive disorder, remission status unspecified (H)  Comment: >10  years by hx  Plan: duloxetine 30 mg/day       (R62.7) Failure to thrive in adult  (M62.81) Generalized muscle weakness  Comment:  eating is variable.    Plan: food preferences, supportive care with comfort focus    (I67.9) Cerebral vascular disease  (G30.9,  F01.50,  F02.80) Mixed dementia (H)  Comment: no formal testing on EHR, but clear dementia by chart review and history, with loss of language, mobility and functional status  Plan: LTC support for med admin, meals, activity and ADLs.        (Z51.5) Hospice care patient  Comment: PRNs available for treating any symptoms   Plan: Followed by [facility]     Vianey German MD

## 2023-03-05 NOTE — TELEPHONE ENCOUNTER
[place] GERIATRIC SERVICES TRIAGE ENCOUNTER    Chief Complaint   Patient presents with     Fall       See Spring is a 82 year old  (1940), Nurse called today to report: patient had a fall this am, now with bruising and stiffness of the right elbow and right hip. Staff is requesting imaging    ASSESSMENT/PLAN    OK for xray of the right hip and right elbow    ON hospice and taking morphine.    Call hospice to notify of the fall with imaging    Electronically signed by:   Jayde Howe NP

## 2023-03-30 NOTE — PROGRESS NOTES
[facility] GERIATRICS  Chief Complaint   Patient presents with     prison [facility] Medical Record Number:  [id number]  Place of Service where encounter took place:  [facility]  () [[id number]]    HPI:    See Spring  is 82 year old (1940), who is being seen today for a federally mandated E/M visit.     By chart review, hospitalized at [facility] 6/14-6/22/22 for generalized muscle weakness, work-up remarkable for hypokalemia, hypertension, BNP 18 daily, mildly elevated sensitive troponin of 205, echo showed diastolic dysfunction, pulmonary hypertension with acute onset congestive heart failure.  Also with acute metabolic encephalopathy, and with prolonged lethargy and respiratory sx, CT showed severe bilateral opacities consistent with pneumonia, started on Zosyn, prednisone.  Given overall picture, functional decline, failure to thrive, family opted for hospice evaluation and comfort measures, discharged to long-term care facility for skilled nursing care and admitted to [facility]. She initially improved in LTC feeding herself and up for meals, more recently she is sleeping more and has had multiple falls and some agitation.     Today's concerns are: Seen today for routine follow-up on  multiple medical conditions up on the unit in LTC. She awakens briefly to physical stimuli but then falls back to sleep. She is resting comfortably in a broda chair. Nursing note general decline this week, sleeping more.     ALLERGIES:Gabapentin  PAST MEDICAL HISTORY: No past medical history on file.  PAST SURGICAL HISTORY:   has a past surgical history that includes IR Biliary Tube Change (5/26/2011) and IR Transcatheter Biopsy (5/26/2011).  FAMILY HISTORY: family history includes No Known Problems in her father and mother.  SOCIAL HISTORY:      MEDICATIONS:       Review of your medicines          Accurate as of March 30, 2023 11:59 PM. If you have any questions, ask your nurse or  doctor.            CONTINUE these medicines which may have CHANGED, or have new prescriptions. If we are uncertain of the size of tablets/capsules you have at home, strength may be listed as something that might have changed.      Dose / Directions   LORazepam 1 MG tablet  Commonly known as: ATIVAN  This may have changed:     how much to take    when to take this  Used for: Acute respiratory failure with hypoxia (H), Hospice care patient      Dose: 1 mg  Place 1 tablet (1 mg) under the tongue every 3 hours as needed for anxiety  Quantity: 16 tablet  Refills: 0        CONTINUE these medicines which have NOT CHANGED      Dose / Directions   acetaminophen 650 MG suppository  Commonly known as: TYLENOL  Used for: Acute respiratory failure with hypoxia (H), Hospice care patient      Dose: 650 mg  Place 1 suppository (650 mg) rectally every 4 hours as needed for fever  Quantity: 12 suppository  Refills: 0     bisacodyl 10 MG suppository  Commonly known as: DULCOLAX      Dose: 10 mg  Place 10 mg rectally daily as needed for constipation  Refills: 0     DULoxetine 30 MG capsule  Commonly known as: CYMBALTA  Indication: For pain  Used for: Acute respiratory failure with hypoxia (H), Hospice care patient      Dose: 30 mg  Take 1 capsule (30 mg) by mouth daily  Quantity: 3 capsule  Refills: 0     hyoscyamine 0.125 MG Tbdp      Dose: 0.125 mg  Take 0.125 mg by mouth every 4 hours as needed for cramping  Refills: 0     * morphine 2.5 MG solu-tab      Dose: 2.5 mg  Take 2.5 mg by mouth every hour as needed for shortness of breath / dyspnea or moderate to severe pain  Refills: 0     * morphine 2.5 MG solu-tab      Dose: 2.5 mg  Take 2.5 mg by mouth At Bedtime  Refills: 0     senna-docusate 8.6-50 MG tablet  Commonly known as: SENOKOT-S/PERICOLACE      Dose: 1 tablet  Take 1 tablet by mouth daily And daily PRN  Refills: 0         * This list has 2 medication(s) that are the same as other medications prescribed for you. Read the  directions carefully, and ask your doctor or other care provider to review them with you.                Case Management:  I have reviewed the care plan and MDS and do agree with the plan. Patient's desire to return to the community is not assessible due to cognitive impairment. Information reviewed:  Medications, vital signs, orders, and nursing notes.    ROS:  Unobtainable secondary to cognitive impairment.     Vitals:  /67   Pulse 74   Temp 98.2  F (36.8  C)   Resp 18   Ht 1.524 m (5')   Wt 36.7 kg (81 lb)   SpO2 95%   BMI 15.82 kg/m    Body mass index is 15.82 kg/m .  Exam:  GENERAL APPEARANCE:  somnolent, in no distress, thin and frail appearing  RESP:  respiratory effort and palpation of chest normal, lungs clear to auscultation , no respiratory distress  CV:  Palpation and auscultation of heart done , regular rate and rhythm, no murmur, rub, or gallop, no edema  ABDOMEN:  normal bowel sounds, soft, nontender, no hepatosplenomegaly or other masses  M/S:   Gait and station abnormal transfers with assist, wheelchair for mobility  SKIN:  Inspection of skin and subcutaneous tissue baseline, Palpation of skin and subcutaneous tissue baseline  NEURO:   Awakens briefly to touch  PSYCH:  insight and judgement impaired, memory impaired     Lab/Diagnostic data:   Patient is on hospice/palliative care and labs are not recommended    ASSESSMENT/PLAN  (I67.9) Cerebral vascular disease  (primary encounter diagnosis)  (Z51.5) Hospice care patient  (G30.9,  F01.50,  F02.80) Mixed dementia (H)  (R63.4) Weight loss  Comment: low functional status, initially stabilized to LTC and now with ongoing decline, sleeping more. [facility] hospice is following, appreciate their involvement. Note significant weight loss  -management reviewed with nursing facility staff today  Wt Readings from Last 4 Encounters:   03/30/23 36.7 kg (81 lb)   01/20/23 42.2 kg (93 lb 1.6 oz)   01/19/23 42.2 kg (93 lb 1.6 oz)   12/14/22 41.7 kg (92  lb)   Plan: SNF for assist with ADLs, medication management, meals, assist with feeding PRN, activities. Treat to comfort per hospice. Rd following, offer food preferences.     (I50.32) Chronic heart failure with preserved ejection fraction (H)  Comment: Chronic, with acute exacerbation during her June hospitalization. Not on HF regimen per goals of care, appears euvolemic and no reported symptoms.   Plan: monitor weights, treat to comfort. No labs per GOC.     (F33.9) Recurrent major depressive disorder, remission status unspecified (H)  Comment: chronic, tolerating GDR for duloxetine 30->20 mg daily  Plan: continue duloxetine 20 mg daily, monitor mood. ACP PRN        Electronically signed by:  PRISCILA Cronin CNP

## 2023-04-24 NOTE — PROGRESS NOTES
[facility] GERIATRICS    Chief Complaint   Patient presents with     Nursing Home Acute     HPI:  See Spring is a 83 year old  (1940), who is being seen today for an episodic care visit at: [facility]  () [[id number]]. Today's concern is: Seen today at nursing request for follow-up on hypertension. By nursing report, patient had fall x2 over the weekend while agitated and climbed from her wheelchair. She had right head trauma with one fall which initiated neuro protocol with hourly assessment and VS. Nursing noted she is intermittently hypertensive with BP up to 176/119. Seen today up on the unit in LTC. She is resting comfortably in her broda chair with eyes closed. She awakens briefly to stimulation but does not cooperate with exam or respond verbally. She appears comfortable.    Allergies, and PMH/PSH reviewed in [employer] today.  REVIEW OF SYSTEMS:  Unobtainable secondary to cognitive impairment.     Objective:   /84   Pulse 52   Temp 98  F (36.7  C)   Resp 16   Ht 1.524 m (5')   Wt 34.1 kg (75 lb 3.2 oz)   SpO2 96%   BMI 14.69 kg/m    GENERAL APPEARANCE:  in no distress, thin, frail appearing  RESP:  respiratory effort and palpation of chest normal, lungs clear to auscultation , no respiratory distress  CV:  Palpation and auscultation of heart done , regular rate and rhythm, no murmur, rub, or gallop, no edema  ABDOMEN:  normal bowel sounds, soft, nontender, no hepatosplenomegaly or other masses  M/S:   Gait and station abnormal transfers with assist, wheelchair for mobility  SKIN:  Inspection of skin and subcutaneous tissue baseline, Palpation of skin and subcutaneous tissue baseline, mild perioribital brusiing bilaterally, moderate edema to left  NEURO:   Cranial nerves 2-12 are normal tested and grossly at patient's baseline, garcia  PSYCH:  memory impaired , minimally cooperative    Labs done in SNF are in [facility]. Please refer to them using [email or web address]/Care Everywhere. and  Recent labs in EPIC reviewed by me today.     Assessment/Plan:   (W19.XXXD) Fall, subsequent encounter  (primary encounter diagnosis)  (G30.9,  F01.50,  F02.80) Mixed dementia (H)  (I50.32) Chronic heart failure with preserved ejection fraction (H)  (I10) Essential hypertension  Comment: Recurrent fall secondary to agitation; patient is terminally ill with end stage dementia on hospice care. Reviewed BP log from neuro exam, readings vary from -170, but typically 130s. She does not appear hypervolemic. Liberalize BP goal <180 per goals of care and due to risk for hypotension, falls, and amlodipine previously discontinued for the same. Nursing do not otherwise check SBP routinely per comfort measures so will likely not benefit from PRN. If readings consistently >140s could consider resuming low dose amlodipine. Discussed management with hospice RN, he feels elevated readings likely correlate with pain, encouraging use of PRNs for comfort and he/family are in agreement with plan.    Plan: Continue neuro checks per policy, falls interventions, lorazeapm and morphine scheduled and as needed for comfort, treat to comfort per [facility] hospice.         Electronically signed by: PRISCILA Cronin CNP

## 2023-06-01 NOTE — PROGRESS NOTES
Errol Londono is a 83 year old female seen May 19, 2023 at [facility] where she has resided for 11 months (admit 6/2022) seen for regulatory visit and to follow up weight loss   Pt is seen on the unit up to Broda resting quietly.   Does not respond verbally or much at all.      Nursing staff has reported occasional restlessness and falls when attempting to transfer herself out of her WC.  Some related pain with HTN transiently.     Family comes in to feed pt, but she is not always able to swallow, weight continues to drop.      By chart review, pt has HTN, vascular disease and depression.   Pt was followed in [facility] clinic, not consistently taking prescribed medications.     In June 2021 pt was seen in the [facility] ED for acute neurologic changes with confusion and aphasia   Symptoms resolved in ED and thought to represent a TIA.     In April 2022 pt presented to [facility] hospitalization with weakness, poor oral intake and electrolyte abnormalities.  Again not verbal  In June 2022 pt had an 8 day hospitalization for metabolic encephalopathy and failure to thrive.  Found to be hypoxic, requiring 6 L/m of O2 supplementation, which was thought to be secondary to CAP vs aspiration pneumonitis vs cryptogenic organizing pneumonia.  She was treated with Zosyn and IV diuretics   BNP 18, 991 and troponin mildly elevated.    ECHO showed diastolic dysfunction and pulmonary HTN.   After discussion, family decided on comfort cares and she discharged to LTC on Hospice.   Pt found to be COVID19 in December 2022, was asx and did not require treatment        PMH:   Osteoporosis  Urinary incontinence  Prediabetes  RLS  HTN  GERD  OA  Depression  H/o gallstone pancreatitis, 2011   Pulmonary HTN   HFpEF, 2022   Deementia    Past Surgical History:   Procedure Laterality Date     IR BILIARY TUBE CHANGE  5/26/2011     IR TRANSCATHETER BIOPSY  5/26/2011     SH:  Previously lived with her daughter Tawny and her family, house in  [facility] has 10 children     Review Of Systems  Incontinent and dependent for cares   Weight was 128 lbs in 2013>>> was 96 lbs in June 2022  Wt Readings from Last 5 Encounters:   05/19/23 36.2 kg (79 lb 12.8 oz)   04/24/23 34.1 kg (75 lb 3.2 oz)   03/30/23 36.7 kg (81 lb)   01/20/23 42.2 kg (93 lb 1.6 oz)   01/19/23 42.2 kg (93 lb 1.6 oz)     EXAM: frail, NAD  /68   Pulse 80   Temp 97.6  F (36.4  C)   Resp 18   Ht 1.524 m (5')   Wt 36.2 kg (79 lb 12.8 oz)   SpO2 97%   BMI 15.58 kg/m     Neck supple without adenopathy  Lungs with decreased BS, no rales or wheeze   Heart RRR s1s2    Abd soft, NT, no distention or guarding, +BS  Ext without edema  Neuro: minimally responsive, WC bound with assist for transfers  Psych: appears comfortable         Labs reviewed, none recent given Hospice status      CT OF THE HEAD WITHOUT CONTRAST 6/16/2022                                                         IMPRESSION: Diffuse cerebral volume loss and cerebral white matter  changes consistent with chronic small vessel ischemic disease. No  evidence for acute intracranial pathology. Possible small chronic  ischemic infarct in the right cerebellar hemisphere again noted.    ECHO 6/14/2022    Probably normal left ventricular systolic function. Contrast would enhance wall motion analysis.  The visual ejection fraction is 65-70%.  Flattened septum is consistent with RV pressure overload.  The right ventricle is mildly dilated.  The right ventricular systolic function is moderately reduced.   There is mild to moderate (1-2+) tricuspid regurgitation.  Right ventricular systolic pressure is elevated, consistent with moderate pulmonary hypertension.  Mild valvular aortic stenosis.    The ascending aorta is Mildly dilated.      IMP/PLAN:   (I50.32) Chronic heart failure with preserved ejection fraction (H)   (I27.20) Moderate pulmonary hypertension (H)   Comment: significant occurrence at last hospitalization, but has been  cardiovascularly stable since admission   Plan: not on failure regimen secondary to goals of care.   MS available if she is uncomfortably dyspneic.       (F33.9) Recurrent major depressive disorder, remission status unspecified (H)  Comment: >10 years by history  No changes after GDR of duloxetine  Plan: duloxetine 20 mg/day       (R62.7) Failure to thrive in adult  (M62.81) Generalized muscle weakness  Comment:  Weight down 13 lbs since January   Plan: food preferences, supportive care with comfort focus  Family comes in to feed patient frequently    (I67.9) Cerebral vascular disease  (G30.9,  F01.50,  F02.80) Mixed dementia (H)  Comment: no formal testing on EHR, but clear dementia by chart review and history, with loss of language, mobility and functional status  Plan: LTC support for med admin, meals, activity and ADLs.        (Z51.5) Hospice care patient  Comment: PRNs available for treating any symptoms   Plan: Followed by [facility]     Vianey German MD

## 2023-06-19 PROBLEM — K92.2 GASTROINTESTINAL HEMORRHAGE, UNSPECIFIED GASTROINTESTINAL HEMORRHAGE TYPE: Status: ACTIVE | Noted: 2023-01-01

## 2023-06-20 NOTE — ED NOTES
[facility]  ED Nurse Handoff Report    ED Chief complaint: Vaginal Bleeding  . ED Diagnosis:   Final diagnoses:   Gastrointestinal hemorrhage, unspecified gastrointestinal hemorrhage type       Allergies:   Allergies   Allergen Reactions     Gabapentin Unknown       Code Status: Comfort Care    Activity level - Baseline/Home:  in bed.  Activity Level - Current:   in bed.   Lift room needed: No.   Bariatric: No   Needed: Yes   Isolation: Yes.   Infection: Not Applicable  Other .     Respiratory status: Room air    Vital Signs (within 30 minutes):   Vitals:    06/19/23 2130 06/19/23 2135 06/19/23 2140 06/19/23 2145   BP: (!) 80/68 (!) 76/63 (!) 84/70    Pulse: 120 116 113    Resp:       Temp:       TempSrc:       SpO2:   95% (!) 76%       Cardiac Rhythm:  ,      Pain level:    Patient confused: Yes.   Patient Falls Risk: patient and family education.   Elimination Status: Has voided     Patient Report - Initial Complaint: patient presents to ED via EMS from nursing home d/t vaginal bleeeding and blood pressure of 82 systolic.   Focused Assessment: on arrival patient passing large vaginal blood clots      Abnormal Results:   Labs Ordered and Resulted from Time of ED Arrival to Time of ED Departure   ISTAT BASIC CHEM ICA HEMATOCRIT POCT - Abnormal       Result Value    Chloride POCT 104      Potassium POCT 4.1      Sodium POCT 148 (*)     UREA NITROGEN POCT 29      Calcium, Ionized Whole Blood POCT 4.7      Glucose Whole Blood POCT 136 (*)     Anion Gap POCT 20.0 (*)     Hemoglobin POCT 12.6      Hematocrit POCT 37      Creatinine POCT 0.9      TOTAL CO2 POCT 30     COMPREHENSIVE METABOLIC PANEL - Abnormal    Sodium 147 (*)     Potassium 4.2      Chloride 108 (*)     Carbon Dioxide (CO2) 29      Anion Gap 10      Urea Nitrogen 28.1 (*)     Creatinine 0.88      Calcium 8.5 (*)     Glucose 141 (*)     Alkaline Phosphatase 85      AST 27      ALT 24      Protein Total 6.6      Albumin 3.3  (*)     Bilirubin Total 0.4      GFR Estimate 65     CBC WITH PLATELETS AND DIFFERENTIAL - Abnormal    WBC Count 8.0      RBC Count 3.77 (*)     Hemoglobin 11.9      Hematocrit 38.7       (*)     MCH 31.6      MCHC 30.7 (*)     RDW 13.1      Platelet Count 242      % Neutrophils 58      % Lymphocytes 28      % Monocytes 10      % Eosinophils 3      % Basophils 1      % Immature Granulocytes 0      NRBCs per 100 WBC 0      Absolute Neutrophils 4.7      Absolute Lymphocytes 2.2      Absolute Monocytes 0.8      Absolute Eosinophils 0.3      Absolute Basophils 0.0      Absolute Immature Granulocytes 0.0      Absolute NRBCs 0.0     ISTAT GASES LACTATE VENOUS POCT - Abnormal    Lactic Acid POCT 1.5      Bicarbonate Venous POCT 32 (*)     O2 Sat, Venous POCT 17 (*)     pCO2 Venous POCT 65 (*)     pH Venous POCT 7.30 (*)     pO2 Venous POCT 16 (*)    ISTAT BASIC CHEM ICA HEMATOCRIT POCT - Abnormal    Chloride POCT 106      Potassium POCT 3.4      Sodium POCT 147 (*)     UREA NITROGEN POCT 30      Calcium, Ionized Whole Blood POCT 4.3 (*)     Glucose Whole Blood POCT 119 (*)     Anion Gap POCT 17.0 (*)     Hemoglobin POCT 10.2 (*)     Hematocrit POCT 30 (*)     Creatinine POCT 0.9      TOTAL CO2 POCT 29     INR - Normal    INR 1.07     TYPE AND SCREEN, ADULT    ABO/RH(D) O POS      Antibody Screen Negative      SPECIMEN EXPIRATION DATE 20230622235900     PREPARE RED BLOOD CELLS (UNIT)    ISSUE DATE AND TIME [id number]      Blood Component Type Red Blood Cells      Product Code B1380S69      Unit Status Issued      Unit Number [id number]      UNIT ABO/RH O-      CODING SYSTEM [id number]      UNIT TYPE ISBT 9500     PREPARE RED BLOOD CELLS (UNIT)    ISSUE DATE AND TIME [id number]      Blood Component Type Red Blood Cells      Product Code Y7786A01      Unit Status Issued      Unit Number [id number]      UNIT ABO/RH O-      CODING SYSTEM [id number]      UNIT TYPE ISBT 9500     ABO/RH TYPE AND SCREEN        No  orders to display       Treatments provided: Blood transfussion   Family Comments: family at bedside   OBS brochure/video discussed/provided to patient:  Yes  ED Medications:   Medications   ondansetron (ZOFRAN ODT) ODT tab 4 mg (has no administration in time range)     Or   ondansetron (ZOFRAN) injection 4 mg (has no administration in time range)   acetaminophen (TYLENOL) tablet 650 mg (has no administration in time range)     Or   acetaminophen (TYLENOL) Suppository 650 mg (has no administration in time range)   HYDROmorphone (DILAUDID) half-tab 1 mg (has no administration in time range)   HYDROmorphone (DILAUDID) tablet 2 mg (has no administration in time range)   HYDROmorphone (DILAUDID) injection 0.2 mg (has no administration in time range)   HYDROmorphone (DILAUDID) injection 0.4 mg (has no administration in time range)   prochlorperazine (COMPAZINE) injection 5 mg (has no administration in time range)     Or   prochlorperazine (COMPAZINE) tablet 5 mg (has no administration in time range)     Or   prochlorperazine (COMPAZINE) suppository 12.5 mg (has no administration in time range)   LORazepam (ATIVAN) injection 0.5 mg (has no administration in time range)   haloperidol lactate (HALDOL) injection 1 mg (has no administration in time range)   Tranexamic Acid (CYCKLOKAPRON) 10 mg/mL infusion (1,000 mg  $Given 6/19/23 1957)       Drips infusing:  No  For the majority of the shift this patient was Green.   Interventions performed were NA.    Sepsis treatment initiated: No    Cares/treatment/interventions/medications to be completed following ED care:      ED Nurse Name: Michelle Bain RN  9:57 PM  RECEIVING UNIT ED HANDOFF REVIEW    Above ED Nurse Handoff Report was reviewed: Yes  Reviewed by: Darcie Avila RN on June 19, 2023 at 10:10 PM

## 2023-06-20 NOTE — PLAN OF CARE
Pt arrived on the floor and is unresponsive. Pt on comfort cares. Family at the bedside. Family will visit 5 at a time. Family requested to let other family members in when pt is close to passing. Communicated to the Charge nurse.

## 2023-06-20 NOTE — PROGRESS NOTES
Care Management Note    Length of Stay (days): 0    Expected Discharge Date: 06/20/2023     Concerns to be Addressed: discharge planning      Patient plan of care discussed at interdisciplinary rounds: Yes    Anticipated Discharge Disposition:  Remain in hospital vs return to [facility] LT     Anticipated Discharge Services:  [facility]  Anticipated Discharge DME:       Referrals Placed by CM/SW:    Private pay costs discussed: Not applicable    Additional Information:  Pt admitted from [facility]. Pt receiving hospice care through [facility]. Spoke with Jeannette in admissions at [facility] who stated they are able to take pt back if/when appropriate.     Social work will continue to follow and assist with discharge planning as needed.    FERMIN Thomas, LSW  Care Coordination  [phone]    FERMIN Burks

## 2023-06-20 NOTE — ED NOTES
First unit of blood transfused via [place] transfusion with vitals, temperature = 96.3 axillary, BP = 87/67, HR = 110, O2 = 100%, RR = 18.

## 2023-06-20 NOTE — PLAN OF CARE
PRIMARY DIAGNOSIS: Hemorrhagic Shock  OUTPATIENT/OBSERVATION GOALS TO BE MET BEFORE DISCHARGE:  1. ADLs back to baseline: No    2. Activity and level of assistance: Total Care    3. Pain status: Pain free.    4. Return to near baseline physical activity: No     Discharge Planner Nurse   Safe discharge environment identified: No  Barriers to discharge: Yes       Entered by: Radha Perez RN 06/20/2023 9:24 AM     Please review provider order for any additional goals.   Nurse to notify provider when observation goals have been met and patient is ready for discharge.      Pt on comfort care, family at bedside. Family is still requesting that she remains in her traditional clothing. Patient still unresponsive this morning. Family present at bedside, comfort cart from kitchen was ordered, family supplied with tooth brushes this morning.

## 2023-06-20 NOTE — ED PROVIDER NOTES
History     Chief Complaint:  Rectal Bleeding      HPI   See Spring is a 83 year old female with a history of hypertension, hyperlipidemia, CHF, and dementia who presents via EMS for evaluation of rectal bleeding. Today shortly prior to arrival staff at the patient's living facility checked on her and found that she had significant new bright red rectal bleeding with clotted blood present. Due to concern for this EMS was called to bring her into the ED for evaluation. Per EMS the patient had blood pressures in the 80s with oxygen saturations of 99%. Here in the ED the patient cannot provide additional history.     History limited due to language barrier vs dementia vs critical illness      Independent Historian:   EMS - They report the history as above.       Medications:    Tylenol  Dulcolax  Cymbalta  Hyoscyamine  Ativan  Morphine  Senna-docusate     Past Medical History:    Dementia  Depression  CHF  Osteoporosis  Hyperlipidemia  Hypertension   Failure to thrive   GERD   Plantar fascial fibromatosis     Past Surgical History:    IR biliary tube change  IR transcatheter biopsy    Wrist surgery  Dental extraction  Cholecystectomy  ERCP   Cataract removal     Physical Exam     Patient Vitals for the past 24 hrs:   BP Temp Temp src Pulse Resp SpO2   06/19/23 2145 -- -- -- -- -- (!) 76 %   06/19/23 2140 (!) 84/70 -- -- 113 -- 95 %   06/19/23 2135 (!) 76/63 -- -- 116 -- --   06/19/23 2130 (!) 80/68 -- -- 120 -- --   06/19/23 2110 (!) 68/50 -- -- 103 24 95 %   06/19/23 2105 (!) 79/61 -- -- 110 24 98 %   06/19/23 2100 (!) 74/64 -- -- 103 24 97 %   06/19/23 2055 90/73 -- -- 104 24 98 %   06/19/23 2050 (!) 71/57 -- -- 101 25 99 %   06/19/23 2045 (!) 81/65 -- -- 101 18 99 %   06/19/23 2040 (!) 87/73 -- -- 101 21 97 %   06/19/23 2037 -- -- -- 105 21 97 %   06/19/23 2036 -- -- -- 105 17 97 %   06/19/23 2035 103/81 -- -- 100 20 96 %   06/19/23 2030 (!) 109/90 -- -- 101 17 98 %   06/19/23 2029 -- -- -- 98 19 97 %   06/19/23  2028 -- -- -- 96 20 97 %   06/19/23 2027 -- -- -- 99 21 97 %   06/19/23 2026 -- -- -- 95 30 98 %   06/19/23 2025 (!) 118/92 -- -- 103 21 99 %   06/19/23 2024 -- -- -- 103 24 99 %   06/19/23 2022 -- -- -- 111 22 100 %   06/19/23 2021 -- -- -- 112 23 100 %   06/19/23 2020 104/81 -- -- 115 18 100 %   06/19/23 2011 (!) 87/67 -- -- -- -- --   06/19/23 2010 (!) 80/66 -- -- (!) 122 22 98 %   06/19/23 2005 (!) 87/67 -- -- 114 11 95 %   06/19/23 2000 (!) 121/97 -- -- 112 20 96 %   06/19/23 1935 105/77 -- -- 112 14 95 %   06/19/23 1932 -- 97.6  F (36.4  C) -- -- -- --   06/19/23 1930 (!) 80/67 -- -- 111 23 95 %   06/19/23 1929 112/83 97.4  F (36.3  C) Axillary 114 -- 96 %        Physical Exam  VS: Reviewed per above  HENT: no external signs head trauma  EYES: sclera anicteric  CV: Rate as noted, regular rhythm.   RESP: Effort normal.  GI: no tenderness/rebound/guarding, not distended.  Recta brisk BRBPR.   NEURO: Alert, withdraws to pain x4 ext, less in LUE. ?L facial weakness.   MSK: No deformity of the extremities  SKIN: Warm and dry    Emergency Department Course     Laboratory:  Labs Ordered and Resulted from Time of ED Arrival to Time of ED Departure   ISTAT BASIC CHEM ICA HEMATOCRIT POCT - Abnormal       Result Value    Chloride POCT 104      Potassium POCT 4.1      Sodium POCT 148 (*)     UREA NITROGEN POCT 29      Calcium, Ionized Whole Blood POCT 4.7      Glucose Whole Blood POCT 136 (*)     Anion Gap POCT 20.0 (*)     Hemoglobin POCT 12.6      Hematocrit POCT 37      Creatinine POCT 0.9      TOTAL CO2 POCT 30     COMPREHENSIVE METABOLIC PANEL - Abnormal    Sodium 147 (*)     Potassium 4.2      Chloride 108 (*)     Carbon Dioxide (CO2) 29      Anion Gap 10      Urea Nitrogen 28.1 (*)     Creatinine 0.88      Calcium 8.5 (*)     Glucose 141 (*)     Alkaline Phosphatase 85      AST 27      ALT 24      Protein Total 6.6      Albumin 3.3 (*)     Bilirubin Total 0.4      GFR Estimate 65     CBC WITH PLATELETS AND  DIFFERENTIAL - Abnormal    WBC Count 8.0      RBC Count 3.77 (*)     Hemoglobin 11.9      Hematocrit 38.7       (*)     MCH 31.6      MCHC 30.7 (*)     RDW 13.1      Platelet Count 242      % Neutrophils 58      % Lymphocytes 28      % Monocytes 10      % Eosinophils 3      % Basophils 1      % Immature Granulocytes 0      NRBCs per 100 WBC 0      Absolute Neutrophils 4.7      Absolute Lymphocytes 2.2      Absolute Monocytes 0.8      Absolute Eosinophils 0.3      Absolute Basophils 0.0      Absolute Immature Granulocytes 0.0      Absolute NRBCs 0.0     ISTAT GASES LACTATE VENOUS POCT - Abnormal    Lactic Acid POCT 1.5      Bicarbonate Venous POCT 32 (*)     O2 Sat, Venous POCT 17 (*)     pCO2 Venous POCT 65 (*)     pH Venous POCT 7.30 (*)     pO2 Venous POCT 16 (*)    ISTAT BASIC CHEM ICA HEMATOCRIT POCT - Abnormal    Chloride POCT 106      Potassium POCT 3.4      Sodium POCT 147 (*)     UREA NITROGEN POCT 30      Calcium, Ionized Whole Blood POCT 4.3 (*)     Glucose Whole Blood POCT 119 (*)     Anion Gap POCT 17.0 (*)     Hemoglobin POCT 10.2 (*)     Hematocrit POCT 30 (*)     Creatinine POCT 0.9      TOTAL CO2 POCT 29     INR - Normal    INR 1.07     TYPE AND SCREEN, ADULT    ABO/RH(D) O POS      Antibody Screen Negative      SPECIMEN EXPIRATION DATE [id number]     PREPARE RED BLOOD CELLS (UNIT)    ISSUE DATE AND TIME 20230619195100      Blood Component Type Red Blood Cells      Product Code O7249Y21      Unit Status Issued      Unit Number [id number]      UNIT ABO/RH O-      CODING SYSTEM [id number]      UNIT TYPE ISBT 9500     PREPARE RED BLOOD CELLS (UNIT)    ISSUE DATE AND TIME 20230619195100      Blood Component Type Red Blood Cells      Product Code V9903N53      Unit Status Issued      Unit Number [id number]      UNIT ABO/RH O-      CODING SYSTEM [id number]      UNIT TYPE ISBT 9500     ABO/RH TYPE AND SCREEN        Emergency Department Course & Assessments:     Interventions:  Medications    ondansetron (ZOFRAN ODT) ODT tab 4 mg (has no administration in time range)     Or   ondansetron (ZOFRAN) injection 4 mg (has no administration in time range)   acetaminophen (TYLENOL) tablet 650 mg (has no administration in time range)     Or   acetaminophen (TYLENOL) Suppository 650 mg (has no administration in time range)   HYDROmorphone (DILAUDID) half-tab 1 mg (has no administration in time range)   HYDROmorphone (DILAUDID) tablet 2 mg (has no administration in time range)   HYDROmorphone (DILAUDID) injection 0.2 mg (has no administration in time range)   HYDROmorphone (DILAUDID) injection 0.4 mg (has no administration in time range)   prochlorperazine (COMPAZINE) injection 5 mg (has no administration in time range)     Or   prochlorperazine (COMPAZINE) tablet 5 mg (has no administration in time range)     Or   prochlorperazine (COMPAZINE) suppository 12.5 mg (has no administration in time range)   LORazepam (ATIVAN) injection 0.5 mg (has no administration in time range)   haloperidol lactate (HALDOL) injection 1 mg (has no administration in time range)   Tranexamic Acid (CYCKLOKAPRON) 10 mg/mL infusion (1,000 mg  $Given 6/19/23 1957)        Assessments:  1925: The patient was seen and evaluated.     1932: I spoke to the patient's daughter over the phone.     2010: I spoke to David Jama, the patient's daughter-in-law, speaking on behalf of herself and the patient's son who clarified that they only want the patient to receive blood transfusion and not more invasive care.        Consultations/Discussion of Management or Tests:  1956: I spoke with Dr. Pereira of the colorectal surgery service regarding patient's presentation, findings, and plan of care.      2005: I spoke with Dr. Nunez of the interventional radiology service regarding patient's presentation, findings, and plan of care.   ED Course as of 06/19/23 2159 Mon Jun 19, 202319, 2023 2104 I updated family at bedside regarding patient status and my previus  discussions with jessika and rubens regarding comfort focused cares as oppose to restorative cares. Family in agreement with comfort cares.       Disposition:  The patient was admitted to the hospital under the care of Dr. gillespie .     Impression & Plan      Medical Decision Making:  Patient presents to the ER for evaluation of hypotension, bright red blood noted in depends.  On arrival patient has pressure in the low 100 systolic range.  She is tachycardic.  History is limited due to dementia versus critical illness versus language barrier.  On exam she has brisk bright red blood coming from the rectum.  Initially 2 tampons covered in Surgicel were placed in the rectum to help with possible hemorrhoidal bleed.  These were immediately expelled.  Patient was given IV TXA as well as 2 units of O-, uncrossed PRBC units.  Paperwork documented that patient has POLST suggesting DNR/DNI.  Ultimately POLST was faxed to us showing that patient would prefer comfort cares.  Ultimately we were able to get a hold of family to confirm patient's wishes for comfort focused cares rather than restorative cares.  Other family came to bedside to be with patient.  Further testing/interventions were withheld. Patient was admitted to hospitalist team for further comfort measures.    Critical Care time:  was 35 minutes for this patient excluding procedures.    Diagnosis:    ICD-10-CM    1. Gastrointestinal hemorrhage, unspecified gastrointestinal hemorrhage type  K92.2              Scribe Disclosure:  I, Lázaro Keith, am serving as a scribe at 7:25 PM on 6/19/2023 to document services personally performed by Aristeo Roberson MD based on my observations and the provider's statements to me.   6/19/2023   Aristeo Roberson MD Lindenbaum, Elan, MD  06/19/23 7789

## 2023-06-20 NOTE — PROGRESS NOTES
[facility]    Medicine Progress Note - Hospitalist Service    Date of Admission:  2023    Assessment & Plan    See Spring is a 83 year old female admitted on 2023. She has a past medical history significant for CHF, pulmonary hypertension, coronary artery disease, hypertension, hyperlipidemia, and failure to thrive.  She was brought to emergency room due to bright red blood per rectum.  Initially started on blood transfusions to treat acute GI bleed with hemorrhagic shock.  Shortly after transfusions were started, family arrived and confirmed the patient is DNR/DNI and on hospice care.      No further aggressive treatment wanted by family.  Patient is unresponsive at this time and expected to pass away fairly soon. Patient is managed by [facility] at care facility. If still alive tomorrow will need to discuss where hospice care should be continued.    #Hospice patient  #Bright red blood per rectum causing acute blood loss anemia and hemorrhagic shock  -Suspect massive lower GI bleed  -Vital signs showed hypotension and tachycardia consistent with this  -Patient is nonresponsive and will not open her eyes  -She is currently managed by [facility] but reportedly a nurse was concerned about bleeding and sent her to the hospital prior to contacting them  -Family would like to stay here in the hospital if possible given expiration is probably soon  -Consulted inpatient hospice who does not think patient is appropriate for their service  -If patient does not  by tomorrow could consider sending her back to facility for [facility] to manage  -Multiple family members at bedside  -Comfort care order set in place  -Appears very comfortable and bleeding seems to have subsided               Diet: Regular Diet Adult    DVT Prophylaxis: None ordered, comfort care  Davis Catheter: Not present  Lines: None     Cardiac Monitoring: None  Code Status: No CPR- Do NOT Intubate   "    Clinically Significant Risk Factors Present on Admission         # Hypernatremia: Highest Na = 148 mmol/L in last 2 days, will monitor as appropriate  # Hypocalcemia: Lowest iCa = 4.3 mg/dL in last 2 days, will monitor and replace as appropriate     # Hypoalbuminemia: Lowest albumin = 3.3 g/dL at 6/19/2023  7:48 PM, will monitor as appropriate       # Dementia: noted on problem list    # Cachexia: Estimated body mass index is 14.43 kg/m  as calculated from the following:    Height as of this encounter: 1.499 m (4' 11\").    Weight as of this encounter: 32.4 kg (71 lb 6.9 oz).            Disposition Plan      Expected Discharge Date: 06/20/2023    Discharge Delays: Comfort Care/Hospice            The patient's care was discussed with the Bedside Nurse, Patient, Patient's Family and IP hospice Consultant(s).    Pau Garsia PA-C  Hospitalist Service  [facility]  Securely message with [employer] (more info)  Text page via [facility] Paging/Directory   ______________________________________________________________________    Interval History   Patient is resting and will not respond to questions or open eyes for me. Appears comfortable    Physical Exam   Vital Signs: Temp: 96.8  F (36  C) Temp src: Axillary BP: (!) 119/90 Pulse: (!) 125   Resp: 20 SpO2: 95 % O2 Device: None (Room air)    Weight: 71 lbs 6.86 oz    Sleeping, breathing spontaneously, appears comfortable    Medical Decision Making       45 MINUTES SPENT BY ME on the date of service doing chart review, history, exam, documentation & further activities per the note.      Data     I have personally reviewed the following data over the past 24 hrs:    8.0  \   10.2 (L)   / 242     147 (H) 106 30 /  119 (H)   3.4 29 0.9 \       ALT: 24 AST: 27 AP: 85 TBILI: 0.4   ALB: 3.3 (L) TOT PROTEIN: 6.6 LIPASE: N/A       Procal: N/A CRP: N/A Lactic Acid: 1.5       INR:  1.07 PTT:  N/A   D-dimer:  N/A Fibrinogen:  N/A       Imaging results " reviewed over the past 24 hrs:   No results found for this or any previous visit (from the past 24 hour(s)).

## 2023-06-20 NOTE — PLAN OF CARE
Pt admitted from ED, currently on comfort care. Family at bedside. Pt unresponsive appears comfortable and pain free. Will continue to monitor.

## 2023-06-20 NOTE — TELEPHONE ENCOUNTER
"Nurse Latrice called to report rectal and vaginal bleeding.  She reports the bleeding to be \"a lot\" and she called Hospice (gave no instruction), and family (wanted patient to be sent to the ER).      Patient was sent to the ER    Electronically signed: Geri Slater CNP  "

## 2023-06-20 NOTE — CONSULTS
Referral Received - [facility]       [facility] would like to thank you for the [facility] referral.    Referral received and initial insurance information sent to  Hospice intake for review.    We are determining your patient's eligibility with a medical director at this time.    Our plan is to visit your patient as soon as possible. We will connect with the primary care team shortly to collect more information on the patient's progression. Thank you for your patience.      Update 10:30 am    Writer met with family to discuss hospice services.  Patient is currently on service with [facility]. [facility] is aware of this. [facility] will follow up with family and MD. Updated PARISH Owens and Radha RN.        Sasha Nolan RN  [facility]  Contact information available via [facility] Paging/Directory     Listed as [facility] Care in [facility]

## 2023-06-20 NOTE — ED TRIAGE NOTES
Pt arrived via EMS from memory care unit at [facility]. EMS reports vaginal bleeding with multiple blood clots. EMS reports visualization of bright red blood, with multiple blood clots in transit. Pt has history of Alzheimer's and dementia. EMS reports a systolic BP of 82 no diastolic reports. EMS reports BS = 148 and pt is not on blood thinners. EMS put int 20G in lower left arm. EMS reports pt is bed bound and not English speaking.

## 2023-06-20 NOTE — PHARMACY-ADMISSION MEDICATION HISTORY
Pharmacist Admission Medication History    Admission medication history is complete. The information provided in this note is only as accurate as the sources available at the time of the update.    Medication reconciliation/reorder completed by provider prior to medication history? No    Information Source(s): Facility ([facility]/) medication list/MAR via N/A    Pertinent Information:      Changes made to PTA medication list:    Added: None    Deleted: None    Changed: lorazepam, morphine, tylenol supp, dulcolax supp, senokot-s    Medication Affordability:  Not including over the counter (OTC) medications, was there a time in the past 3 months when you did not take your medications as prescribed because of cost?: Unable to Assess    Allergies reviewed with patient and updates made in EHR:No    Prior to Admission medications    Medication Sig Last Dose Taking? Auth Provider Long Term End Date   acetaminophen (TYLENOL) 650 MG suppository Place 650 mg rectally every 6 hours as needed for fever  Yes Unknown, Entered By History     bisacodyl (DULCOLAX) 10 MG suppository Place 10 mg rectally daily as needed for constipation  Yes Reported, Patient     DULoxetine (CYMBALTA) 20 MG capsule Take 20 mg by mouth daily 6/19/2023 at am Yes Reported, Patient Yes    hyoscyamine 0.125 MG TBDP Take 0.125 mg by mouth every 4 hours as needed for cramping  Yes Reported, Patient     LORazepam (ATIVAN) 0.5 MG tablet Take 0.5 mg by mouth every 4 hours as needed for anxiety  Yes Unknown, Entered By History     LORazepam (ATIVAN) 0.5 MG tablet Take 0.5 mg by mouth 4 times daily 1600 Yes Reported, Patient     morphine 2.5 MG solu-tab Take 2.5 mg by mouth every hour as needed for shortness of breath / dyspnea or moderate to severe pain  Yes Reported, Patient     morphine 5 MG solu-tab Take 10 mg by mouth 3 times daily 6/19/2023 at 1600 Yes Unknown, Entered By History     senna-docusate (SENOKOT-S/PERICOLACE) 8.6-50 MG tablet Take 1 tablet by  mouth 2 times daily 6/19/2023 at am Yes Unknown, Entered By History     senna-docusate (SENOKOT-S/PERICOLACE) 8.6-50 MG tablet Take 1 tablet by mouth daily as needed And daily PRN  Yes Reported, Patient

## 2023-06-20 NOTE — ED NOTES
Pt arrives to ED, 18G on right AC placed, MD attempted to contact family multiple times to determine plan of cares and interventions.

## 2023-06-20 NOTE — PLAN OF CARE
PRIMARY DIAGNOSIS: Hemorrhagic Shock  OUTPATIENT/OBSERVATION GOALS TO BE MET BEFORE DISCHARGE:  1. ADLs back to baseline: No    2. Activity and level of assistance: Total Care    3. Pain status: Pain free.    4. Return to near baseline physical activity: No     Discharge Planner Nurse   Safe discharge environment identified: No  Barriers to discharge: Yes       Entered by: Radha Perez RN 06/20/2023 11:25 AM     Please review provider order for any additional goals.   Nurse to notify provider when observation goals have been met and patient is ready for discharge.      Pt on comfort care, family at bedside. Family is still requesting that she remains in her traditional clothing. Patient still unresponsive this morning. Family present at bedside. A second recliner was brought into the room since two or more family members will be spending the night.

## 2023-06-20 NOTE — ED NOTES
ED Da SAINZ ordered 2 units of blood, with assistance of ED tech and RN, visualized bleeding is rectally and inserted two tampons rectally. Pt was put into soft restraints as pt contines to reach into blood with hands.

## 2023-06-20 NOTE — H&P
[facility]    History and Physical - Hospitalist Service       Date of Admission:  6/19/2023    Assessment & Plan      See Spring is a 83 year old female admitted on 6/19/2023. She has a past medical history significant for CHF, pulmonary hypertension, coronary artery disease, hypertension, hyperlipidemia, and failure to thrive.  She was brought to emergency room due to bright red blood per rectum.  Initially started on blood transfusions to treat acute GI bleed with hemorrhagic shock.  Shortly after transfusions were started, family arrived and confirmed the patient is DNR/DNI and on hospice care.  No further aggressive treatment wanted by family.  Patient is unresponsive at this time.  Being placed in the hospital on comfort care and expected to pass away fairly soon.  I was asked to admit the patient to the hospital to help with comfort medications in the acute dying process.    Hemorrhagic shock.  Acute blood loss anemia.  Hematochezia.  -Suspect massive lower GI bleed.  -Initial hemoglobin 12.6.  -Initially received transfusion of packed red blood cells before it was known that she was on hospice care.  -Repeat hemoglobin 10.2.  -Severely hypotensive.  -Family now at bedside.  -Does confirm that patient is DNR/DNI and on hospice care.  -Family does not want any further aggressive measures.  -Wants comfort medications only.  -No further lab monitoring.  -Comfort medications only.    Acute hypoxic and hypercapnic respiratory failure.  Acute metabolic encephalopathy.  Elevated anion gap metabolic acidosis.  -Encephalopathy and respiratory failure likely due to hemorrhagic shock.  -Comfort measures only.  -No further lab monitoring.  -No further aggressive treatment.    Hypernatremia.  -Sodium initially 147.  -No further lab monitoring.  -No further aggressive treatment.     Diet:  Regular diet if she wakes up enough to eat for comfort only.  DVT Prophylaxis: Comfort care only.  Ryan  Catheter: Not present  Lines: None     Cardiac Monitoring: None  Code Status:  DNR/DNI.    Clinically Significant Risk Factors Present on Admission         # Hypernatremia: Highest Na = 148 mmol/L in last 2 days, will monitor as appropriate  # Hypocalcemia: Lowest iCa = 4.3 mg/dL in last 2 days, will monitor and replace as appropriate     # Hypoalbuminemia: Lowest albumin = 3.3 g/dL at 6/19/2023  7:48 PM, will monitor as appropriate       # Dementia: noted on problem list             Disposition Plan      Expected Discharge Date: 06/20/2023                  Jalil Prince DO  Hospitalist Service  [facility]  Securely message with [employer] (more info)  Text page via [facility] Paging/Directory     ______________________________________________________________________    Chief Complaint   Bright red blood per rectum.    History is obtained from the patient and emergency department physician    History of Present Illness   See Spring is a 83 year old female who has a past medical history significant for CHF, pulmonary hypertension, coronary artery disease, hypertension, hyperlipidemia, and failure to thrive.  She was sent to the emergency room due to development of bright red blood per rectum.  At the time that she arrived in the ER, it was not known that she was on hospice care.  She initially received transfusion of packed red blood cells.  Family members then arrived in the ER and confirmed that patient was on hospice care only.  Patient is currently obtunded and not answering any questions.  Information obtained from emergency room provider and from family members at bedside.  Patient did not have any recent known medical problems.  Family does not know of previous GI bleeds.  No other acute complaints.      Past Medical History    No past medical history on file.    Past Surgical History   Past Surgical History:   Procedure Laterality Date     IR BILIARY TUBE CHANGE  5/26/2011     IR  TRANSCATHETER BIOPSY  5/26/2011       Prior to Admission Medications   Prior to Admission Medications   Prescriptions Last Dose Informant Patient Reported? Taking?   DULoxetine (CYMBALTA) 20 MG capsule   Yes No   Sig: Take 20 mg by mouth daily   LORazepam (ATIVAN) 0.5 MG tablet   Yes No   Sig: Take 0.5 mg by mouth 4 times daily And Q4H PRN for anxiety   acetaminophen (TYLENOL) 650 MG suppository   No No   Sig: Place 1 suppository (650 mg) rectally every 4 hours as needed for fever   bisacodyl (DULCOLAX) 10 MG suppository   Yes No   Sig: Place 10 mg rectally daily as needed for constipation   hyoscyamine 0.125 MG TBDP   Yes No   Sig: Take 0.125 mg by mouth every 4 hours as needed for cramping   morphine 2.5 MG solu-tab   Yes No   Sig: Take 2.5 mg by mouth every hour as needed for shortness of breath / dyspnea or moderate to severe pain   morphine 2.5 MG solu-tab   Yes No   Sig: Take 5 mg by mouth 2 times daily   senna-docusate (SENOKOT-S/PERICOLACE) 8.6-50 MG tablet   Yes No   Sig: Take 1 tablet by mouth 2 times daily And daily PRN      Facility-Administered Medications: None        Allergies   Allergies   Allergen Reactions     Gabapentin Unknown        Physical Exam   Vital Signs: Temp: 97.6  F (36.4  C) Temp src: Axillary BP: (!) 68/50 Pulse: 103   Resp: 24 SpO2: 95 % O2 Device: None (Room air)    Weight: 0 lbs 0 oz    Gen: Cachectic, unresponsive.  OP:  MMM, no lesions.  CV: Mildly irregular, tachycardic, no loud murmurs.  Lung:  CTA b/l, normal effort.  Ab:  +BS, soft.  Skin:  Cool, dry to touch.  No rash.  Ext:  No pitting edema LE b/l.      Medical Decision Making       60 MINUTES SPENT BY ME on the date of service doing chart review, history, exam, documentation & further activities per the note.      Data     I have personally reviewed the following data over the past 24 hrs:    8.0  \   10.2 (L)   / 242     147 (H) 106 30 /  119 (H)   3.4 29 0.9 \       ALT: 24 AST: 27 AP: 85 TBILI: 0.4   ALB: 3.3 (L) TOT  PROTEIN: 6.6 LIPASE: N/A       Procal: N/A CRP: N/A Lactic Acid: 1.5       INR:  1.07 PTT:  N/A   D-dimer:  N/A Fibrinogen:  N/A       Imaging results reviewed over the past 24 hrs:   No results found for this or any previous visit (from the past 24 hour(s)).

## 2023-06-20 NOTE — PLAN OF CARE
PRIMARY DIAGNOSIS: Hemorrhagic Shock  OUTPATIENT/OBSERVATION GOALS TO BE MET BEFORE DISCHARGE:  1. ADLs back to baseline: No    2. Activity and level of assistance: Total Care    3. Pain status: Pain free.    4. Return to near baseline physical activity: No     Discharge Planner Nurse   Safe discharge environment identified: No  Barriers to discharge: Yes       Entered by: Carole Ramos RN 06/20/2023 6:13 AM     Please review provider order for any additional goals.   Nurse to notify provider when observation goals have been met and patient is ready for discharge.    Pt on comfort care, family at bedside. Family requested for pt to be on her traditional clothing. Comfortable during the shift no signs of distress or discomfort noted.

## 2023-06-20 NOTE — UTILIZATION REVIEW
"  Admission Status; Secondary Review Determination         Under the authority of the Utilization Management Committee, the utilization review process indicated a secondary review on the above patient.  The review outcome is based on review of the medical records, discussions with staff, and applying clinical experience noted on the date of the review.       (x) Observation Status Appropriate - This patient does not meet hospital inpatient criteria and is placed in observation status. If this patient's primary payer is Medicare and was admitted as an inpatient, Condition Code 44 should be used and patient status changed to \"observation\".       RATIONALE FOR DETERMINATION   The patient is an 83-year-old female admitted on 2023.  Patient has significant underlying conditions and had been at [facility].  Patient did have bright red blood per rectum and a nurse at her care facility sent her to the emergency room prior to notifying [facility].  At this time inpatient hospice does not feel it would be appropriate to manage care in the hospital.  Likely the patient will  shortly based on the amount of GI bleed.  Recommendation is to maintain current observation status based on diagnosis and current treatment.      The severity of illness, intensity of service provided, expected LOS and risk for adverse outcome make the care complex, high risk and appropriate for hospital admission.        The information on this document is developed by the utilization review team in order for the business office to ensure compliance.  This only denotes the appropriateness of proper admission status and does not reflect the quality of care rendered.         The definitions of Inpatient Status and Observation Status used in making the determination above are those provided in the CMS Coverage Manual, Chapter 1 and Chapter 6, section 70.4.      Sincerely,     Corey Villanueva MD  Physician Advisor  Utilization " Review/ Case Management  [facility].

## 2023-06-20 NOTE — PLAN OF CARE
PRIMARY DIAGNOSIS: Hemorrhagic Shock  OUTPATIENT/OBSERVATION GOALS TO BE MET BEFORE DISCHARGE:  1. ADLs back to baseline: No    2. Activity and level of assistance: Total Care    3. Pain status: Pain free.    4. Return to near baseline physical activity: No     Discharge Planner Nurse   Safe discharge environment identified: No  Barriers to discharge: Yes       Entered by: Radha Perez RN 06/20/2023 4:29 PM     Please review provider order for any additional goals.   Nurse to notify provider when observation goals have been met and patient is ready for discharge.      Pt on comfort care, family at bedside. Family is still requesting that she remains in her traditional clothing. Patient still unresponsive this morning. Family present at bedside. A second recliner was brought into the room since two or more family members will be spending the night. [facility] will take him back if able tomorrow

## 2023-06-21 NOTE — PROGRESS NOTES
[facility]    Medicine Progress Note - Hospitalist Service    Date of Admission:  6/19/2023    Assessment & Plan   See Spring is a 83 year old female admitted on 6/19/2023. She has a past medical history significant for CHF, pulmonary hypertension, coronary artery disease, hypertension, hyperlipidemia, and failure to thrive.  She was brought to emergency room due to bright red blood per rectum.  Initially started on blood transfusions to treat acute GI bleed with hemorrhagic shock.  Shortly after transfusions were started, family arrived and confirmed the patient is DNR/DNI and on hospice care.       No further aggressive treatment wanted by family. Patient is managed by [facility] at care facility.   Initially patient was unresponsive on 6/20 with expected passing in the near future.  Over the last day the patient has been stable, responsive for family and able to open eyes during interview on 6/21. Social work assisting with possible new hospice location through [facility] if she continues to remain stable. If patient were to have an evident decline and appear to be actively dying, discharge would be held and patient would remain in the hospital.    #Hospice patient  #Bright red blood per rectum causing acute blood loss anemia and hemorrhagic shock  -Suspect massive lower GI bleed  -Vital signs showed hypotension and tachycardia consistent with this  -initially unresponsive and not interactive on 6/20  -Stable, intermittently alert on 6/21, does not appear to be actively dying at this time   -She is currently managed by [facility] but reportedly a nurse was concerned about bleeding and sent her to the hospital prior to contacting them  -Family would like to look into alternative hospice location upon discharge if patient remains stable   -Consulted inpatient hospice on 6/20 and does not think patient is appropriate for their service  -Multiple family members at  "bedside  -Comfort care order set in place  -Appears very comfortable and bleeding seems to have subsided     Diet: Regular Diet Adult    DVT Prophylaxis: None ordered, comfort care   Davis Catheter: Not present  Lines: None     Cardiac Monitoring: None  Code Status: No CPR- Do NOT Intubate      Clinically Significant Risk Factors Present on Admission         # Hypernatremia: Highest Na = 148 mmol/L in last 2 days, will monitor as appropriate  # Hypocalcemia: Lowest iCa = 4.3 mg/dL in last 2 days, will monitor and replace as appropriate     # Hypoalbuminemia: Lowest albumin = 3.3 g/dL at 6/19/2023  7:48 PM, will monitor as appropriate       # Dementia: noted on problem list    # Cachexia: Estimated body mass index is 14.43 kg/m  as calculated from the following:    Height as of this encounter: 1.499 m (4' 11\").    Weight as of this encounter: 32.4 kg (71 lb 6.9 oz).          Disposition Plan      Expected Discharge Date: 06/22/2023    Discharge Delays: Comfort Care/Hospice            The patient's care was discussed with the Attending Physician, Dr. Garibay, Bedside Nurse, Care Coordinator/ and Patient's Family.    Yi Ruvalcaba PA-C  Hospitalist Service  [facility]  Securely message with [employer] (more info)  Text page via [facility] Paging/Directory   ______________________________________________________________________    Interval History   Patient without evidence of bleeding overnight. Labored breathing at times otherwise intermittently responsive and not actively dying. Appears comfortable.     Physical Exam   Vital Signs:                  Weight: 71 lbs 6.86 oz    Resting comfortably, sleeping mostly but did open eyes when listening to heart sounds, intermittent mildly labored breathing    Medical Decision Making       35 MINUTES SPENT BY ME on the date of service doing chart review, history, exam, documentation & further activities per the note.      Data "   ------------------------- PAST 24 HR DATA REVIEWED -----------------------------------------------

## 2023-06-21 NOTE — CONSULTS
Care Management Follow Up    Expected Discharge Date: 06/22/2023     Concerns to be Addressed: Discharge planning      Patient plan of care discussed at interdisciplinary rounds: Yes    Anticipated Discharge Disposition:  Placement with hospice     Anticipated Discharge Services:  Hospice    Patient/Family in Agreement with the Plan:  Yes    Referrals Placed by CM/SW:  [facility] residential hospice  Private pay costs discussed: Not applicable    Additional Information:  Per care team rounds, patient is stable at this time for discharge from hospital with resumption of hospice.     HEAVEN met with patient's 4 daughters at bedside to discuss discharge planning. Patient was admitted from [facility] (confirmed bed hold) and was enrolled with [facility]. Daughters at bedside asked SW to call pt's daughter David, [phone].     HEAVEN spoke with David via phone. David reports that they don't feel [facility] was the best fit for patient and would prefer alternative placement at discharge. They are interested in [facility].     HEAVEN completed [facility] application and faxed to (f) [phone]. HEAVEN placed call to [facility], [phone]. VM left requesting a return call to inquire about immediate bed availability.     HEAVEN discussed alternative discharge plan if [facility] does not have bed availability. Family would like a [place] focused residential hospice. HEAVEN explained that alternative residential hospice facilities would be private pay and discussed associated cost. Daughter would like to wait to hear back from [facility] before discussing alternative options.     HEAVEN will continue to follow.     WILLIAN Miller, [facility]   Inpatient Care Coordination  [facility]   [phone]

## 2023-06-21 NOTE — PROGRESS NOTES
"0421-0830    Inpatient Progress Note:    BP (!) 119/90 (BP Location: Right arm)   Pulse (!) 125   Temp 96.8  F (36  C) (Axillary)   Resp 20   Ht 1.499 m (4' 11\")   Wt 32.4 kg (71 lb 6.9 oz)   SpO2 95%   BMI 14.43 kg/m         Orientation:none vocal   Neuro: able to open the eyes w/stimulation   Pain status: unable to assess  Activity: bed rest  Peripheral edema: none  Resp: diminished   Cardiac: irregular  GI: bleed, no BS hypo active  :  small urine out put  Skin: redness coccyx   LDA: no IV  Infusions: none  Pertinent Labs: none  Diet: unable to swallow   Consults: hospice  Discharge Plan: comfort care    Will continue to provide cares.     Armaan Diaz RN         "

## 2023-06-21 NOTE — PLAN OF CARE
PRIMARY DIAGNOSIS: Hemorrhagic Shock  OUTPATIENT/OBSERVATION GOALS TO BE MET BEFORE DISCHARGE:  ADLs back to baseline: No     Activity and level of assistance: Total Care     Pain status: Pain free.     Return to near baseline physical activity: No          Discharge Planner Nurse   Safe discharge environment identified: No  Barriers to discharge: Yes       Entered by: Radha Perez RN 06/20/2023 4:29 PM  Please review provider order for any additional goals.   Nurse to notify provider when observation goals have been met and patient is ready for discharge.     Pt just switched from inpatient to observation status     Pt is still on comfort care, registered with [facility]. Still in her traditional clothing. This morning patient has opened her eyes and was able to take a few sips of water. Family is still present at bedside. Possible discharge to [facility] or [facility].

## 2023-06-22 ASSESSMENT — ACTIVITIES OF DAILY LIVING (ADL)
ADLS_ACUITY_SCORE: 50
ADLS_ACUITY_SCORE: 50

## 2023-06-22 NOTE — PROGRESS NOTES
Death Pronouncement    No spontaneous respirations, no palpable or auscultatory heart sounds.  Extremities are cooling.    Date and time of death:   6/21/2023 @ 23:37

## 2023-06-22 NOTE — PROVIDER NOTIFICATION
Patient was hospice . need time of death  no sign of life @11:37 pm     On call provider notified Dr. Rivera on her way to pronounce time of the death

## 2023-06-22 NOTE — PLAN OF CARE
PRIMARY DIAGNOSIS: HOSPICE PATIENT  OUTPATIENT/OBSERVATION GOALS TO BE MET BEFORE DISCHARGE:  ADLs back to baseline: Yes    Activity and level of assistance: Comfort care    Pain status: Improved-controlled with oral pain medications.    Return to near baseline physical activity:  Comfort care     Discharge Planner Nurse   Safe discharge environment identified: Yes  Barriers to discharge: No       Entered by: Armaan Diaz RN 06/21/2023 10:46 PM     Please review provider order for any additional goals.   Nurse to notify provider when observation goals have been met and patient is ready for discharge.

## 2023-06-23 NOTE — DISCHARGE SUMMARY
[facility]    Discharge Summary  Hospitalist    Date of Admission:  6/19/2023  Date of Discharge:  6/21/2023 11:37 PM  Provider:  Tamanna Ruvalcaba PA-C  Date of Service (when I last saw the patient): 6/21/23    Discharge Diagnoses    Hospice patient  BRBPR causing acute blood loss anemia and hemorrhagic shock     Other medical issues:  No past medical history on file.    History of Present Illness   See Spring is a 83 year old female admitted on 6/19/2023. She has a past medical history significant for CHF, pulmonary hypertension, coronary artery disease, hypertension, hyperlipidemia, and failure to thrive.  She was brought to emergency room due to bright red blood per rectum.  Initially started on blood transfusions to treat acute GI bleed with hemorrhagic shock.  Shortly after transfusions were started, family arrived and confirmed the patient is DNR/DNI and on hospice care. Please see the admission history and physical for full details.    Hospital Course   See Spring was admitted on 6/19/2023. No further aggressive treatment wanted by family which was confirmed the day after admission. Patient is managed by [facility] facility.  Initially patient was unresponsive on 6/20 with expected passing in the near future.  Over the last day the patient had been stable, responsive for family and able to open eyes during interview on 6/21. Social work assisting with possible new hospice location through [facility] if she continues to remain stable. Patient had rapid decline the evening of 6/21 and was pronounced dead at 23:37 by cross cover provider.     Pending Results   Unresulted Labs Ordered in the Past 30 Days of this Admission     Date and Time Order Name Status Description    6/19/2023  7:52 PM Prepare red blood cells (unit) Preliminary     6/19/2023  7:52 PM Prepare red blood cells (unit) Preliminary         Code Status   DNR / DNI       Primary Care Physician   Hui  Escobarr    Exam:    Previous exam noted in progress note on 6/21, cross cover provider Dr. Rivera pronounced patient at time of death, refer to her note.    Discharge Disposition   Discharged to [facility]     Consultations This Hospital Stay   [facility] ADULT CONSULT  CARE MANAGEMENT / SOCIAL WORK IP CONSULT    Time Spent on this Encounter   Saw patient earlier the day of death, but not at time of death, refer to progress note.    Allergies   Allergies   Allergen Reactions     Gabapentin Unknown     Data   Results for orders placed or performed during the hospital encounter of 06/19/23   iStat Basic Chem ICA Hematocrit, POCT     Status: Abnormal   Result Value Ref Range    Chloride POCT 104 94 - 109 mmol/L    Potassium POCT 4.1 3.4 - 5.3 mmol/L    Sodium POCT 148 (H) 133 - 144 mmol/L    UREA NITROGEN POCT 29 7 - 30 mg/dL    Calcium, Ionized Whole Blood POCT 4.7 4.4 - 5.2 mg/dL    Glucose Whole Blood POCT 136 (H) 70 - 99 mg/dL    Anion Gap POCT 20.0 (H) 3.0 - 14.0 mmol/L    Hemoglobin POCT 12.6 11.7 - 15.7 g/dL    Hematocrit POCT 37 35 - 47 %    Creatinine POCT 0.9 0.5 - 1.0 mg/dL    TOTAL CO2 POCT 30 20 - 32 mmol/L   Comprehensive metabolic panel     Status: Abnormal   Result Value Ref Range    Sodium 147 (H) 136 - 145 mmol/L    Potassium 4.2 3.4 - 5.3 mmol/L    Chloride 108 (H) 98 - 107 mmol/L    Carbon Dioxide (CO2) 29 22 - 29 mmol/L    Anion Gap 10 7 - 15 mmol/L    Urea Nitrogen 28.1 (H) 8.0 - 23.0 mg/dL    Creatinine 0.88 0.51 - 0.95 mg/dL    Calcium 8.5 (L) 8.8 - 10.2 mg/dL    Glucose 141 (H) 70 - 99 mg/dL    Alkaline Phosphatase 85 35 - 104 U/L    AST 27 0 - 45 U/L    ALT 24 0 - 50 U/L    Protein Total 6.6 6.4 - 8.3 g/dL    Albumin 3.3 (L) 3.5 - 5.2 g/dL    Bilirubin Total 0.4 <=1.2 mg/dL    GFR Estimate 65 >60 mL/min/1.73m2   INR     Status: Normal   Result Value Ref Range    INR 1.07 0.85 - 1.15   Extra Tube ([place] Draw) *Canceled*     Status: None ()    Narrative    The following orders were created  for panel order Extra Tube ([place] Draw).  Procedure                               Abnormality         Status                     ---------                               -----------         ------                       Please view results for these tests on the individual orders.   CBC with platelets and differential     Status: Abnormal   Result Value Ref Range    WBC Count 8.0 4.0 - 11.0 10e3/uL    RBC Count 3.77 (L) 3.80 - 5.20 10e6/uL    Hemoglobin 11.9 11.7 - 15.7 g/dL    Hematocrit 38.7 35.0 - 47.0 %     (H) 78 - 100 fL    MCH 31.6 26.5 - 33.0 pg    MCHC 30.7 (L) 31.5 - 36.5 g/dL    RDW 13.1 10.0 - 15.0 %    Platelet Count 242 150 - 450 10e3/uL    % Neutrophils 58 %    % Lymphocytes 28 %    % Monocytes 10 %    % Eosinophils 3 %    % Basophils 1 %    % Immature Granulocytes 0 %    NRBCs per 100 WBC 0 <1 /100    Absolute Neutrophils 4.7 1.6 - 8.3 10e3/uL    Absolute Lymphocytes 2.2 0.8 - 5.3 10e3/uL    Absolute Monocytes 0.8 0.0 - 1.3 10e3/uL    Absolute Eosinophils 0.3 0.0 - 0.7 10e3/uL    Absolute Basophils 0.0 0.0 - 0.2 10e3/uL    Absolute Immature Granulocytes 0.0 <=0.4 10e3/uL    Absolute NRBCs 0.0 10e3/uL   iStat Gases (lactate) venous, POCT     Status: Abnormal   Result Value Ref Range    Lactic Acid POCT 1.5 <=2.0 mmol/L    Bicarbonate Venous POCT 32 (H) 21 - 28 mmol/L    O2 Sat, Venous POCT 17 (L) 94 - 100 %    pCO2 Venous POCT 65 (H) 40 - 50 mm Hg    pH Venous POCT 7.30 (L) 7.32 - 7.43    pO2 Venous POCT 16 (L) 25 - 47 mm Hg   iStat Basic Chem ICA Hematocrit, POCT     Status: Abnormal   Result Value Ref Range    Chloride POCT 106 94 - 109 mmol/L    Potassium POCT 3.4 3.4 - 5.3 mmol/L    Sodium POCT 147 (H) 133 - 144 mmol/L    UREA NITROGEN POCT 30 7 - 30 mg/dL    Calcium, Ionized Whole Blood POCT 4.3 (L) 4.4 - 5.2 mg/dL    Glucose Whole Blood POCT 119 (H) 70 - 99 mg/dL    Anion Gap POCT 17.0 (H) 3.0 - 14.0 mmol/L    Hemoglobin POCT 10.2 (L) 11.7 - 15.7 g/dL    Hematocrit POCT 30 (L) 35 - 47 %     Creatinine POCT 0.9 0.5 - 1.0 mg/dL    TOTAL CO2 POCT 29 20 - 32 mmol/L   Adult Type and Screen     Status: None   Result Value Ref Range    ABO/RH(D) O POS     Antibody Screen Negative Negative    SPECIMEN EXPIRATION DATE [id number]    Prepare red blood cells (unit)     Status: None (Preliminary result)   Result Value Ref Range    ISSUE DATE AND TIME 20230619195100     Blood Component Type Red Blood Cells     Product Code Q6791C48     Unit Status Issued     Unit Number [id number]     UNIT ABO/RH O-     CODING SYSTEM [id number]     UNIT TYPE ISBT 9500    Prepare red blood cells (unit)     Status: None (Preliminary result)   Result Value Ref Range    ISSUE DATE AND TIME [id number]     Blood Component Type Red Blood Cells     Product Code E6639V83     Unit Status Issued     Unit Number [id number]     UNIT ABO/RH O-     CODING SYSTEM [id number]     UNIT TYPE ISBT 9500    Prepare red blood cells (unit)     Status: None   Result Value Ref Range    ISSUE DATE AND TIME 20230619195100     Blood Component Type Red Blood Cells     Product Code V1129I15     Unit Status Transfused     Unit Number [id number]     UNIT ABO/RH O-     CODING SYSTEM [id number]     UNIT TYPE ISBT 9500    Prepare red blood cells (unit)     Status: None   Result Value Ref Range    ISSUE DATE AND TIME [id number]     Blood Component Type Red Blood Cells     Product Code R0103K32     Unit Status Transfused     Unit Number [id number]     UNIT ABO/RH O-     CODING SYSTEM [id number]     UNIT TYPE ISBT 9500    CBC with platelets differential     Status: Abnormal    Narrative    The following orders were created for panel order CBC with platelets differential.  Procedure                               Abnormality         Status                     ---------                               -----------         ------                     CBC with platelets and d...[[id number]]  Abnormal            Final result                 Please view results for  these tests on the individual orders.   ABO/Rh type and screen     Status: None    Narrative    The following orders were created for panel order ABO/Rh type and screen.  Procedure                               Abnormality         Status                     ---------                               -----------         ------                     Adult Type and Screen[[id number]]                            Final result                 Please view results for these tests on the individual orders.     Yi Ruvalcaba PA-C